# Patient Record
Sex: MALE | Race: BLACK OR AFRICAN AMERICAN | Employment: FULL TIME | URBAN - METROPOLITAN AREA
[De-identification: names, ages, dates, MRNs, and addresses within clinical notes are randomized per-mention and may not be internally consistent; named-entity substitution may affect disease eponyms.]

---

## 2018-11-08 ENCOUNTER — HOSPITAL ENCOUNTER (OUTPATIENT)
Dept: CT IMAGING | Age: 58
Discharge: HOME | End: 2018-11-08
Attending: NURSE PRACTITIONER
Payer: COMMERCIAL

## 2018-11-08 DIAGNOSIS — R31.29 MICROSCOPIC HEMATURIA: ICD-10-CM

## 2018-11-08 LAB — CREAT BLD-MCNC: 1.1 MG/DL (ref 0.8–1.5)

## 2018-11-08 PROCEDURE — 82565 ASSAY OF CREATININE: CPT

## 2018-11-08 PROCEDURE — 74011000258 HC RX REV CODE- 258

## 2018-11-08 PROCEDURE — 74178 CT ABD&PLV WO CNTR FLWD CNTR: CPT

## 2018-11-08 PROCEDURE — 74011636320 HC RX REV CODE- 636/320

## 2018-11-08 RX ORDER — SODIUM CHLORIDE 0.9 % (FLUSH) 0.9 %
10 SYRINGE (ML) INJECTION
Status: COMPLETED | OUTPATIENT
Start: 2018-11-08 | End: 2018-11-08

## 2018-11-08 RX ADMIN — IOPAMIDOL 100 ML: 755 INJECTION, SOLUTION INTRAVENOUS at 08:36

## 2018-11-08 RX ADMIN — SODIUM CHLORIDE 100 ML: 900 INJECTION, SOLUTION INTRAVENOUS at 08:36

## 2018-11-08 RX ADMIN — Medication 10 ML: at 08:36

## 2018-11-12 NOTE — PROGRESS NOTES
Pt has a stone. I recommend he come here to discuss stone treatment options. Please schedule in the next week or so. Can use a held slot if needed.

## 2018-11-18 ENCOUNTER — ANESTHESIA EVENT (OUTPATIENT)
Dept: SURGERY | Age: 58
End: 2018-11-18
Payer: COMMERCIAL

## 2018-11-19 ENCOUNTER — ANESTHESIA (OUTPATIENT)
Dept: SURGERY | Age: 58
End: 2018-11-19
Payer: COMMERCIAL

## 2018-11-19 ENCOUNTER — HOSPITAL ENCOUNTER (OUTPATIENT)
Age: 58
Setting detail: OUTPATIENT SURGERY
Discharge: HOME | End: 2018-11-19
Attending: UROLOGY | Admitting: UROLOGY
Payer: COMMERCIAL

## 2018-11-19 VITALS
HEART RATE: 65 BPM | WEIGHT: 179.6 LBS | DIASTOLIC BLOOD PRESSURE: 95 MMHG | OXYGEN SATURATION: 99 % | BODY MASS INDEX: 26.52 KG/M2 | TEMPERATURE: 97.8 F | RESPIRATION RATE: 16 BRPM | SYSTOLIC BLOOD PRESSURE: 162 MMHG

## 2018-11-19 DIAGNOSIS — N20.0 KIDNEY STONE: Primary | ICD-10-CM

## 2018-11-19 LAB
ANION GAP SERPL CALC-SCNC: 5 MMOL/L (ref 7–16)
BUN SERPL-MCNC: 22 MG/DL (ref 8–23)
CALCIUM SERPL-MCNC: 9.1 MG/DL (ref 8.3–10.4)
CHLORIDE SERPL-SCNC: 107 MMOL/L (ref 98–107)
CO2 SERPL-SCNC: 28 MMOL/L (ref 21–32)
CREAT SERPL-MCNC: 1.2 MG/DL (ref 0.8–1.5)
ERYTHROCYTE [DISTWIDTH] IN BLOOD BY AUTOMATED COUNT: 14.9 %
GLUCOSE SERPL-MCNC: 91 MG/DL (ref 65–100)
HCT VFR BLD AUTO: 45.2 % (ref 41.1–50.3)
HGB BLD-MCNC: 13.9 G/DL (ref 13.6–17.2)
MCH RBC QN AUTO: 24.6 PG (ref 26.1–32.9)
MCHC RBC AUTO-ENTMCNC: 30.8 G/DL (ref 31.4–35)
MCV RBC AUTO: 80 FL (ref 79.6–97.8)
NRBC # BLD: 0 K/UL (ref 0–0.2)
PLATELET # BLD AUTO: 248 K/UL (ref 150–450)
PMV BLD AUTO: 11.9 FL (ref 9.4–12.3)
POTASSIUM SERPL-SCNC: 4.9 MMOL/L (ref 3.5–5.1)
RBC # BLD AUTO: 5.65 M/UL (ref 4.23–5.6)
SODIUM SERPL-SCNC: 140 MMOL/L (ref 136–145)
WBC # BLD AUTO: 5.4 K/UL (ref 4.3–11.1)

## 2018-11-19 PROCEDURE — 74011250636 HC RX REV CODE- 250/636: Performed by: ANESTHESIOLOGY

## 2018-11-19 PROCEDURE — 80048 BASIC METABOLIC PNL TOTAL CA: CPT

## 2018-11-19 PROCEDURE — 76210000063 HC OR PH I REC FIRST 0.5 HR: Performed by: UROLOGY

## 2018-11-19 PROCEDURE — 85027 COMPLETE CBC AUTOMATED: CPT

## 2018-11-19 PROCEDURE — 77030032490 HC SLV COMPR SCD KNE COVD -B: Performed by: UROLOGY

## 2018-11-19 PROCEDURE — 76210000021 HC REC RM PH II 0.5 TO 1 HR: Performed by: UROLOGY

## 2018-11-19 PROCEDURE — 77030010509 HC AIRWY LMA MSK TELE -A: Performed by: ANESTHESIOLOGY

## 2018-11-19 PROCEDURE — 76010000138 HC OR TIME 0.5 TO 1 HR: Performed by: UROLOGY

## 2018-11-19 PROCEDURE — 76060000032 HC ANESTHESIA 0.5 TO 1 HR: Performed by: UROLOGY

## 2018-11-19 PROCEDURE — 50590 FRAGMENTING OF KIDNEY STONE: CPT | Performed by: UROLOGY

## 2018-11-19 PROCEDURE — 74011250636 HC RX REV CODE- 250/636

## 2018-11-19 PROCEDURE — 74011250636 HC RX REV CODE- 250/636: Performed by: UROLOGY

## 2018-11-19 RX ORDER — PROPOFOL 10 MG/ML
INJECTION, EMULSION INTRAVENOUS AS NEEDED
Status: DISCONTINUED | OUTPATIENT
Start: 2018-11-19 | End: 2018-11-19 | Stop reason: HOSPADM

## 2018-11-19 RX ORDER — DEXAMETHASONE SODIUM PHOSPHATE 4 MG/ML
INJECTION, SOLUTION INTRA-ARTICULAR; INTRALESIONAL; INTRAMUSCULAR; INTRAVENOUS; SOFT TISSUE AS NEEDED
Status: DISCONTINUED | OUTPATIENT
Start: 2018-11-19 | End: 2018-11-19 | Stop reason: HOSPADM

## 2018-11-19 RX ORDER — OXYCODONE AND ACETAMINOPHEN 5; 325 MG/1; MG/1
1-2 TABLET ORAL
Qty: 25 TAB | Refills: 0 | Status: SHIPPED | OUTPATIENT
Start: 2018-11-19 | End: 2019-01-07

## 2018-11-19 RX ORDER — LIDOCAINE HYDROCHLORIDE 10 MG/ML
0.1 INJECTION INFILTRATION; PERINEURAL AS NEEDED
Status: DISCONTINUED | OUTPATIENT
Start: 2018-11-19 | End: 2018-11-19 | Stop reason: HOSPADM

## 2018-11-19 RX ORDER — ONDANSETRON 2 MG/ML
INJECTION INTRAMUSCULAR; INTRAVENOUS AS NEEDED
Status: DISCONTINUED | OUTPATIENT
Start: 2018-11-19 | End: 2018-11-19 | Stop reason: HOSPADM

## 2018-11-19 RX ORDER — OXYCODONE HYDROCHLORIDE 5 MG/1
5 TABLET ORAL
Status: DISCONTINUED | OUTPATIENT
Start: 2018-11-19 | End: 2018-11-19 | Stop reason: HOSPADM

## 2018-11-19 RX ORDER — ONDANSETRON 2 MG/ML
4 INJECTION INTRAMUSCULAR; INTRAVENOUS ONCE
Status: DISCONTINUED | OUTPATIENT
Start: 2018-11-19 | End: 2018-11-19 | Stop reason: HOSPADM

## 2018-11-19 RX ORDER — CIPROFLOXACIN 2 MG/ML
400 INJECTION, SOLUTION INTRAVENOUS
Status: COMPLETED | OUTPATIENT
Start: 2018-11-19 | End: 2018-11-19

## 2018-11-19 RX ORDER — DIPHENHYDRAMINE HYDROCHLORIDE 50 MG/ML
12.5 INJECTION, SOLUTION INTRAMUSCULAR; INTRAVENOUS ONCE
Status: DISCONTINUED | OUTPATIENT
Start: 2018-11-19 | End: 2018-11-19 | Stop reason: HOSPADM

## 2018-11-19 RX ORDER — HYDROMORPHONE HYDROCHLORIDE 2 MG/ML
0.5 INJECTION, SOLUTION INTRAMUSCULAR; INTRAVENOUS; SUBCUTANEOUS
Status: DISCONTINUED | OUTPATIENT
Start: 2018-11-19 | End: 2018-11-19 | Stop reason: HOSPADM

## 2018-11-19 RX ORDER — OXYCODONE HYDROCHLORIDE 5 MG/1
10 TABLET ORAL
Status: DISCONTINUED | OUTPATIENT
Start: 2018-11-19 | End: 2018-11-19 | Stop reason: HOSPADM

## 2018-11-19 RX ORDER — FENTANYL CITRATE 50 UG/ML
100 INJECTION, SOLUTION INTRAMUSCULAR; INTRAVENOUS AS NEEDED
Status: DISCONTINUED | OUTPATIENT
Start: 2018-11-19 | End: 2018-11-19 | Stop reason: HOSPADM

## 2018-11-19 RX ORDER — MIDAZOLAM HYDROCHLORIDE 1 MG/ML
2 INJECTION, SOLUTION INTRAMUSCULAR; INTRAVENOUS
Status: DISCONTINUED | OUTPATIENT
Start: 2018-11-19 | End: 2018-11-19 | Stop reason: HOSPADM

## 2018-11-19 RX ORDER — SODIUM CHLORIDE 0.9 % (FLUSH) 0.9 %
5-10 SYRINGE (ML) INJECTION AS NEEDED
Status: DISCONTINUED | OUTPATIENT
Start: 2018-11-19 | End: 2018-11-19 | Stop reason: HOSPADM

## 2018-11-19 RX ORDER — TAMSULOSIN HYDROCHLORIDE 0.4 MG/1
0.4 CAPSULE ORAL DAILY
Qty: 7 CAP | Refills: 0 | Status: SHIPPED | OUTPATIENT
Start: 2018-11-19 | End: 2019-01-07

## 2018-11-19 RX ORDER — LIDOCAINE HYDROCHLORIDE 20 MG/ML
INJECTION, SOLUTION EPIDURAL; INFILTRATION; INTRACAUDAL; PERINEURAL AS NEEDED
Status: DISCONTINUED | OUTPATIENT
Start: 2018-11-19 | End: 2018-11-19 | Stop reason: HOSPADM

## 2018-11-19 RX ORDER — ACETAMINOPHEN 500 MG
500 TABLET ORAL ONCE
Status: DISCONTINUED | OUTPATIENT
Start: 2018-11-19 | End: 2018-11-19 | Stop reason: HOSPADM

## 2018-11-19 RX ORDER — SODIUM CHLORIDE 0.9 % (FLUSH) 0.9 %
5-10 SYRINGE (ML) INJECTION EVERY 8 HOURS
Status: DISCONTINUED | OUTPATIENT
Start: 2018-11-19 | End: 2018-11-19 | Stop reason: HOSPADM

## 2018-11-19 RX ORDER — SODIUM CHLORIDE, SODIUM LACTATE, POTASSIUM CHLORIDE, CALCIUM CHLORIDE 600; 310; 30; 20 MG/100ML; MG/100ML; MG/100ML; MG/100ML
75 INJECTION, SOLUTION INTRAVENOUS CONTINUOUS
Status: DISCONTINUED | OUTPATIENT
Start: 2018-11-19 | End: 2018-11-19 | Stop reason: HOSPADM

## 2018-11-19 RX ORDER — SODIUM CHLORIDE, SODIUM LACTATE, POTASSIUM CHLORIDE, CALCIUM CHLORIDE 600; 310; 30; 20 MG/100ML; MG/100ML; MG/100ML; MG/100ML
1000 INJECTION, SOLUTION INTRAVENOUS CONTINUOUS
Status: DISCONTINUED | OUTPATIENT
Start: 2018-11-19 | End: 2018-11-19 | Stop reason: HOSPADM

## 2018-11-19 RX ORDER — NALOXONE HYDROCHLORIDE 0.4 MG/ML
0.1 INJECTION, SOLUTION INTRAMUSCULAR; INTRAVENOUS; SUBCUTANEOUS AS NEEDED
Status: DISCONTINUED | OUTPATIENT
Start: 2018-11-19 | End: 2018-11-19 | Stop reason: HOSPADM

## 2018-11-19 RX ADMIN — ONDANSETRON 4 MG: 2 INJECTION INTRAMUSCULAR; INTRAVENOUS at 12:39

## 2018-11-19 RX ADMIN — DEXAMETHASONE SODIUM PHOSPHATE 4 MG: 4 INJECTION, SOLUTION INTRA-ARTICULAR; INTRALESIONAL; INTRAMUSCULAR; INTRAVENOUS; SOFT TISSUE at 12:39

## 2018-11-19 RX ADMIN — LIDOCAINE HYDROCHLORIDE 80 MG: 20 INJECTION, SOLUTION EPIDURAL; INFILTRATION; INTRACAUDAL; PERINEURAL at 12:32

## 2018-11-19 RX ADMIN — PROPOFOL 200 MG: 10 INJECTION, EMULSION INTRAVENOUS at 12:32

## 2018-11-19 RX ADMIN — CIPROFLOXACIN 400 MG: 2 INJECTION, SOLUTION INTRAVENOUS at 12:35

## 2018-11-19 RX ADMIN — SODIUM CHLORIDE, SODIUM LACTATE, POTASSIUM CHLORIDE, AND CALCIUM CHLORIDE 1000 ML: 600; 310; 30; 20 INJECTION, SOLUTION INTRAVENOUS at 10:34

## 2018-11-19 NOTE — PERIOP NOTES
Preoperative flank skin condition:PINK Lead shield: YES Patient ear protection:YES 
Gel applied to patient's flank and Lithotripsy head: YES Starting power level:3 Shock start time (first  fluoroscopy):1228 Shock stop time (last lithotripsy shock): 1311 Ending power level:11 Total shocks: 3000 Total fluoroscopy time:1.45 Postoperative flank skin condition: PINK WITH REDDENED RIGHT FLANK

## 2018-11-19 NOTE — H&P
History and Physical 
 
Subjective: Linh Gordon is a 59 y.o. male evaluated with a stone in the patient for right ESWL the first of the week if possible. 14mm right renal pelvic stone. Past Medical History:  
Diagnosis Date  GERD (gastroesophageal reflux disease)   
 pt denies  History of shingles   
 onset 2016- has residual outbreaks R eye  Renal stone Past Surgical History:  
Procedure Laterality Date  ABDOMEN SURGERY PROC UNLISTED    
 champ  HX HEENT    
 tonsils  HX HEENT Right CE  
 HX ORTHOPAEDIC    
 L and R knee scope  NEUROLOGICAL PROCEDURE UNLISTED    
 lumber - for herniated disc Family History Problem Relation Age of Onset  Hypertension Mother  Diabetes Mother   
     po meds only  Stroke Mother  Stroke Father  Lung Disease Father  Hypertension Sister  Hypertension Brother  Hypertension Sister  Hypertension Sister  Cancer Sister Social History Tobacco Use  Smoking status: Never Smoker  Smokeless tobacco: Never Used Substance Use Topics  Alcohol use: No  
 
Allergies Allergen Reactions  Sulfa (Sulfonamide Antibiotics) Other (comments) \"draws muscles\" Prior to Admission medications Medication Sig Start Date End Date Taking? Authorizing Provider  
prednisoLONE acetate (PRED FORTE) 1 % ophthalmic suspension Administer 1 Drop to right eye three (3) times daily. Yes Provider, Historical  
multivitamin (ONE A DAY) tablet Take 1 Tab by mouth daily. Yes Provider, Historical  
traMADol (ULTRAM) 50 mg tablet Take 1 Tab by mouth every six (6) hours as needed for Pain. Max Daily Amount: 200 mg. 11/14/18   Mendel Stalker, NP  
valACYclovir (VALTREX) 500 mg tablet Take 500 mg by mouth daily. 3/18/16   Provider, Historical  
  
 
Review of Systems: A comprehensive review of systems was negative. Objective:  
 
  
Patient Vitals for the past 8 hrs: BP Temp Pulse Resp SpO2 Weight 18 1037 (!) 168/99 98.3 °F (36.8 °C) 71 16 99 % 179 lb 9.6 oz (81.5 kg) Temp (24hrs), Av.3 °F (36.8 °C), Min:98.3 °F (36.8 °C), Max:98.3 °F (36.8 °C) Physical Exam: 
GENERAL: alert, cooperative, no distress, appears stated age, LUNG: clear to auscultation bilaterally, HEART: regular rate and rhythm, S1, S2 normal, no murmur, click, rub or gallop, ABDOMEN: soft, non-tender. Bowel sounds normal. No masses,  no organomegaly Assessment:  
 
nephrolithiasis Schedule patient for right ESWL the first of the week if possible. 14mm right renal pelvic stone. Plan: 1. The risks, benefits, complications, treatment options, and expected outcomes were discussed with the patient. The patient understands the risks, any and all questions were answered to the patient's satisfaction. 2. Proceed with outpatient extracorporeal shock wave lithotripsy (ESWL). Signed By: Darrel Harrison MD  
                      2018

## 2018-11-19 NOTE — ANESTHESIA PREPROCEDURE EVALUATION
Anesthetic History No history of anesthetic complications Review of Systems / Medical History Patient summary reviewed and pertinent labs reviewed Pulmonary Within defined limits Neuro/Psych Within defined limits Cardiovascular Within defined limits Exercise tolerance: >4 METS 
  
GI/Hepatic/Renal 
  
GERD Endo/Other Within defined limits Other Findings Physical Exam 
 
Airway Mallampati: II 
TM Distance: 4 - 6 cm Neck ROM: decreased range of motion Mouth opening: Normal 
 
 Cardiovascular Rhythm: regular Dental 
No notable dental hx Pulmonary Breath sounds clear to auscultation Abdominal 
GI exam deferred Other Findings Anesthetic Plan ASA: 2 Anesthesia type: general 
 
 
 
 
Induction: Intravenous Anesthetic plan and risks discussed with: Patient

## 2018-11-19 NOTE — BRIEF OP NOTE
BRIEF OPERATIVE NOTE Date of Procedure: 11/19/2018 Preoperative Diagnosis: Kidney stone [N20.0] Postoperative Diagnosis: RIGHT KIDNEY STONE Procedure(s): RIGHT LITHOTRIPSY EXTRACORPOREAL SHOCKWAVE ESWL Surgeon(s) and Role: Aislinn Bocanegra MD - Primary Surgical Assistant:  
 
Surgical Staff: 
Circ-1: Elaine Robertson, RN Radiology Technician: Jami Pallas, RT, R, CT Event Time In Time Out Incision Start [phone] Incision Close 1311 Anesthesia: General  
Estimated Blood Loss:  
Specimens: * No specimens in log * Findings:   
Complications:  
Implants: * No implants in log *

## 2018-11-19 NOTE — ANESTHESIA POSTPROCEDURE EVALUATION
Procedure(s): RIGHT LITHOTRIPSY EXTRACORPOREAL SHOCKWAVE ESWL. Anesthesia Post Evaluation Multimodal analgesia: multimodal analgesia not used between 6 hours prior to anesthesia start to PACU discharge Patient location during evaluation: PACU Patient participation: complete - patient participated Level of consciousness: awake and alert Pain management: adequate Airway patency: patent Anesthetic complications: no 
Cardiovascular status: hemodynamically stable Respiratory status: acceptable Hydration status: acceptable Visit Vitals BP (!) 160/97 Pulse 70 Temp 36.5 °C (97.7 °F) Resp 14 Wt 81.5 kg (179 lb 9.6 oz) SpO2 99% BMI 26.52 kg/m²

## 2018-11-19 NOTE — DISCHARGE INSTRUCTIONS
Lithotripsy is a way to treat kidney stones without surgery. It is also called extracorporeal shock wave lithotripsy, or ESWL. This treatment uses sound waves to break kidney stones into tiny pieces. These pieces can then pass out of the body in the urine. Lithotripsy does not make your stone disappear. The treatment is meant to crush your stone so that the fragments can be passed. Strain your urine, save any fragments, and take them to your doctor. You may experience slight bruising at the treated site. ACTIVITY  · As tolerated and as directed by your doctor. · Walking and mild exercise help to pass the stone fragments. DIET  · Clear liquids until no nausea and vomiting; then resume light diet for the first day  · Advance to regular diet on second day, unless your doctor orders otherwise. · If nauseated and/ or vomiting, call your doctor. · Drink at least 8 glasses of water a day (avoid caffeinated beverages). PAIN  · Take pain medication as directed. · Call your doctor if pain is NOT relieved by medication. · DO NOT take aspirin or blood thinners until directed by your doctor. CALL YOUR DOCTOR IF   · Expect blood-tinged urine. Call your doctor if it lasts more than 72 hours or if you cannot see through the urine. · Aches, chills, or fever of 101 degree F or above  · Unable to urinate      AFTER ANESTHESIA   · For the first 24 hours: DO NOT Drive, Drink alcoholic beverages, or Make important decisions. · Be aware of dizziness following anesthesia and while taking pain medication. YOUR DOCTOR'S PHONE NUMBER [phone].     DISCHARGE SUMMARY from Nurse    PATIENT INSTRUCTIONS:    After general anesthesia or intravenous sedation, for 24 hours or while taking prescription Narcotics:  · Limit your activities  · Do not drive and operate hazardous machinery  · Do not make important personal or business decisions  · Do  not drink alcoholic beverages  · If you have not urinated within 8 hours after discharge, please contact your surgeon on call. *  Please give a list of your current medications to your Primary Care Provider. *  Please update this list whenever your medications are discontinued, doses are      changed, or new medications (including over-the-counter products) are added. *  Please carry medication information at all times in case of emergency situations. These are general instructions for a healthy lifestyle:    No smoking/ No tobacco products/ Avoid exposure to second hand smoke    Surgeon General's Warning:  Quitting smoking now greatly reduces serious risk to your health. Obesity, smoking, and sedentary lifestyle greatly increases your risk for illness    A healthy diet, regular physical exercise & weight monitoring are important for maintaining a healthy lifestyle    You may be retaining fluid if you have a history of heart failure or if you experience any of the following symptoms:  Weight gain of 3 pounds or more overnight or 5 pounds in a week, increased swelling in our hands or feet or shortness of breath while lying flat in bed. Please call your doctor as soon as you notice any of these symptoms; do not wait until your next office visit. Recognize signs and symptoms of STROKE:    F-face looks uneven    A-arms unable to move or move unevenly    S-speech slurred or non-existent    T-time-call 911 as soon as signs and symptoms begin-DO NOT go       Back to bed or wait to see if you get better-TIME IS BRAIN.

## 2018-11-19 NOTE — PROGRESS NOTES
's Pre-op visit requested by patient. Conveyed care and concern for patient and family. Offered prayer as requested for patient, family, and staff. John Mckeon MDiv, BS Board Certified [address] [place] [address]

## 2018-11-20 NOTE — OP NOTES
[facility] REPORT    Bart Cope  MR#: [id number]  : 1960  ACCOUNT #: [de-identified]   DATE OF SERVICE: 2018    PREOPERATIVE DIAGNOSIS:  Right kidney stone 14 mm. POSTOPERATIVE DIAGNOSIS:  Right kidney stone 14 mm. PROCEDURE PERFORMED:  Right renal lithotripsy. SURGEON:  ИВАН Bah MD  ASSISTANT:  None. ANESTHESIA:  General.    ESTIMATED BLOOD LOSS:  None. SPECIMENS REMOVED:  None. FINDINGS:  Per dictation. COMPLICATIONS:  None. IMPLANTS:  None. DESCRIPTION OF PROCEDURE:  The patient was taken to the operating room suite and underwent general anesthesia, placed on the lithotripsy table. The 14 mm stone was identified in the renal pelvis of the right kidney. It was blasted with a total of 3000 shocks at a maximum setting with excellent pulverization. The patient will be discharged home on tamsulosin for 7 days and 25 Percocet 5's. The patient will follow up with the nurse practitioner in 4-6 weeks for a KUB.   Force fluids, strain urine and follow up, otherwise, p.r.n.      MD Claudio Wells  D: 2018 13:19     T: 2018 23:40  JOB #: [id number]

## 2019-06-29 ENCOUNTER — HOSPITAL ENCOUNTER (OUTPATIENT)
Dept: MRI IMAGING | Age: 59
Discharge: HOME | End: 2019-06-29
Attending: FAMILY MEDICINE
Payer: COMMERCIAL

## 2019-06-29 DIAGNOSIS — R20.2 PARESTHESIA OF RIGHT ARM: ICD-10-CM

## 2019-06-29 PROCEDURE — 72141 MRI NECK SPINE W/O DYE: CPT

## 2019-08-16 ENCOUNTER — HOSPITAL ENCOUNTER (OUTPATIENT)
Dept: CT IMAGING | Age: 59
Discharge: HOME | End: 2019-08-16
Attending: NEUROLOGICAL SURGERY
Payer: COMMERCIAL

## 2019-08-16 DIAGNOSIS — M50.30 DDD (DEGENERATIVE DISC DISEASE), CERVICAL: ICD-10-CM

## 2019-08-16 DIAGNOSIS — M50.20 CERVICAL DISC HERNIATION: ICD-10-CM

## 2019-08-16 DIAGNOSIS — M48.02 CERVICAL SPINAL STENOSIS: ICD-10-CM

## 2019-08-16 PROCEDURE — 72125 CT NECK SPINE W/O DYE: CPT

## 2019-10-03 ENCOUNTER — ANESTHESIA EVENT (OUTPATIENT)
Dept: SURGERY | Age: 59
End: 2019-10-03
Payer: COMMERCIAL

## 2019-10-03 ENCOUNTER — HOSPITAL ENCOUNTER (OUTPATIENT)
Dept: LAB | Age: 59
Discharge: HOME | End: 2019-10-03
Payer: COMMERCIAL

## 2019-10-03 LAB
ANION GAP SERPL CALC-SCNC: 4 MMOL/L (ref 7–16)
BUN SERPL-MCNC: 21 MG/DL (ref 8–23)
CALCIUM SERPL-MCNC: 9.4 MG/DL (ref 8.3–10.4)
CHLORIDE SERPL-SCNC: 106 MMOL/L (ref 98–107)
CO2 SERPL-SCNC: 31 MMOL/L (ref 21–32)
CREAT SERPL-MCNC: 1.22 MG/DL (ref 0.8–1.5)
ERYTHROCYTE [DISTWIDTH] IN BLOOD BY AUTOMATED COUNT: 15 % (ref 11.9–14.6)
GLUCOSE SERPL-MCNC: 93 MG/DL (ref 65–100)
HCT VFR BLD AUTO: 41 % (ref 41.1–50.3)
HGB BLD-MCNC: 12.7 G/DL (ref 13.6–17.2)
MCH RBC QN AUTO: 24.9 PG (ref 26.1–32.9)
MCHC RBC AUTO-ENTMCNC: 31 G/DL (ref 31.4–35)
MCV RBC AUTO: 80.2 FL (ref 79.6–97.8)
NRBC # BLD: 0 K/UL (ref 0–0.2)
PLATELET # BLD AUTO: 233 K/UL (ref 150–450)
PMV BLD AUTO: 12.2 FL (ref 9.4–12.3)
POTASSIUM SERPL-SCNC: 3.7 MMOL/L (ref 3.5–5.1)
RBC # BLD AUTO: 5.11 M/UL (ref 4.23–5.6)
SODIUM SERPL-SCNC: 141 MMOL/L (ref 136–145)
WBC # BLD AUTO: 5.4 K/UL (ref 4.3–11.1)

## 2019-10-03 PROCEDURE — 36415 COLL VENOUS BLD VENIPUNCTURE: CPT

## 2019-10-03 PROCEDURE — 85027 COMPLETE CBC AUTOMATED: CPT

## 2019-10-03 PROCEDURE — 80048 BASIC METABOLIC PNL TOTAL CA: CPT

## 2019-10-04 ENCOUNTER — HOSPITAL ENCOUNTER (OUTPATIENT)
Age: 59
Setting detail: OUTPATIENT SURGERY
Discharge: HOME | End: 2019-10-04
Attending: UROLOGY | Admitting: UROLOGY
Payer: COMMERCIAL

## 2019-10-04 ENCOUNTER — ANESTHESIA (OUTPATIENT)
Dept: SURGERY | Age: 59
End: 2019-10-04
Payer: COMMERCIAL

## 2019-10-04 VITALS
WEIGHT: 179 LBS | SYSTOLIC BLOOD PRESSURE: 171 MMHG | RESPIRATION RATE: 16 BRPM | DIASTOLIC BLOOD PRESSURE: 99 MMHG | TEMPERATURE: 97.4 F | OXYGEN SATURATION: 97 % | BODY MASS INDEX: 26.82 KG/M2 | HEART RATE: 61 BPM

## 2019-10-04 DIAGNOSIS — N20.0 KIDNEY STONE: Primary | ICD-10-CM

## 2019-10-04 PROCEDURE — 76210000020 HC REC RM PH II FIRST 0.5 HR: Performed by: UROLOGY

## 2019-10-04 PROCEDURE — 74011250637 HC RX REV CODE- 250/637: Performed by: ANESTHESIOLOGY

## 2019-10-04 PROCEDURE — 74011250636 HC RX REV CODE- 250/636

## 2019-10-04 PROCEDURE — 74011000250 HC RX REV CODE- 250

## 2019-10-04 PROCEDURE — 74011250636 HC RX REV CODE- 250/636: Performed by: UROLOGY

## 2019-10-04 PROCEDURE — 77030040361 HC SLV COMPR DVT MDII -B: Performed by: UROLOGY

## 2019-10-04 PROCEDURE — 74011250636 HC RX REV CODE- 250/636: Performed by: ANESTHESIOLOGY

## 2019-10-04 PROCEDURE — 50590 FRAGMENTING OF KIDNEY STONE: CPT | Performed by: UROLOGY

## 2019-10-04 PROCEDURE — 76010000149 HC OR TIME 1 TO 1.5 HR: Performed by: UROLOGY

## 2019-10-04 PROCEDURE — 77030010509 HC AIRWY LMA MSK TELE -A: Performed by: ANESTHESIOLOGY

## 2019-10-04 PROCEDURE — 76060000033 HC ANESTHESIA 1 TO 1.5 HR: Performed by: UROLOGY

## 2019-10-04 PROCEDURE — 76210000006 HC OR PH I REC 0.5 TO 1 HR: Performed by: UROLOGY

## 2019-10-04 RX ORDER — SODIUM CHLORIDE, SODIUM LACTATE, POTASSIUM CHLORIDE, CALCIUM CHLORIDE 600; 310; 30; 20 MG/100ML; MG/100ML; MG/100ML; MG/100ML
1000 INJECTION, SOLUTION INTRAVENOUS CONTINUOUS
Status: DISCONTINUED | OUTPATIENT
Start: 2019-10-04 | End: 2019-10-04 | Stop reason: HOSPADM

## 2019-10-04 RX ORDER — ONDANSETRON 2 MG/ML
INJECTION INTRAMUSCULAR; INTRAVENOUS AS NEEDED
Status: DISCONTINUED | OUTPATIENT
Start: 2019-10-04 | End: 2019-10-04 | Stop reason: HOSPADM

## 2019-10-04 RX ORDER — LIDOCAINE HYDROCHLORIDE 20 MG/ML
INJECTION, SOLUTION EPIDURAL; INFILTRATION; INTRACAUDAL; PERINEURAL AS NEEDED
Status: DISCONTINUED | OUTPATIENT
Start: 2019-10-04 | End: 2019-10-04 | Stop reason: HOSPADM

## 2019-10-04 RX ORDER — ONDANSETRON 2 MG/ML
4 INJECTION INTRAMUSCULAR; INTRAVENOUS ONCE
Status: DISCONTINUED | OUTPATIENT
Start: 2019-10-04 | End: 2019-10-04 | Stop reason: HOSPADM

## 2019-10-04 RX ORDER — NALOXONE HYDROCHLORIDE 0.4 MG/ML
0.1 INJECTION, SOLUTION INTRAMUSCULAR; INTRAVENOUS; SUBCUTANEOUS AS NEEDED
Status: DISCONTINUED | OUTPATIENT
Start: 2019-10-04 | End: 2019-10-04 | Stop reason: HOSPADM

## 2019-10-04 RX ORDER — OXYCODONE HYDROCHLORIDE 5 MG/1
5 TABLET ORAL
Qty: 15 TAB | Refills: 0 | Status: SHIPPED | OUTPATIENT
Start: 2019-10-04 | End: 2019-10-04

## 2019-10-04 RX ORDER — SODIUM CHLORIDE, SODIUM LACTATE, POTASSIUM CHLORIDE, CALCIUM CHLORIDE 600; 310; 30; 20 MG/100ML; MG/100ML; MG/100ML; MG/100ML
75 INJECTION, SOLUTION INTRAVENOUS CONTINUOUS
Status: DISCONTINUED | OUTPATIENT
Start: 2019-10-04 | End: 2019-10-04 | Stop reason: HOSPADM

## 2019-10-04 RX ORDER — DEXAMETHASONE SODIUM PHOSPHATE 4 MG/ML
INJECTION, SOLUTION INTRA-ARTICULAR; INTRALESIONAL; INTRAMUSCULAR; INTRAVENOUS; SOFT TISSUE AS NEEDED
Status: DISCONTINUED | OUTPATIENT
Start: 2019-10-04 | End: 2019-10-04 | Stop reason: HOSPADM

## 2019-10-04 RX ORDER — LIDOCAINE HYDROCHLORIDE 10 MG/ML
0.1 INJECTION INFILTRATION; PERINEURAL AS NEEDED
Status: DISCONTINUED | OUTPATIENT
Start: 2019-10-04 | End: 2019-10-04 | Stop reason: HOSPADM

## 2019-10-04 RX ORDER — ACETAMINOPHEN 500 MG
1000 TABLET ORAL ONCE
Status: COMPLETED | OUTPATIENT
Start: 2019-10-04 | End: 2019-10-04

## 2019-10-04 RX ORDER — HYDROMORPHONE HYDROCHLORIDE 2 MG/ML
0.5 INJECTION, SOLUTION INTRAMUSCULAR; INTRAVENOUS; SUBCUTANEOUS
Status: DISCONTINUED | OUTPATIENT
Start: 2019-10-04 | End: 2019-10-04 | Stop reason: HOSPADM

## 2019-10-04 RX ORDER — DIPHENHYDRAMINE HYDROCHLORIDE 50 MG/ML
12.5 INJECTION, SOLUTION INTRAMUSCULAR; INTRAVENOUS ONCE
Status: DISCONTINUED | OUTPATIENT
Start: 2019-10-04 | End: 2019-10-04 | Stop reason: HOSPADM

## 2019-10-04 RX ORDER — OXYCODONE HYDROCHLORIDE 5 MG/1
10 TABLET ORAL
Status: DISCONTINUED | OUTPATIENT
Start: 2019-10-04 | End: 2019-10-04 | Stop reason: HOSPADM

## 2019-10-04 RX ORDER — CEFAZOLIN SODIUM/WATER 2 G/20 ML
2 SYRINGE (ML) INTRAVENOUS
Status: COMPLETED | OUTPATIENT
Start: 2019-10-04 | End: 2019-10-04

## 2019-10-04 RX ORDER — FENTANYL CITRATE 50 UG/ML
100 INJECTION, SOLUTION INTRAMUSCULAR; INTRAVENOUS AS NEEDED
Status: DISCONTINUED | OUTPATIENT
Start: 2019-10-04 | End: 2019-10-04 | Stop reason: HOSPADM

## 2019-10-04 RX ORDER — ACETAMINOPHEN 500 MG
500 TABLET ORAL ONCE
Status: DISCONTINUED | OUTPATIENT
Start: 2019-10-04 | End: 2019-10-04

## 2019-10-04 RX ORDER — TAMSULOSIN HYDROCHLORIDE 0.4 MG/1
0.4 CAPSULE ORAL DAILY
Qty: 7 CAP | Refills: 0 | Status: SHIPPED | OUTPATIENT
Start: 2019-10-04 | End: 2019-10-23 | Stop reason: CLARIF

## 2019-10-04 RX ORDER — MIDAZOLAM HYDROCHLORIDE 1 MG/ML
2 INJECTION, SOLUTION INTRAMUSCULAR; INTRAVENOUS
Status: DISCONTINUED | OUTPATIENT
Start: 2019-10-04 | End: 2019-10-04 | Stop reason: HOSPADM

## 2019-10-04 RX ORDER — PROPOFOL 10 MG/ML
INJECTION, EMULSION INTRAVENOUS AS NEEDED
Status: DISCONTINUED | OUTPATIENT
Start: 2019-10-04 | End: 2019-10-04 | Stop reason: HOSPADM

## 2019-10-04 RX ORDER — OXYCODONE HYDROCHLORIDE 5 MG/1
5 TABLET ORAL
Status: DISCONTINUED | OUTPATIENT
Start: 2019-10-04 | End: 2019-10-04 | Stop reason: HOSPADM

## 2019-10-04 RX ADMIN — ONDANSETRON 4 MG: 2 INJECTION INTRAMUSCULAR; INTRAVENOUS at 08:41

## 2019-10-04 RX ADMIN — Medication 2 G: at 08:22

## 2019-10-04 RX ADMIN — PROPOFOL 200 MG: 10 INJECTION, EMULSION INTRAVENOUS at 08:25

## 2019-10-04 RX ADMIN — SODIUM CHLORIDE, SODIUM LACTATE, POTASSIUM CHLORIDE, AND CALCIUM CHLORIDE: 600; 310; 30; 20 INJECTION, SOLUTION INTRAVENOUS at 08:16

## 2019-10-04 RX ADMIN — LIDOCAINE HYDROCHLORIDE 60 MG: 20 INJECTION, SOLUTION EPIDURAL; INFILTRATION; INTRACAUDAL; PERINEURAL at 08:25

## 2019-10-04 RX ADMIN — ACETAMINOPHEN 1000 MG: 500 TABLET, FILM COATED ORAL at 09:53

## 2019-10-04 RX ADMIN — DEXAMETHASONE SODIUM PHOSPHATE 4 MG: 4 INJECTION, SOLUTION INTRA-ARTICULAR; INTRALESIONAL; INTRAMUSCULAR; INTRAVENOUS; SOFT TISSUE at 08:38

## 2019-10-04 NOTE — H&P
Kidney Stone            BOY Elias is a 59 y.o. male     Patient returns today for follow-up on stone disease. He is status post right ESWL 2/19. At his last visit there were a couple small stones in the kidney. right proximal ureteral stone. The stone measures about 4 mm and is located at the L4 spine process. For ESWL today.           Past Medical History:   Diagnosis Date    GERD (gastroesophageal reflux disease)       pt denies    History of shingles       onset 2016- has residual outbreaks R eye    Renal stone              Past Surgical History:   Procedure Laterality Date    ABDOMEN SURGERY PROC UNLISTED         champ    HX HEENT         tonsils    HX HEENT Right       CE    HX ORTHOPAEDIC         L and R knee scope    NEUROLOGICAL PROCEDURE UNLISTED         lumber - for herniated disc             Current Outpatient Medications   Medication Sig Dispense Refill    amoxicillin-clavulanate (AUGMENTIN) 875-125 mg per tablet Take 1 Tab by mouth every twelve (12) hours for 7 days. 14 Tab 0    triamcinolone acetonide (KENALOG) 0.1 % topical cream Apply  to affected area three (3) times daily.  30 g 0    prednisoLONE acetate (PRED FORTE) 1 % ophthalmic suspension Administer 1 Drop to right eye three (3) times daily.        multivitamin (ONE A DAY) tablet Take 1 Tab by mouth daily.        valACYclovir (VALTREX) 500 mg tablet Take 500 mg by mouth daily.                Allergies   Allergen Reactions    Sulfa (Sulfonamide Antibiotics) Other (comments)       \"draws muscles\"      Social History            Socioeconomic History    Marital status:        Spouse name: Not on file    Number of children: Not on file    Years of education: Not on file    Highest education level: Not on file   Social Needs    Financial resource strain: Not on file    Food insecurity - worry: Not on file    Food insecurity - inability: Not on file    Transportation needs - medical: Not on file  Transportation needs - non-medical: Not on file   Occupational History    Not on file   Tobacco Use    Smoking status: Never Smoker    Smokeless tobacco: Never Used   Substance and Sexual Activity    Alcohol use: No    Drug use: Not on file    Sexual activity: Not on file   Other Topics Concern    Not on file   Social History Narrative    Not on file            Family History   Problem Relation Age of Onset    Hypertension Mother      Diabetes Mother           po meds only    Stroke Mother      Stroke Father      Lung Disease Father      Hypertension Sister      Hypertension Brother      Hypertension Sister      Hypertension Sister      Cancer Sister           Review of Systems  Constitutional:   Negative for fever. GI:  Negative for nausea, vomiting and abdominal pain. Genitourinary: Positive for history of urolithiasis. Negative for hematuria, erectile dysfunction and testicular pain. Musculoskeletal:  Negative for back pain.     Patient Vitals for the past 4 hrs:   Temp Pulse Resp BP SpO2   10/04/19 0657 98.4 °F (36.9 °C) 68 18 (!) 194/98 99 %       Physical Exam   Constitutional: He is oriented to person, place, and time and well-developed, well-nourished, and in no distress. HENT:   Head: Normocephalic. Cardiovascular: Normal rate. Pulmonary/Chest: Effort normal.   Neurological: He is alert and oriented to person, place, and time. Skin: Skin is warm and dry. Psychiatric: Affect normal.        Assessment and Plan      ICD-10-CM ICD-9-CM     1. Kidney stone N20.0 592.0 AMB POC URINALYSIS DIP STICK AUTO W/ MICRO (PGU)         AMB POC XRAY ABDOMEN 1 VIEW     right proximal ureteral stone. The stone measures about 4 mm and is located at the L4 spine process. KUB: There are couple small stone fragments within the right kidney. There are no stones seen along the course of either ureter.   Patient's urine is clear today as well    For rt ESWL

## 2019-10-04 NOTE — DISCHARGE INSTRUCTIONS
Lithotripsy is a way to treat kidney stones without surgery. It is also called extracorporeal shock wave lithotripsy, or ESWL. This treatment uses sound waves to break kidney stones into tiny pieces. These pieces can then pass out of the body in the urine. Lithotripsy does not make your stone disappear. The treatment is meant to crush your stone so that the fragments can be passed. Strain your urine, save any fragments, and take them to your doctor. You may experience slight bruising at the treated site. ACTIVITY  · As tolerated and as directed by your doctor. · Walking and mild exercise help to pass the stone fragments. DIET  · Clear liquids until no nausea and vomiting; then resume light diet for the first day  · Advance to regular diet on second day, unless your doctor orders otherwise. · If nauseated and/ or vomiting, call your doctor. · Drink at least 8 glasses of water a day (avoid caffeinated beverages). PAIN  · Take pain medication as directed. · Call your doctor if pain is NOT relieved by medication. · DO NOT take aspirin or blood thinners until directed by your doctor. CALL YOUR DOCTOR IF   · Expect blood-tinged urine. Call your doctor if it lasts more than 72 hours or if you cannot see through the urine. · Aches, chills, or fever of 101 degree F or above  · Unable to urinate    AFTER ANESTHESIA   · For the first 24 hours: DO NOT Drive, Drink alcoholic beverages, or Make important decisions. · Be aware of dizziness following anesthesia and while taking pain medication. YOUR DOCTOR'S PHONE NUMBER [phone].     DISCHARGE SUMMARY from Nurse    PATIENT INSTRUCTIONS:    After general anesthesia or intravenous sedation, for 24 hours or while taking prescription Narcotics:  · Limit your activities  · Do not drive and operate hazardous machinery  · Do not make important personal or business decisions  · Do  not drink alcoholic beverages  · If you have not urinated within 8 hours after discharge, please contact your surgeon on call. *  Please give a list of your current medications to your Primary Care Provider. *  Please update this list whenever your medications are discontinued, doses are      changed, or new medications (including over-the-counter products) are added. *  Please carry medication information at all times in case of emergency situations. These are general instructions for a healthy lifestyle:    No smoking/ No tobacco products/ Avoid exposure to second hand smoke    Surgeon General's Warning:  Quitting smoking now greatly reduces serious risk to your health. Obesity, smoking, and sedentary lifestyle greatly increases your risk for illness    A healthy diet, regular physical exercise & weight monitoring are important for maintaining a healthy lifestyle    You may be retaining fluid if you have a history of heart failure or if you experience any of the following symptoms:  Weight gain of 3 pounds or more overnight or 5 pounds in a week, increased swelling in our hands or feet or shortness of breath while lying flat in bed. Please call your doctor as soon as you notice any of these symptoms; do not wait until your next office visit. Recognize signs and symptoms of STROKE:    F-face looks uneven    A-arms unable to move or move unevenly    S-speech slurred or non-existent    T-time-call 911 as soon as signs and symptoms begin-DO NOT go       Back to bed or wait to see if you get better-TIME IS BRAIN.

## 2019-10-04 NOTE — BRIEF OP NOTE
BRIEF OPERATIVE NOTE    Date of Procedure: 10/4/2019   Preoperative Diagnosis: Kidney stone [N20.0]  Postoperative Diagnosis:  rt ureteral stone  Procedure(s):  RIGHT LITHOTRIPSY EXTRACORPOREAL SHOCKWAVE ESWL  Surgeon(s) and Role:     Bruce Robledo MD - Primary         Surgical Assistant:     Surgical Staff:  Circ-1: Sierra Littlejohn RN  Radiology Technician: Cristina Del Cid, RT, R, CT  Event Time In Time Out   Incision Start 0831    Incision Close 0913      Anesthesia: General   Estimated Blood Loss:   Specimens: * No specimens in log *   Findings:    Complications:   Implants: * No implants in log *

## 2019-10-04 NOTE — PERIOP NOTES
Preoperative flank skin condition: warm, dry, intact  Lead shield: Yes  Patient ear protection: Yes   Gel applied to patient's flank and Lithotripsy head: Yes   Starting power level: 3  Shock start time (first  fluoroscopy): 0829  Shock stop time (last lithotripsy shock): 0913  Ending power level: 11  Total shocks: 4000  Total fluoroscopy time: 4mins 45sec  Postoperative flank skin condition: warm, dry, intact

## 2019-10-04 NOTE — OP NOTES
[facility]  OPERATIVE REPORT    Name:  Jessica Padilla  MR#:  [id number]  :  1960  ACCOUNT #:  [de-identified]  DATE OF SERVICE:  10/04/2019    PREOPERATIVE DIAGNOSIS:  History of right renal stones with a right ureteral stone 4 mm. POSTOPERATIVE DIAGNOSIS:  History of right renal stones with a right ureteral stone 4 mm. PROCEDURE PERFORMED:  Right ureteral ESWL. SURGEON:  Xu Garcia MD    ASSISTANT:  None. ANESTHESIA:  General.    COMPLICATIONS:  None. SPECIMENS REMOVED:  None. IMPLANTS:  None. ESTIMATED BLOOD LOSS:  None. FINDINGS:  Per dictation. OPERATIVE INDICATIONS:  The patient had a lithotripsy earlier this year. About 8 months ago, had some stone fragments. It passed but one seemed to have gotten lodged in the ureter. He has not had a lot of discomfort but may have passed a little stone fragment last week or so. It was elected to do a lithotripsy on the 4-mm stone near the L4 transverse process. PROCEDURE:  The patient was taken to the operating room suite. The stone was easily identified and it was blasted after he underwent general anesthesia. The stone was blasted with 4000 shocks at a maximum setting. The patient had apparent good pulverization. He was discharged home on tamsulosin and Percocet and would follow up in the office in about 1 month for a KUB. Strain his urine over the weekend.       Brenda Wyatt MD [id number]  D:  10/04/2019 9:36  T:  10/04/2019 13:44  JOB #:  [id number]

## 2019-10-04 NOTE — ANESTHESIA POSTPROCEDURE EVALUATION
Procedure(s):  RIGHT LITHOTRIPSY EXTRACORPOREAL SHOCKWAVE ESWL. general    Anesthesia Post Evaluation      Multimodal analgesia: multimodal analgesia used between 6 hours prior to anesthesia start to PACU discharge  Patient location during evaluation: bedside  Patient participation: complete - patient participated  Level of consciousness: responsive to verbal stimuli  Pain management: adequate  Airway patency: patent  Anesthetic complications: no  Cardiovascular status: hemodynamically stable  Respiratory status: spontaneous ventilation  Hydration status: stable        Vitals Value Taken Time   /98 10/4/2019  9:54 AM   Temp 36.2 °C (97.2 °F) 10/4/2019  9:24 AM   Pulse 65 10/4/2019  9:56 AM   Resp 14 10/4/2019  9:24 AM   SpO2 97 % 10/4/2019  9:56 AM   Vitals shown include unvalidated device data.

## 2019-10-23 ENCOUNTER — HOSPITAL ENCOUNTER (OUTPATIENT)
Dept: SURGERY | Age: 59
Discharge: HOME | End: 2019-10-23
Payer: COMMERCIAL

## 2019-10-23 VITALS
HEART RATE: 68 BPM | BODY MASS INDEX: 26.68 KG/M2 | OXYGEN SATURATION: 99 % | TEMPERATURE: 98.1 F | WEIGHT: 180.13 LBS | RESPIRATION RATE: 16 BRPM | HEIGHT: 69 IN | SYSTOLIC BLOOD PRESSURE: 160 MMHG | DIASTOLIC BLOOD PRESSURE: 78 MMHG

## 2019-10-23 LAB
ANION GAP SERPL CALC-SCNC: 7 MMOL/L (ref 7–16)
APPEARANCE UR: CLEAR
BACTERIA SPEC CULT: ABNORMAL
BASOPHILS # BLD: 0.1 K/UL (ref 0–0.2)
BASOPHILS NFR BLD: 1 % (ref 0–2)
BILIRUB UR QL: NEGATIVE
BUN SERPL-MCNC: 16 MG/DL (ref 8–23)
CALCIUM SERPL-MCNC: 9 MG/DL (ref 8.3–10.4)
CHLORIDE SERPL-SCNC: 107 MMOL/L (ref 98–107)
CO2 SERPL-SCNC: 27 MMOL/L (ref 21–32)
COLOR UR: YELLOW
CREAT SERPL-MCNC: 1.06 MG/DL (ref 0.8–1.5)
DIFFERENTIAL METHOD BLD: ABNORMAL
EOSINOPHIL # BLD: 0.3 K/UL (ref 0–0.8)
EOSINOPHIL NFR BLD: 5 % (ref 0.5–7.8)
ERYTHROCYTE [DISTWIDTH] IN BLOOD BY AUTOMATED COUNT: 15.2 % (ref 11.9–14.6)
GLUCOSE SERPL-MCNC: 90 MG/DL (ref 65–100)
GLUCOSE UR STRIP.AUTO-MCNC: NEGATIVE MG/DL
HCT VFR BLD AUTO: 40.4 % (ref 41.1–50.3)
HGB BLD-MCNC: 12.4 G/DL (ref 13.6–17.2)
HGB UR QL STRIP: NEGATIVE
IMM GRANULOCYTES # BLD AUTO: 0 K/UL (ref 0–0.5)
IMM GRANULOCYTES NFR BLD AUTO: 0 % (ref 0–5)
KETONES UR QL STRIP.AUTO: NEGATIVE MG/DL
LEUKOCYTE ESTERASE UR QL STRIP.AUTO: NEGATIVE
LYMPHOCYTES # BLD: 2 K/UL (ref 0.5–4.6)
LYMPHOCYTES NFR BLD: 35 % (ref 13–44)
MCH RBC QN AUTO: 24.7 PG (ref 26.1–32.9)
MCHC RBC AUTO-ENTMCNC: 30.7 G/DL (ref 31.4–35)
MCV RBC AUTO: 80.5 FL (ref 79.6–97.8)
MONOCYTES # BLD: 0.6 K/UL (ref 0.1–1.3)
MONOCYTES NFR BLD: 10 % (ref 4–12)
NEUTS SEG # BLD: 2.7 K/UL (ref 1.7–8.2)
NEUTS SEG NFR BLD: 48 % (ref 43–78)
NITRITE UR QL STRIP.AUTO: NEGATIVE
NRBC # BLD: 0 K/UL (ref 0–0.2)
PH UR STRIP: 7 [PH] (ref 5–9)
PLATELET # BLD AUTO: 250 K/UL (ref 150–450)
PMV BLD AUTO: 11.5 FL (ref 9.4–12.3)
POTASSIUM SERPL-SCNC: 3.9 MMOL/L (ref 3.5–5.1)
PROT UR STRIP-MCNC: ABNORMAL MG/DL
RBC # BLD AUTO: 5.02 M/UL (ref 4.23–5.6)
SERVICE CMNT-IMP: ABNORMAL
SODIUM SERPL-SCNC: 141 MMOL/L (ref 136–145)
SP GR UR REFRACTOMETRY: 1.02 (ref 1–1.02)
UROBILINOGEN UR QL STRIP.AUTO: 1 EU/DL (ref 0.2–1)
WBC # BLD AUTO: 5.6 K/UL (ref 4.3–11.1)

## 2019-10-23 PROCEDURE — 81003 URINALYSIS AUTO W/O SCOPE: CPT

## 2019-10-23 PROCEDURE — 85025 COMPLETE CBC W/AUTO DIFF WBC: CPT

## 2019-10-23 PROCEDURE — 80048 BASIC METABOLIC PNL TOTAL CA: CPT

## 2019-10-23 PROCEDURE — 77030027138 HC INCENT SPIROMETER -A

## 2019-10-23 PROCEDURE — 87641 MR-STAPH DNA AMP PROBE: CPT

## 2019-10-23 NOTE — PERIOP NOTES
Patient verified name, , and surgery as listed in [facility]. Patient provided medical/health information and PTA medications to the best of their ability. TYPE  CASE: 3  Orders per surgeon: Order for consent NOT found in EHR at time of PAT visit. Unable to verify case posting against order; surgery verified by patient. Labs per surgeon: CBC with diff, BMP, UA, MRSA. Results: pending  Labs per anesthesia protocol: T/S DOS. EKG:  NA     Nasal Swab collected per MD order and instructions for Mupirocin nasal ointment if required. Patient provided with and instructed on education handouts including Guide to Surgery, blood transfusions, pain management, and hand hygiene for the family and community, and [facility] brochure. Road to Recovery Spine surgery patient guide given. Instructed on incentive spirometry with return demonstration. Patient viewed spine prehab video. Hibiclens and instructions given per hospital policy. Instructed patient to continue previous medications as prescribed prior to surgery unless otherwise directed and to take the following medications the day of surgery according to anesthesia guidelines : Prilosec and Tylenol as instructed if needed . Instructed patient to hold  the following medications on the day of surgery: Multivitamin, Aleve, and all other vitamins, supplements, and NSAIDs. Original medication prescription bottles were not visualized during patient appointment. Medication profile updated  And reviewed with patient. Patient teach back successful and patient demonstrates knowledge of instruction.

## 2019-10-23 NOTE — PERIOP NOTES
Recent Results (from the past 12 hour(s))   URINALYSIS W/ RFLX MICROSCOPIC    Collection Time: 10/23/19 11:19 AM   Result Value Ref Range    Color YELLOW      Appearance CLEAR      Specific gravity 1.018 1.001 - 1.023      pH (UA) 7.0 5.0 - 9.0      Protein TRACE (A) NEG mg/dL    Glucose NEGATIVE  mg/dL    Ketone NEGATIVE  NEG mg/dL    Bilirubin NEGATIVE  NEG      Blood NEGATIVE  NEG      Urobilinogen 1.0 0.2 - 1.0 EU/dL    Nitrites NEGATIVE  NEG      Leukocyte Esterase NEGATIVE  NEG     MSSA/MRSA SC BY PCR, NASAL SWAB    Collection Time: 10/23/19 11:19 AM   Result Value Ref Range    Special Requests: NO SPECIAL REQUESTS      Culture result: (A)       MRSA target DNA not detected, SA target DNA detected. A MRSA negative, SA positive test result does not preclude MRSA nasal colonization. CBC WITH AUTOMATED DIFF    Collection Time: 10/23/19 11:42 AM   Result Value Ref Range    WBC 5.6 4.3 - 11.1 K/uL    RBC 5.02 4.23 - 5.6 M/uL    HGB 12.4 (L) 13.6 - 17.2 g/dL    HCT 40.4 (L) 41.1 - 50.3 %    MCV 80.5 79.6 - 97.8 FL    MCH 24.7 (L) 26.1 - 32.9 PG    MCHC 30.7 (L) 31.4 - 35.0 g/dL    RDW 15.2 (H) 11.9 - 14.6 %    PLATELET 517 897 - 418 K/uL    MPV 11.5 9.4 - 12.3 FL    ABSOLUTE NRBC 0.00 0.0 - 0.2 K/uL    DF AUTOMATED      NEUTROPHILS 48 43 - 78 %    LYMPHOCYTES 35 13 - 44 %    MONOCYTES 10 4.0 - 12.0 %    EOSINOPHILS 5 0.5 - 7.8 %    BASOPHILS 1 0.0 - 2.0 %    IMMATURE GRANULOCYTES 0 0.0 - 5.0 %    ABS. NEUTROPHILS 2.7 1.7 - 8.2 K/UL    ABS. LYMPHOCYTES 2.0 0.5 - 4.6 K/UL    ABS. MONOCYTES 0.6 0.1 - 1.3 K/UL    ABS. EOSINOPHILS 0.3 0.0 - 0.8 K/UL    ABS. BASOPHILS 0.1 0.0 - 0.2 K/UL    ABS. IMM.  GRANS. 0.0 0.0 - 0.5 K/UL   METABOLIC PANEL, BASIC    Collection Time: 10/23/19 11:42 AM   Result Value Ref Range    Sodium 141 136 - 145 mmol/L    Potassium 3.9 3.5 - 5.1 mmol/L    Chloride 107 98 - 107 mmol/L    CO2 27 21 - 32 mmol/L    Anion gap 7 7 - 16 mmol/L    Glucose 90 65 - 100 mg/dL    BUN 16 8 - 23 MG/DL Creatinine 1.06 0.8 - 1.5 MG/DL    GFR est AA >60 >60 ml/min/1.73m2    GFR est non-AA >60 >60 ml/min/1.73m2    Calcium 9.0 8.3 - 10.4 MG/DL

## 2019-10-28 NOTE — H&P (VIEW-ONLY)
[facility] NOTE:  
History of Present Illness: 
 
CC: Follow-up evaluation of right upper extremity numbness, right upper extremity pain and right upper extremity weakness as well as evaluation for preoperative assessment. Jaky Yates is a 62 y.o. male who presents today for follow-up evaluation of right upper extremity numbness, right upper extremity pain and right upper extremity weakness. Patient was last seen on 9/30/2019 at that time his symptoms have been present for 7 months time. States that his right upper extremity weakness has been progressive. He also feels that if his legs are heavy and that is the leaning or tilting to one side or another when he is walking. Over the last week he started to endorse pain between the shoulder blades in his neck that is been new. The patient has MRI cervical spine without contrast performed on 6/29/2018 that demonstrates large central disc herniation that contacts the ventral surface of the cervical cord and C3-C4, there is also multilevel severe cervical spinal stenosis from C3 to C7 secondary to degenerative disc disease superimposed on a congenitally narrow cervical canal.  Patient has a CT cervical spine without performed on 8/16/2019 that demonstrates the previously noted severe cervical spinal stenosis from C3-C7. Patient is scheduled to undergo a C3-C7 posterior cervical laminectomy and fusion on 10/31/2019. He has had no changes in his past medical history. He denies taking any antiplatelet or anticoagulant medications. Past Medical History:  
Diagnosis Date  Bleeds easily ([address] Dentist states pt bled a lot during oral surgery  Cervical stenosis of spine CT 2019- C3-C7  GERD (gastroesophageal reflux disease)   
 resolves with prn omeprazole- well controlled  History of kidney stones 2018 X1 with surgical intervention  History of shingles 2016  
 has residual outbreaks R eye  Kidney stone 09/2019  
 recent lithotripsy 10/4/2019  MSSA (methicillin susceptible Staphylococcus aureus) 10/23/2019  
 postive nasal swab  Neck pain Past Surgical History:  
Procedure Laterality Date  HX CATARACT REMOVAL Right  HX COLONOSCOPY    
 HX KNEE ARTHROSCOPY Bilateral early 2000 L and R knee scope  HX LAP CHOLECYSTECTOMY  2010  HX LITHOTRIPSY  11/2018,10/4/2019  HX TONSILLECTOMY  HX WISDOM TEETH EXTRACTION    
 NEUROLOGICAL PROCEDURE UNLISTED  1996  
 lumber - for herniated disc Allergies Allergen Reactions  Sulfa (Sulfonamide Antibiotics) Other (comments) \" muscle cramps\" Family History Problem Relation Age of Onset  Hypertension Mother  Diabetes Mother   
     po meds only  Stroke Mother  Stroke Father  Lung Disease Father  Hypertension Sister  Hypertension Brother  Hypertension Sister  Hypertension Sister  Cancer Sister Pancreatic Cancer Social History Socioeconomic History  Marital status:  Spouse name: Not on file  Number of children: Not on file  Years of education: Not on file  Highest education level: Not on file Occupational History  Not on file Social Needs  Financial resource strain: Not on file  Food insecurity:  
  Worry: Not on file Inability: Not on file  Transportation needs:  
  Medical: Not on file Non-medical: Not on file Tobacco Use  Smoking status: Never Smoker  Smokeless tobacco: Never Used Substance and Sexual Activity  Alcohol use: No  
 Drug use: Never  Sexual activity: Not on file Lifestyle  Physical activity:  
  Days per week: Not on file Minutes per session: Not on file  Stress: Not on file Relationships  Social connections:  
  Talks on phone: Not on file Gets together: Not on file Attends Zoroastrian service: Not on file Active member of club or organization: Not on file Attends meetings of clubs or organizations: Not on file Relationship status: Not on file  Intimate partner violence:  
  Fear of current or ex partner: Not on file Emotionally abused: Not on file Physically abused: Not on file Forced sexual activity: Not on file Other Topics Concern  Not on file Social History Narrative  Not on file Current Outpatient Medications Medication Sig Dispense Refill  acetaminophen (TYLENOL EXTRA STRENGTH) 500 mg tablet Take 1,000 mg by mouth every eight (8) hours as needed for Pain.  omeprazole (PRILOSEC) 40 mg capsule Take 1 Cap by mouth daily as needed. 2  
 naproxen sodium (ALEVE) 220 mg cap Take  by mouth as needed. Hold for surgery- last dose 9/28/19  multivitamin (ONE A DAY) tablet Take 1 Tab by mouth daily. Review of Systems: A complete ROS was done and as stated in the HPI or otherwise negative. Physical Exam: 
Vitals:  
 10/28/19 7840 BP: 140/82 Pulse: 68 Temp: 97.5 °F (36.4 °C) TempSrc: Tympanic SpO2: 96% Weight: 180 lb (81.6 kg) Height: 5' 9\" (1.753 m) PainSc:   6 PainLoc: Neck Head: Normocephalic and atraumatic Mood and affect appropriate General: No acute distress Skin: warm and dry CV: Regular rate Resp: No increased work of breathing Awake, alert, and oriented to person, place, time, and situation Speech fluent Eyes open spontaneously PERRL, EOMI, visual fields grossly intact to light and confrontation Hearing grossly intact Face symmetric and tongue mid-line on protrusion No mid-line cervical, thoracic, or lumbar tenderness to palpation Patient with strength exam as follows:  
Upper Extremities:      Right    Left 
  
                        Deltoid             5          5 Biceps             4          5 Triceps            4          5                  4-         5 Hand Intrinsics            4-         5 Wrist flexors/extensors           4+        5 Lower Extremities: 
  
                        Hip Flex           5          5 Quads              5          5 Hamstrings      5          5 Dorsiflex          5          5 Plantarflex       5          5 EHL                 5          5 Sensation decreased to light touch in the hand Decreased rapid alternating movements on the right greater than left DTR absent DTRs in the upper extremities with 3+ DTRs in the bilateral lower extremities  
No clonus or babinski present No Pagan's sign present bilaterally Gait nonantalgic gait with mild difficulty standing from a seated position Assessment & Plan: Ofe Sanchez is a 62 y.o. male who presents today for follow-up evaluation of right upper extremity numbness, right upper extremity pain and right upper extremity weakness. I have independently reviewed and interpreted MRI cervical spine without contrast performed on 6/29/2018 that demonstrates large central disc herniation that contacts the ventral surface of the cervical cord and C3-C4, there is also multilevel severe cervical spinal stenosis from C3 to C7 secondary to degenerative disc disease superimposed on a congenitally narrow cervical canal.  Patient has a CT cervical spine without performed on 8/16/2019 that demonstrates the previously noted severe cervical spinal stenosis from C3-C7. The patient has multilevel cervical spinal stenosis that is worse from C3-C7 with severe cervical spinal stenosis and cord compression.   Patient has cervical myelopathy secondary to his multilevel cervical spinal stenosis and would benefit from a C3-C7 posterior cervical laminectomy with lateral mass screw fixation and fusion. Posterior Cervical Laminectomy with Fusion Consent: The relative risks of complication include but are not limited to infection, bleeding, spinal fluid leak, major vascular injury, increased pain, hardware failure, possible need for a second surgery, weakness, numbness, paralysis, incontinence, heart attack, stroke and death were discussed with patient and family members present. They have been provided the opportunity to ask any questions regarding the surgical procedure. The patient has expressed understanding and agrees to proceed with the aforementioned surgery. I have also discussed with the patient and given the length of time that he has been experiencing his right upper extremity weakness that our goal will be to prevent further neurologic decline. It is unlikely that his strength will fully return in the right upper extremity given that he has had weakness in his right upper extremity for greater than 9 months at this time. ICD-10-CM ICD-9-CM 1. Cervical spinal stenosis M48.02 723.0 2. Cervical disc herniation M50.20 722.0 3. DDD (degenerative disc disease), cervical M50.30 722.4 4. Cervical spondylitic cord compression M47.12 721.1 5. Cervical myelopathy (HCC) G95.9 721.1 Kolby Alex. [facility] Notes are transcribed with [employer], a medical voice recording dictation service, and may contain minor errors.

## 2019-10-30 ENCOUNTER — ANESTHESIA EVENT (OUTPATIENT)
Dept: SURGERY | Age: 59
DRG: 472 | End: 2019-10-30
Payer: COMMERCIAL

## 2019-10-31 ENCOUNTER — ANESTHESIA (OUTPATIENT)
Dept: SURGERY | Age: 59
DRG: 472 | End: 2019-10-31
Payer: COMMERCIAL

## 2019-10-31 ENCOUNTER — APPOINTMENT (OUTPATIENT)
Dept: GENERAL RADIOLOGY | Age: 59
DRG: 472 | End: 2019-10-31
Attending: NEUROLOGICAL SURGERY
Payer: COMMERCIAL

## 2019-10-31 ENCOUNTER — HOSPITAL ENCOUNTER (INPATIENT)
Age: 59
LOS: 5 days | Discharge: HOME | DRG: 472 | End: 2019-11-05
Attending: NEUROLOGICAL SURGERY | Admitting: NEUROLOGICAL SURGERY
Payer: COMMERCIAL

## 2019-10-31 DIAGNOSIS — I10 HYPERTENSION, UNSPECIFIED TYPE: ICD-10-CM

## 2019-10-31 DIAGNOSIS — M50.30 DDD (DEGENERATIVE DISC DISEASE), CERVICAL: ICD-10-CM

## 2019-10-31 DIAGNOSIS — M48.02 CERVICAL SPINAL STENOSIS: ICD-10-CM

## 2019-10-31 DIAGNOSIS — G95.9 CERVICAL MYELOPATHY (HCC): Primary | ICD-10-CM

## 2019-10-31 LAB
ABO + RH BLD: NORMAL
BLOOD GROUP ANTIBODIES SERPL: NORMAL
SPECIMEN EXP DATE BLD: NORMAL

## 2019-10-31 PROCEDURE — 77030013794 HC KT TRNSDUC BLD EDWD -B: Performed by: ANESTHESIOLOGY

## 2019-10-31 PROCEDURE — 74011000250 HC RX REV CODE- 250: Performed by: ANESTHESIOLOGY

## 2019-10-31 PROCEDURE — 74011000250 HC RX REV CODE- 250

## 2019-10-31 PROCEDURE — 74011000250 HC RX REV CODE- 250: Performed by: NEUROLOGICAL SURGERY

## 2019-10-31 PROCEDURE — 77030013292 HC BOWL MX PRSM J&J -A: Performed by: ANESTHESIOLOGY

## 2019-10-31 PROCEDURE — L0172 CERV COL SR FOAM 2PC PRE OTS: HCPCS | Performed by: NEUROLOGICAL SURGERY

## 2019-10-31 PROCEDURE — C1713 ANCHOR/SCREW BN/BN,TIS/BN: HCPCS | Performed by: NEUROLOGICAL SURGERY

## 2019-10-31 PROCEDURE — 77030016570 HC BLNKT BAIR HGGR 3M -B: Performed by: ANESTHESIOLOGY

## 2019-10-31 PROCEDURE — 74011000250 HC RX REV CODE- 250: Performed by: NURSE ANESTHETIST, CERTIFIED REGISTERED

## 2019-10-31 PROCEDURE — 74011250636 HC RX REV CODE- 250/636: Performed by: NEUROLOGICAL SURGERY

## 2019-10-31 PROCEDURE — 86900 BLOOD TYPING SEROLOGIC ABO: CPT

## 2019-10-31 PROCEDURE — 77030038552 HC DRN WND MDII -A: Performed by: NEUROLOGICAL SURGERY

## 2019-10-31 PROCEDURE — 77030012894: Performed by: NEUROLOGICAL SURGERY

## 2019-10-31 PROCEDURE — 0RG2071 FUSION OF 2 OR MORE CERVICAL VERTEBRAL JOINTS WITH AUTOLOGOUS TISSUE SUBSTITUTE, POSTERIOR APPROACH, POSTERIOR COLUMN, OPEN APPROACH: ICD-10-PCS | Performed by: NEUROLOGICAL SURGERY

## 2019-10-31 PROCEDURE — 76060000040 HC ANESTHESIA 4.5 TO 5 HR: Performed by: NEUROLOGICAL SURGERY

## 2019-10-31 PROCEDURE — 77030005401 HC CATH RAD ARRO -A: Performed by: ANESTHESIOLOGY

## 2019-10-31 PROCEDURE — 65270000029 HC RM PRIVATE

## 2019-10-31 PROCEDURE — 76210000001 HC OR PH I REC 2.5 TO 3 HR: Performed by: NEUROLOGICAL SURGERY

## 2019-10-31 PROCEDURE — 77030003861 HC BIT DRL STRY -C: Performed by: NEUROLOGICAL SURGERY

## 2019-10-31 PROCEDURE — 77030025420 HC BUR NEUR TPS STRY -C: Performed by: NEUROLOGICAL SURGERY

## 2019-10-31 PROCEDURE — 74011250636 HC RX REV CODE- 250/636: Performed by: ANESTHESIOLOGY

## 2019-10-31 PROCEDURE — 77030011267 HC ELECTRD BLD COVD -A: Performed by: NEUROLOGICAL SURGERY

## 2019-10-31 PROCEDURE — 77030019908 HC STETH ESOPH SIMS -A: Performed by: ANESTHESIOLOGY

## 2019-10-31 PROCEDURE — 0RB30ZZ EXCISION OF CERVICAL VERTEBRAL DISC, OPEN APPROACH: ICD-10-PCS | Performed by: NEUROLOGICAL SURGERY

## 2019-10-31 PROCEDURE — 74011250636 HC RX REV CODE- 250/636: Performed by: NURSE ANESTHETIST, CERTIFIED REGISTERED

## 2019-10-31 PROCEDURE — 77030040106 HC FCPS BIPOLAR DISP INLC -D: Performed by: NEUROLOGICAL SURGERY

## 2019-10-31 PROCEDURE — 0RG20K1 FUSION OF 2 OR MORE CERVICAL VERTEBRAL JOINTS WITH NONAUTOLOGOUS TISSUE SUBSTITUTE, POSTERIOR APPROACH, POSTERIOR COLUMN, OPEN APPROACH: ICD-10-PCS | Performed by: NEUROLOGICAL SURGERY

## 2019-10-31 PROCEDURE — 07DS0ZZ EXTRACTION OF VERTEBRAL BONE MARROW, OPEN APPROACH: ICD-10-PCS | Performed by: NEUROLOGICAL SURGERY

## 2019-10-31 PROCEDURE — 77030008462 HC STPLR SKN PROX J&J -A: Performed by: NEUROLOGICAL SURGERY

## 2019-10-31 PROCEDURE — 77030014647 HC SEAL FBRN TISSL BAXT -D: Performed by: NEUROLOGICAL SURGERY

## 2019-10-31 PROCEDURE — 77030031359 HC BLD BN MILL DISP STRY -E: Performed by: NEUROLOGICAL SURGERY

## 2019-10-31 PROCEDURE — 77030014007 HC SPNG HEMSTAT J&J -B: Performed by: NEUROLOGICAL SURGERY

## 2019-10-31 PROCEDURE — 94760 N-INVAS EAR/PLS OXIMETRY 1: CPT

## 2019-10-31 PROCEDURE — 77030030722 HC PIN SKULL MAYFLD INLC -B: Performed by: NEUROLOGICAL SURGERY

## 2019-10-31 PROCEDURE — 77030003029 HC SUT VCRL J&J -B: Performed by: NEUROLOGICAL SURGERY

## 2019-10-31 PROCEDURE — 76010000176 HC OR TIME 4.5 TO 5 HR INTENSV-TIER 1: Performed by: NEUROLOGICAL SURGERY

## 2019-10-31 PROCEDURE — 4A11X4G MONITORING OF PERIPHERAL NERVOUS ELECTRICAL ACTIVITY, INTRAOPERATIVE, EXTERNAL APPROACH: ICD-10-PCS | Performed by: NEUROLOGICAL SURGERY

## 2019-10-31 PROCEDURE — 77030039425 HC BLD LARYNG TRULITE DISP TELE -A: Performed by: ANESTHESIOLOGY

## 2019-10-31 PROCEDURE — 77030028270 HC SRGFL HEMSTAT MTRX J&J -C: Performed by: NEUROLOGICAL SURGERY

## 2019-10-31 PROCEDURE — 77030040361 HC SLV COMPR DVT MDII -B: Performed by: NEUROLOGICAL SURGERY

## 2019-10-31 PROCEDURE — 77030034849: Performed by: NEUROLOGICAL SURGERY

## 2019-10-31 PROCEDURE — 77010033678 HC OXYGEN DAILY

## 2019-10-31 PROCEDURE — 77030020263 HC SOL INJ SOD CL0.9% LFCR 1000ML

## 2019-10-31 PROCEDURE — 77030018836 HC SOL IRR NACL ICUM -A: Performed by: NEUROLOGICAL SURGERY

## 2019-10-31 PROCEDURE — 74011250637 HC RX REV CODE- 250/637: Performed by: NEUROLOGICAL SURGERY

## 2019-10-31 PROCEDURE — 77030037088 HC TUBE ENDOTRACH ORAL NSL COVD-A: Performed by: ANESTHESIOLOGY

## 2019-10-31 PROCEDURE — 74011250637 HC RX REV CODE- 250/637: Performed by: ANESTHESIOLOGY

## 2019-10-31 DEVICE — POLYAXIAL SCREW
Type: IMPLANTABLE DEVICE | Site: SPINE CERVICAL | Status: FUNCTIONAL
Brand: OASYS

## 2019-10-31 DEVICE — ROD
Type: IMPLANTABLE DEVICE | Site: SPINE CERVICAL | Status: FUNCTIONAL
Brand: OASYS

## 2019-10-31 DEVICE — BLOCKER
Type: IMPLANTABLE DEVICE | Site: SPINE CERVICAL | Status: FUNCTIONAL
Brand: OASYS

## 2019-10-31 DEVICE — BIO DBM PUTTY
Type: IMPLANTABLE DEVICE | Site: SPINE CERVICAL | Status: FUNCTIONAL
Brand: BIO DBM

## 2019-10-31 RX ORDER — METOPROLOL TARTRATE 5 MG/5ML
1 INJECTION INTRAVENOUS
Status: COMPLETED | OUTPATIENT
Start: 2019-10-31 | End: 2019-10-31

## 2019-10-31 RX ORDER — VANCOMYCIN HYDROCHLORIDE 1 G/20ML
INJECTION, POWDER, LYOPHILIZED, FOR SOLUTION INTRAVENOUS AS NEEDED
Status: DISCONTINUED | OUTPATIENT
Start: 2019-10-31 | End: 2019-10-31 | Stop reason: HOSPADM

## 2019-10-31 RX ORDER — HYDROCODONE BITARTRATE AND ACETAMINOPHEN 10; 325 MG/1; MG/1
1 TABLET ORAL
Status: DISCONTINUED | OUTPATIENT
Start: 2019-10-31 | End: 2019-11-05 | Stop reason: HOSPADM

## 2019-10-31 RX ORDER — SODIUM CHLORIDE 0.9 % (FLUSH) 0.9 %
5-40 SYRINGE (ML) INJECTION AS NEEDED
Status: DISCONTINUED | OUTPATIENT
Start: 2019-10-31 | End: 2019-11-01 | Stop reason: SDUPTHER

## 2019-10-31 RX ORDER — ONDANSETRON 2 MG/ML
INJECTION INTRAMUSCULAR; INTRAVENOUS AS NEEDED
Status: DISCONTINUED | OUTPATIENT
Start: 2019-10-31 | End: 2019-10-31 | Stop reason: HOSPADM

## 2019-10-31 RX ORDER — METOPROLOL TARTRATE 25 MG/1
12.5 TABLET, FILM COATED ORAL 2 TIMES DAILY
Status: DISCONTINUED | OUTPATIENT
Start: 2019-10-31 | End: 2019-11-01

## 2019-10-31 RX ORDER — HYDRALAZINE HYDROCHLORIDE 20 MG/ML
5 INJECTION INTRAMUSCULAR; INTRAVENOUS
Status: DISCONTINUED | OUTPATIENT
Start: 2019-10-31 | End: 2019-10-31 | Stop reason: HOSPADM

## 2019-10-31 RX ORDER — HYDROMORPHONE HYDROCHLORIDE 1 MG/ML
1 INJECTION, SOLUTION INTRAMUSCULAR; INTRAVENOUS; SUBCUTANEOUS
Status: DISCONTINUED | OUTPATIENT
Start: 2019-10-31 | End: 2019-11-05 | Stop reason: HOSPADM

## 2019-10-31 RX ORDER — METOPROLOL TARTRATE 5 MG/5ML
INJECTION INTRAVENOUS AS NEEDED
Status: DISCONTINUED | OUTPATIENT
Start: 2019-10-31 | End: 2019-10-31 | Stop reason: HOSPADM

## 2019-10-31 RX ORDER — HYDROMORPHONE HYDROCHLORIDE 2 MG/ML
0.5 INJECTION, SOLUTION INTRAMUSCULAR; INTRAVENOUS; SUBCUTANEOUS
Status: DISCONTINUED | OUTPATIENT
Start: 2019-10-31 | End: 2019-10-31 | Stop reason: HOSPADM

## 2019-10-31 RX ORDER — LIDOCAINE HYDROCHLORIDE ANHYDROUS AND DEXTROSE MONOHYDRATE .4; 5 G/100ML; G/100ML
INJECTION, SOLUTION INTRAVENOUS
Status: DISCONTINUED | OUTPATIENT
Start: 2019-10-31 | End: 2019-10-31 | Stop reason: HOSPADM

## 2019-10-31 RX ORDER — NEOSTIGMINE METHYLSULFATE 1 MG/ML
INJECTION, SOLUTION INTRAVENOUS AS NEEDED
Status: DISCONTINUED | OUTPATIENT
Start: 2019-10-31 | End: 2019-10-31 | Stop reason: HOSPADM

## 2019-10-31 RX ORDER — ACETAMINOPHEN 325 MG/1
650 TABLET ORAL
Status: DISCONTINUED | OUTPATIENT
Start: 2019-10-31 | End: 2019-11-05 | Stop reason: HOSPADM

## 2019-10-31 RX ORDER — SODIUM CHLORIDE 0.9 % (FLUSH) 0.9 %
5-40 SYRINGE (ML) INJECTION EVERY 8 HOURS
Status: DISCONTINUED | OUTPATIENT
Start: 2019-10-31 | End: 2019-11-01 | Stop reason: SDUPTHER

## 2019-10-31 RX ORDER — FENTANYL CITRATE 50 UG/ML
100 INJECTION, SOLUTION INTRAMUSCULAR; INTRAVENOUS ONCE
Status: ACTIVE | OUTPATIENT
Start: 2019-10-31 | End: 2019-10-31

## 2019-10-31 RX ORDER — LIDOCAINE HYDROCHLORIDE 20 MG/ML
INJECTION, SOLUTION EPIDURAL; INFILTRATION; INTRACAUDAL; PERINEURAL AS NEEDED
Status: DISCONTINUED | OUTPATIENT
Start: 2019-10-31 | End: 2019-10-31 | Stop reason: HOSPADM

## 2019-10-31 RX ORDER — CEFAZOLIN SODIUM/WATER 2 G/20 ML
2 SYRINGE (ML) INTRAVENOUS ONCE
Status: COMPLETED | OUTPATIENT
Start: 2019-10-31 | End: 2019-10-31

## 2019-10-31 RX ORDER — SODIUM CHLORIDE 0.9 % (FLUSH) 0.9 %
5-40 SYRINGE (ML) INJECTION EVERY 8 HOURS
Status: DISCONTINUED | OUTPATIENT
Start: 2019-10-31 | End: 2019-11-05 | Stop reason: HOSPADM

## 2019-10-31 RX ORDER — BACITRACIN 50000 [IU]/1
INJECTION, POWDER, FOR SOLUTION INTRAMUSCULAR AS NEEDED
Status: DISCONTINUED | OUTPATIENT
Start: 2019-10-31 | End: 2019-10-31 | Stop reason: HOSPADM

## 2019-10-31 RX ORDER — GABAPENTIN 300 MG/1
600 CAPSULE ORAL ONCE
Status: COMPLETED | OUTPATIENT
Start: 2019-10-31 | End: 2019-10-31

## 2019-10-31 RX ORDER — LIDOCAINE HYDROCHLORIDE 10 MG/ML
0.1 INJECTION INFILTRATION; PERINEURAL AS NEEDED
Status: DISCONTINUED | OUTPATIENT
Start: 2019-10-31 | End: 2019-11-05 | Stop reason: HOSPADM

## 2019-10-31 RX ORDER — CEFAZOLIN SODIUM/WATER 2 G/20 ML
2 SYRINGE (ML) INTRAVENOUS EVERY 8 HOURS
Status: DISCONTINUED | OUTPATIENT
Start: 2019-10-31 | End: 2019-11-03

## 2019-10-31 RX ORDER — ONDANSETRON 2 MG/ML
4 INJECTION INTRAMUSCULAR; INTRAVENOUS
Status: DISCONTINUED | OUTPATIENT
Start: 2019-10-31 | End: 2019-11-05 | Stop reason: HOSPADM

## 2019-10-31 RX ORDER — MIDAZOLAM HYDROCHLORIDE 1 MG/ML
2 INJECTION, SOLUTION INTRAMUSCULAR; INTRAVENOUS
Status: ACTIVE | OUTPATIENT
Start: 2019-10-31 | End: 2019-11-01

## 2019-10-31 RX ORDER — FAMOTIDINE 20 MG/1
20 TABLET, FILM COATED ORAL ONCE
Status: COMPLETED | OUTPATIENT
Start: 2019-10-31 | End: 2019-10-31

## 2019-10-31 RX ORDER — ACETAMINOPHEN 10 MG/ML
1000 INJECTION, SOLUTION INTRAVENOUS
Status: COMPLETED | OUTPATIENT
Start: 2019-10-31 | End: 2019-10-31

## 2019-10-31 RX ORDER — PROPOFOL 10 MG/ML
INJECTION, EMULSION INTRAVENOUS AS NEEDED
Status: DISCONTINUED | OUTPATIENT
Start: 2019-10-31 | End: 2019-10-31 | Stop reason: HOSPADM

## 2019-10-31 RX ORDER — SODIUM CHLORIDE 9 MG/ML
75 INJECTION, SOLUTION INTRAVENOUS CONTINUOUS
Status: DISCONTINUED | OUTPATIENT
Start: 2019-10-31 | End: 2019-11-02

## 2019-10-31 RX ORDER — HYDROCODONE BITARTRATE AND ACETAMINOPHEN 5; 325 MG/1; MG/1
1 TABLET ORAL AS NEEDED
Status: DISCONTINUED | OUTPATIENT
Start: 2019-10-31 | End: 2019-10-31 | Stop reason: HOSPADM

## 2019-10-31 RX ORDER — FENTANYL CITRATE 50 UG/ML
INJECTION, SOLUTION INTRAMUSCULAR; INTRAVENOUS AS NEEDED
Status: DISCONTINUED | OUTPATIENT
Start: 2019-10-31 | End: 2019-10-31 | Stop reason: HOSPADM

## 2019-10-31 RX ORDER — SODIUM CHLORIDE, SODIUM LACTATE, POTASSIUM CHLORIDE, CALCIUM CHLORIDE 600; 310; 30; 20 MG/100ML; MG/100ML; MG/100ML; MG/100ML
150 INJECTION, SOLUTION INTRAVENOUS CONTINUOUS
Status: DISCONTINUED | OUTPATIENT
Start: 2019-10-31 | End: 2019-10-31 | Stop reason: HOSPADM

## 2019-10-31 RX ORDER — PROPOFOL 10 MG/ML
INJECTION, EMULSION INTRAVENOUS
Status: DISCONTINUED | OUTPATIENT
Start: 2019-10-31 | End: 2019-10-31 | Stop reason: HOSPADM

## 2019-10-31 RX ORDER — BUPIVACAINE HYDROCHLORIDE AND EPINEPHRINE 2.5; 5 MG/ML; UG/ML
INJECTION, SOLUTION EPIDURAL; INFILTRATION; INTRACAUDAL; PERINEURAL AS NEEDED
Status: DISCONTINUED | OUTPATIENT
Start: 2019-10-31 | End: 2019-10-31 | Stop reason: HOSPADM

## 2019-10-31 RX ORDER — SODIUM CHLORIDE 9 MG/ML
INJECTION, SOLUTION INTRAVENOUS
Status: DISCONTINUED | OUTPATIENT
Start: 2019-10-31 | End: 2019-10-31 | Stop reason: HOSPADM

## 2019-10-31 RX ORDER — SUCCINYLCHOLINE CHLORIDE 20 MG/ML
INJECTION INTRAMUSCULAR; INTRAVENOUS AS NEEDED
Status: DISCONTINUED | OUTPATIENT
Start: 2019-10-31 | End: 2019-10-31 | Stop reason: HOSPADM

## 2019-10-31 RX ORDER — GLYCOPYRROLATE 0.2 MG/ML
INJECTION INTRAMUSCULAR; INTRAVENOUS AS NEEDED
Status: DISCONTINUED | OUTPATIENT
Start: 2019-10-31 | End: 2019-10-31 | Stop reason: HOSPADM

## 2019-10-31 RX ORDER — SODIUM CHLORIDE 9 MG/ML
50 INJECTION, SOLUTION INTRAVENOUS CONTINUOUS
Status: DISCONTINUED | OUTPATIENT
Start: 2019-10-31 | End: 2019-10-31 | Stop reason: HOSPADM

## 2019-10-31 RX ORDER — ROCURONIUM BROMIDE 10 MG/ML
INJECTION, SOLUTION INTRAVENOUS AS NEEDED
Status: DISCONTINUED | OUTPATIENT
Start: 2019-10-31 | End: 2019-10-31 | Stop reason: HOSPADM

## 2019-10-31 RX ORDER — CYCLOBENZAPRINE HCL 10 MG
10 TABLET ORAL
Status: DISCONTINUED | OUTPATIENT
Start: 2019-10-31 | End: 2019-11-05 | Stop reason: HOSPADM

## 2019-10-31 RX ORDER — ACETAMINOPHEN 500 MG
1000 TABLET ORAL
Status: DISCONTINUED | OUTPATIENT
Start: 2019-10-31 | End: 2019-10-31 | Stop reason: HOSPADM

## 2019-10-31 RX ORDER — HYDROMORPHONE HYDROCHLORIDE 2 MG/ML
INJECTION, SOLUTION INTRAMUSCULAR; INTRAVENOUS; SUBCUTANEOUS AS NEEDED
Status: DISCONTINUED | OUTPATIENT
Start: 2019-10-31 | End: 2019-10-31 | Stop reason: HOSPADM

## 2019-10-31 RX ORDER — DEXAMETHASONE SODIUM PHOSPHATE 4 MG/ML
INJECTION, SOLUTION INTRA-ARTICULAR; INTRALESIONAL; INTRAMUSCULAR; INTRAVENOUS; SOFT TISSUE AS NEEDED
Status: DISCONTINUED | OUTPATIENT
Start: 2019-10-31 | End: 2019-10-31 | Stop reason: HOSPADM

## 2019-10-31 RX ORDER — METOPROLOL TARTRATE 5 MG/5ML
INJECTION INTRAVENOUS
Status: COMPLETED
Start: 2019-10-31 | End: 2019-10-31

## 2019-10-31 RX ORDER — REMIFENTANIL HYDROCHLORIDE 1 MG/ML
INJECTION, POWDER, LYOPHILIZED, FOR SOLUTION INTRAVENOUS
Status: DISCONTINUED | OUTPATIENT
Start: 2019-10-31 | End: 2019-10-31 | Stop reason: HOSPADM

## 2019-10-31 RX ORDER — LABETALOL HYDROCHLORIDE 5 MG/ML
10 INJECTION, SOLUTION INTRAVENOUS
Status: DISCONTINUED | OUTPATIENT
Start: 2019-10-31 | End: 2019-11-05 | Stop reason: HOSPADM

## 2019-10-31 RX ORDER — SODIUM CHLORIDE 0.9 % (FLUSH) 0.9 %
5-40 SYRINGE (ML) INJECTION AS NEEDED
Status: DISCONTINUED | OUTPATIENT
Start: 2019-10-31 | End: 2019-11-05 | Stop reason: HOSPADM

## 2019-10-31 RX ORDER — KETAMINE HYDROCHLORIDE 50 MG/ML
INJECTION, SOLUTION INTRAMUSCULAR; INTRAVENOUS AS NEEDED
Status: DISCONTINUED | OUTPATIENT
Start: 2019-10-31 | End: 2019-10-31 | Stop reason: HOSPADM

## 2019-10-31 RX ADMIN — METOPROLOL TARTRATE 12.5 MG: 25 TABLET ORAL at 18:19

## 2019-10-31 RX ADMIN — Medication 1 AMPULE: at 20:52

## 2019-10-31 RX ADMIN — METOPROLOL TARTRATE 1 MG: 5 INJECTION INTRAVENOUS at 12:50

## 2019-10-31 RX ADMIN — METOPROLOL TARTRATE 1 MG: 5 INJECTION INTRAVENOUS at 12:44

## 2019-10-31 RX ADMIN — KETAMINE HYDROCHLORIDE 40 MG: 50 INJECTION INTRAMUSCULAR; INTRAVENOUS at 08:00

## 2019-10-31 RX ADMIN — DEXAMETHASONE SODIUM PHOSPHATE 10 MG: 4 INJECTION, SOLUTION INTRAMUSCULAR; INTRAVENOUS at 07:54

## 2019-10-31 RX ADMIN — HYDROMORPHONE HYDROCHLORIDE 0.5 MG: 2 INJECTION INTRAMUSCULAR; INTRAVENOUS; SUBCUTANEOUS at 13:29

## 2019-10-31 RX ADMIN — METOPROLOL TARTRATE 1 MG: 5 INJECTION INTRAVENOUS at 12:53

## 2019-10-31 RX ADMIN — ONDANSETRON 4 MG: 2 INJECTION INTRAMUSCULAR; INTRAVENOUS at 07:54

## 2019-10-31 RX ADMIN — Medication 10 ML: at 16:38

## 2019-10-31 RX ADMIN — GABAPENTIN 600 MG: 300 CAPSULE ORAL at 06:05

## 2019-10-31 RX ADMIN — KETAMINE HYDROCHLORIDE 20 MG: 50 INJECTION INTRAMUSCULAR; INTRAVENOUS at 09:00

## 2019-10-31 RX ADMIN — PHENYLEPHRINE HYDROCHLORIDE 60 MCG: 10 INJECTION INTRAVENOUS at 10:57

## 2019-10-31 RX ADMIN — Medication 2 G: at 08:35

## 2019-10-31 RX ADMIN — SODIUM CHLORIDE: 900 INJECTION, SOLUTION INTRAVENOUS at 08:25

## 2019-10-31 RX ADMIN — METOPROLOL TARTRATE 2 MG: 1 INJECTION, SOLUTION INTRAVENOUS at 08:53

## 2019-10-31 RX ADMIN — ROCURONIUM BROMIDE 20 MG: 10 INJECTION, SOLUTION INTRAVENOUS at 08:25

## 2019-10-31 RX ADMIN — PROPOFOL 100 MCG/KG/MIN: 10 INJECTION, EMULSION INTRAVENOUS at 07:51

## 2019-10-31 RX ADMIN — KETAMINE HYDROCHLORIDE 20 MG: 50 INJECTION INTRAMUSCULAR; INTRAVENOUS at 10:00

## 2019-10-31 RX ADMIN — METOPROLOL TARTRATE 1 MG: 5 INJECTION INTRAVENOUS at 12:41

## 2019-10-31 RX ADMIN — FENTANYL CITRATE 50 MCG: 50 INJECTION INTRAMUSCULAR; INTRAVENOUS at 07:56

## 2019-10-31 RX ADMIN — GLYCOPYRROLATE 0.4 MG: 0.2 INJECTION, SOLUTION INTRAMUSCULAR; INTRAVENOUS at 11:40

## 2019-10-31 RX ADMIN — HYDRALAZINE HYDROCHLORIDE 5 MG: 20 INJECTION INTRAMUSCULAR; INTRAVENOUS at 14:05

## 2019-10-31 RX ADMIN — ROCURONIUM BROMIDE 20 MG: 10 INJECTION, SOLUTION INTRAVENOUS at 08:45

## 2019-10-31 RX ADMIN — ACETAMINOPHEN 1000 MG: 10 INJECTION, SOLUTION INTRAVENOUS at 13:45

## 2019-10-31 RX ADMIN — PROPOFOL 70 MG: 10 INJECTION, EMULSION INTRAVENOUS at 08:00

## 2019-10-31 RX ADMIN — PHENYLEPHRINE HYDROCHLORIDE 120 MCG: 10 INJECTION INTRAVENOUS at 11:40

## 2019-10-31 RX ADMIN — Medication 2 G: at 16:47

## 2019-10-31 RX ADMIN — HYDROMORPHONE HYDROCHLORIDE 0.4 MG: 2 INJECTION INTRAMUSCULAR; INTRAVENOUS; SUBCUTANEOUS at 08:10

## 2019-10-31 RX ADMIN — PROPOFOL 50 MG: 10 INJECTION, EMULSION INTRAVENOUS at 08:14

## 2019-10-31 RX ADMIN — PHENYLEPHRINE HYDROCHLORIDE 120 MCG: 10 INJECTION INTRAVENOUS at 09:09

## 2019-10-31 RX ADMIN — HYDROMORPHONE HYDROCHLORIDE 0.6 MG: 2 INJECTION INTRAMUSCULAR; INTRAVENOUS; SUBCUTANEOUS at 07:44

## 2019-10-31 RX ADMIN — PROPOFOL 150 MG: 10 INJECTION, EMULSION INTRAVENOUS at 10:21

## 2019-10-31 RX ADMIN — HYDROCODONE BITARTRATE AND ACETAMINOPHEN 1 TABLET: 10; 325 TABLET ORAL at 18:19

## 2019-10-31 RX ADMIN — PHENYLEPHRINE HYDROCHLORIDE 60 MCG: 10 INJECTION INTRAVENOUS at 10:59

## 2019-10-31 RX ADMIN — HYDROMORPHONE HYDROCHLORIDE 0.2 MG: 2 INJECTION INTRAMUSCULAR; INTRAVENOUS; SUBCUTANEOUS at 10:22

## 2019-10-31 RX ADMIN — HYDROMORPHONE HYDROCHLORIDE 0.5 MG: 2 INJECTION INTRAMUSCULAR; INTRAVENOUS; SUBCUTANEOUS at 14:00

## 2019-10-31 RX ADMIN — FAMOTIDINE 20 MG: 20 TABLET, FILM COATED ORAL at 06:05

## 2019-10-31 RX ADMIN — HYDROMORPHONE HYDROCHLORIDE 0.4 MG: 2 INJECTION INTRAMUSCULAR; INTRAVENOUS; SUBCUTANEOUS at 08:35

## 2019-10-31 RX ADMIN — HYDROMORPHONE HYDROCHLORIDE 1 MG: 1 INJECTION, SOLUTION INTRAMUSCULAR; INTRAVENOUS; SUBCUTANEOUS at 16:31

## 2019-10-31 RX ADMIN — REMIFENTANIL HYDROCHLORIDE 0.05 MCG/KG/MIN: 1 INJECTION, POWDER, LYOPHILIZED, FOR SOLUTION INTRAVENOUS at 08:41

## 2019-10-31 RX ADMIN — LIDOCAINE HYDROCHLORIDE 1 MG/KG/HR: 4 INJECTION, SOLUTION INTRAVENOUS at 08:10

## 2019-10-31 RX ADMIN — Medication 3 AMPULE: at 06:05

## 2019-10-31 RX ADMIN — METOPROLOL TARTRATE 1 MG: 5 INJECTION INTRAVENOUS at 12:47

## 2019-10-31 RX ADMIN — PROPOFOL 30 MG: 10 INJECTION, EMULSION INTRAVENOUS at 08:04

## 2019-10-31 RX ADMIN — SODIUM CHLORIDE, SODIUM LACTATE, POTASSIUM CHLORIDE, AND CALCIUM CHLORIDE 150 ML/HR: 600; 310; 30; 20 INJECTION, SOLUTION INTRAVENOUS at 06:05

## 2019-10-31 RX ADMIN — Medication 10 ML: at 20:54

## 2019-10-31 RX ADMIN — HYDRALAZINE HYDROCHLORIDE 5 MG: 20 INJECTION INTRAMUSCULAR; INTRAVENOUS at 13:05

## 2019-10-31 RX ADMIN — HYDROMORPHONE HYDROCHLORIDE 1 MG: 1 INJECTION, SOLUTION INTRAMUSCULAR; INTRAVENOUS; SUBCUTANEOUS at 20:52

## 2019-10-31 RX ADMIN — PHENYLEPHRINE HYDROCHLORIDE 120 MCG: 10 INJECTION INTRAVENOUS at 11:23

## 2019-10-31 RX ADMIN — LIDOCAINE HYDROCHLORIDE 100 MG: 20 INJECTION, SOLUTION EPIDURAL; INFILTRATION; INTRACAUDAL; PERINEURAL at 07:44

## 2019-10-31 RX ADMIN — CYCLOBENZAPRINE HYDROCHLORIDE 10 MG: 10 TABLET, FILM COATED ORAL at 20:52

## 2019-10-31 RX ADMIN — SUCCINYLCHOLINE CHLORIDE 200 MG: 20 INJECTION, SOLUTION INTRAMUSCULAR; INTRAVENOUS at 07:45

## 2019-10-31 RX ADMIN — PHENYLEPHRINE HYDROCHLORIDE 60 MCG: 10 INJECTION INTRAVENOUS at 11:06

## 2019-10-31 RX ADMIN — SODIUM CHLORIDE: 900 INJECTION, SOLUTION INTRAVENOUS at 12:15

## 2019-10-31 RX ADMIN — ROCURONIUM BROMIDE 5 MG: 10 INJECTION, SOLUTION INTRAVENOUS at 07:44

## 2019-10-31 RX ADMIN — SODIUM CHLORIDE 75 ML/HR: 900 INJECTION, SOLUTION INTRAVENOUS at 16:41

## 2019-10-31 RX ADMIN — PROPOFOL 200 MG: 10 INJECTION, EMULSION INTRAVENOUS at 07:44

## 2019-10-31 RX ADMIN — KETAMINE HYDROCHLORIDE 20 MG: 50 INJECTION INTRAMUSCULAR; INTRAVENOUS at 11:00

## 2019-10-31 RX ADMIN — PHENYLEPHRINE HYDROCHLORIDE 120 MCG: 10 INJECTION INTRAVENOUS at 11:08

## 2019-10-31 RX ADMIN — Medication 3 MG: at 11:40

## 2019-10-31 NOTE — ANESTHESIA PREPROCEDURE EVALUATION
Anesthetic History   No history of anesthetic complications            Review of Systems / Medical History  Patient summary reviewed and pertinent labs reviewed    Pulmonary  Within defined limits                 Neuro/Psych   Within defined limits          Comments: Upper right radicular pain Cardiovascular  Within defined limits                Exercise tolerance: >4 METS     GI/Hepatic/Renal     GERD: well controlled    Renal disease: stones       Endo/Other  Within defined limits           Other Findings              Physical Exam    Airway  Mallampati: II  TM Distance: 4 - 6 cm  Neck ROM: decreased range of motion   Mouth opening: Normal    Comments: Good cervical rom.  Cardiovascular  Regular rate and rhythm,  S1 and S2 normal,  no murmur, click, rub, or gallop  Rhythm: regular  Rate: normal         Dental  No notable dental hx       Pulmonary  Breath sounds clear to auscultation               Abdominal  GI exam deferred       Other Findings            Anesthetic Plan    ASA: 2  Anesthesia type: general          Induction: Intravenous  Anesthetic plan and risks discussed with: Patient and Spouse

## 2019-10-31 NOTE — ANESTHESIA POSTPROCEDURE EVALUATION
Procedure(s):  C3-C7 POSTERIOR CERVICAL LAMINECTOMY WITH LATERAL MASS SCREW FIXATION AND FUSION / SSEP NEUROMONITORING. general    Anesthesia Post Evaluation      Multimodal analgesia: multimodal analgesia used between 6 hours prior to anesthesia start to PACU discharge  Patient location during evaluation: bedside  Patient participation: complete - patient participated  Level of consciousness: awake and alert  Pain management: adequate  Airway patency: patent  Anesthetic complications: no  Cardiovascular status: hemodynamically stable  Respiratory status: spontaneous ventilation  Hydration status: euvolemic  Comments: Patient stable and may discharge at this time. some hypertension in pacu treated with iv metoprolol and iv hydalazine    Vitals Value Taken Time   /86 10/31/2019  2:43 PM   Temp 36.3 °C (97.4 °F) 10/31/2019 12:15 PM   Pulse 84 10/31/2019  2:45 PM   Resp 16 10/31/2019  2:33 PM   SpO2 98 % 10/31/2019  2:45 PM   Vitals shown include unvalidated device data.

## 2019-10-31 NOTE — PERIOP NOTES
TRANSFER - OUT REPORT:    Verbal report given to Delbert Oneal on Kwame Wei  being transferred to [address] [place] [address] for routine post - op       Report consisted of patients Situation, Background, Assessment and   Recommendations(SBAR). Information from the following report(s) SBAR, OR Summary, Procedure Summary, Intake/Output and MAR was reviewed with the receiving nurse. Lines:   Peripheral IV 10/31/19 Left Hand (Active)   Site Assessment Clean, dry, & intact 10/31/2019  2:43 PM   Phlebitis Assessment 0 10/31/2019  2:43 PM   Infiltration Assessment 0 10/31/2019  2:43 PM   Dressing Status Clean, dry, & intact; Occlusive 10/31/2019  2:43 PM   Dressing Type Transparent;Tape 10/31/2019  2:43 PM   Hub Color/Line Status Green; Infusing 10/31/2019  2:43 PM   Alcohol Cap Used No 10/31/2019  2:43 PM       Peripheral IV 10/31/19 Left Foot (Active)   Site Assessment Clean, dry, & intact 10/31/2019  2:43 PM   Phlebitis Assessment 0 10/31/2019  2:43 PM   Infiltration Assessment 0 10/31/2019  2:43 PM   Dressing Status Clean, dry, & intact; Occlusive 10/31/2019  2:43 PM   Dressing Type Transparent;Tape 10/31/2019  2:43 PM   Hub Color/Line Status Pink;Patent 10/31/2019  2:43 PM   Alcohol Cap Used No 10/31/2019  2:43 PM       Peripheral IV 10/31/19 Right Hand (Active)   Site Assessment Clean, dry, & intact 10/31/2019  2:43 PM   Phlebitis Assessment 0 10/31/2019  2:43 PM   Infiltration Assessment 0 10/31/2019  2:43 PM   Dressing Status Clean, dry, & intact; Occlusive 10/31/2019  2:43 PM   Dressing Type Transparent;Tape 10/31/2019  2:43 PM   Hub Color/Line Status Green;Patent 10/31/2019  2:43 PM   Alcohol Cap Used No 10/31/2019  2:43 PM        Opportunity for questions and clarification was provided. Patient transported with:   O2 @ 4 liters    VTE prophylaxis orders have been written for Kwame Wei. Patient and family given floor number and nurses name. Family updated re: pt status after security code verified.

## 2019-10-31 NOTE — PROGRESS NOTES
TRANSFER - IN REPORT:    Verbal report received from [address] [place] [address] RN(name) on Straughn Moll  being received from [facility]) for routine post - op      Report consisted of patients Situation, Background, Assessment and   Recommendations(SBAR). Information from the following report(s) SBAR, Kardex, Procedure Summary, Intake/Output, MAR and Recent Results was reviewed with the receiving nurse. Opportunity for questions and clarification was provided. Assessment completed upon patients arrival to unit and care assumed.

## 2019-10-31 NOTE — INTERVAL H&P NOTE
H&P Update: Yasmani Alfaro was seen and examined. History and physical has been reviewed. The patient has been examined.  There have been no significant clinical changes since the completion of the originally dated History and Physical.

## 2019-10-31 NOTE — PROGRESS NOTES
10/31/19 1529   Dual Skin Pressure Injury Assessment   Dual Skin Pressure Injury Assessment WDL   Second Care Provider (Based on [address] [place] [address]) Dana ARELLANO   Skin Integumentary   Skin Integumentary (WDL) X   Skin Integrity Incision (comment)  (R Flank neck post)

## 2019-10-31 NOTE — ANESTHESIA PROCEDURE NOTES
Arterial Line Placement    Start time: 10/31/2019 7:55 AM  End time: 10/31/2019 7:57 AM  Performed by: Citlali Rolle MD  Authorized by: Feng Colon MD     Pre-Procedure  Indications:  Arterial pressure monitoring and blood sampling  Preanesthetic Checklist: patient identified, risks and benefits discussed, anesthesia consent, site marked, patient being monitored, timeout performed and patient being monitored    Timeout Time: 07:55        Procedure:   Prep:  Chlorhexidine  Seldinger Technique?: Yes    Orientation:  Right  Location:  Radial artery  Catheter size:  20 G  Number of attempts:  2  Cont Cardiac Output Sensor: No      Assessment:   Post-procedure:  Line secured and sterile dressing applied  Patient Tolerance:  Patient tolerated the procedure well with no immediate complications

## 2019-10-31 NOTE — PROGRESS NOTES
's Pre-op visit requested by patient. Conveyed care and concern for patient and family. Offered prayer as requested for patient, family, and staff.     Carrol Manriquez MDiv, BS  Board Certified

## 2019-10-31 NOTE — PERIOP NOTES
Report received from [facility], [facility].

## 2019-10-31 NOTE — OP NOTES
NEUROSURGERY OPERATIVE REPORT     Patient Name:Raleigh Moser     Date of Operation: 10/31/2019     Patient UMZ:[id number]    Patient YOB: 1960     Pre-Operative Diagnosis: Cervical myelopathy with cervical radiculopathy secondary to multi-level cervical degenerative disc disease, cervical spinal stenosis and spondylosis      Post-Operative Diagnosis: SAME AS ABOVE      Procedure:   1. C3-C7 posterior cervical instrumented fixation (EDEL OASIS POSTERIOR SEGMENTAL INSTRUMENTATION):   2. C3 inferior laminectomy, C4, C5, and C6 complete laminectomies, C7 superior laminectomy   3. Continuous intraoperative neuro monitoring  4. Posterior onlay fusion and posterolateral fusion with locally harvested autograft and DBM  5. Continue Intraoperative Neuromonitoring (SSEPs and MEPs)    Surgeon: Jeyson Hanley MD     Surgical Staff:   See Operative Record      Anesthesia : General Endotracheal anesthesia     Estimated Blood Loss:   250 cc     Specimens: None      Procedure in Detail:  The patient was transported to the OR and placed under general anesthesia. Cathlean Can was placed on continuous intraoperative neurophysiological monitoring. He was placed in the Shaffer head pins and turned to the prone position.  The patient MAP were requested to be augmented to greater than 85 throughout the case. He was prepped and draped in a sterile fashion.  A linear midline posterior cervical incision was made. Subperiosteal dissection exposed the spinous process and the lamina from C3-C7.  Intraoperative C-arm fluoroscopic imaging was used to identify the appropriate level.  The spinal process and lamina of C3 was removed with Kerrison rongeurs and the Edel drill.  At C4, C5 and C6 the spinous process and lamina were removed by drilling a trough in the lateral gutter through the lamina and mobilizing the spinous process and lamina in a single piece.  A 1 mm Kerrison rongeur was used to remove the lamina by detaching the soft tissue attachments of the ligamentum flavum and the inter-spinous ligaments. The superior aspect of the spinous process and the superior C7 lamina was removed with high speed drill and kerrison rongeurs. The wound was copiously irrigated. Hemostasis was obtained.     C3-7 EDEL OASIS POSTERIOR SEGMENTAL INSTRUMENTATION:  Next, the facet joints from C3-C7 were decorticated.  A central  hole was drilled in the center of each facet from C3 to C7 to a depth of 10-mm, angled 30 degrees superior and lateral from the center portion of each facet. Next, a 10-mm Oasis polyaxial screw was then placed in each facet within the same trajectory.  A vertical rishi was placed through the screw heads.  Locking nuts were placed and each tightened with a counter torque wrench to 12 PSI.  The wound was copiously irrigated.     C3-7 POSTEROLATERAL ARTHRODESIS WITH MORSELIZED AUTOGRAFT, ALLOGRAFT AND DBX:  Next, the morselized autograft harvested from the laminectomy was placed over the decorticated facets and the exposed dura from C3-C7.  This was augmented with allograft and DBX. Next, the soft tissue retractors were removed. Hemostasis was obtained and a medium hemovac drain was brrought through a separate stab wound from below the deep fascia. Vancomycin was instilled into the wound below the fascia and above the fascia. The deep fascia was closed over the hemovac drain with interrupted 0 Vicryl after hemostasis was obtained. The subcutaneous tissue was reapproximated with 0 and 2-0 interrupted Vicryl. The skin was closed with stales. The wound was sterilely bandaged. The patient was then removed from the Shaffer head pins, turned to supine position, allowed to recover from his general anesthesia. All surgical counts were correct at the end of the case.      Intra-operative neuro monitoring maintained good signals without change throughout the case.      All counts were correct at the end of the case.  The patient tolerated the procedure without any complications     Specimen: None     Jeyson Walter, [address] [facility]  [facility]

## 2019-10-31 NOTE — ANESTHESIA PROCEDURE NOTES
Arterial Line Placement    Performed by: Fabian Phoenix, MD  Authorized by: Fabian Phoenix, MD     Pre-Procedure

## 2019-11-01 ENCOUNTER — APPOINTMENT (OUTPATIENT)
Dept: GENERAL RADIOLOGY | Age: 59
DRG: 472 | End: 2019-11-01
Attending: NEUROLOGICAL SURGERY
Payer: COMMERCIAL

## 2019-11-01 PROBLEM — K21.9 GERD (GASTROESOPHAGEAL REFLUX DISEASE): Status: ACTIVE | Noted: 2019-11-01

## 2019-11-01 PROBLEM — I10 ESSENTIAL HYPERTENSION: Status: ACTIVE | Noted: 2019-11-01

## 2019-11-01 PROCEDURE — 72040 X-RAY EXAM NECK SPINE 2-3 VW: CPT

## 2019-11-01 PROCEDURE — 74011250637 HC RX REV CODE- 250/637: Performed by: INTERNAL MEDICINE

## 2019-11-01 PROCEDURE — 97530 THERAPEUTIC ACTIVITIES: CPT

## 2019-11-01 PROCEDURE — 74011250636 HC RX REV CODE- 250/636: Performed by: NEUROLOGICAL SURGERY

## 2019-11-01 PROCEDURE — 97161 PT EVAL LOW COMPLEX 20 MIN: CPT

## 2019-11-01 PROCEDURE — 65270000029 HC RM PRIVATE

## 2019-11-01 PROCEDURE — 77030020263 HC SOL INJ SOD CL0.9% LFCR 1000ML

## 2019-11-01 PROCEDURE — 74011250637 HC RX REV CODE- 250/637: Performed by: NEUROLOGICAL SURGERY

## 2019-11-01 RX ORDER — AMLODIPINE BESYLATE 5 MG/1
5 TABLET ORAL DAILY
Status: DISCONTINUED | OUTPATIENT
Start: 2019-11-01 | End: 2019-11-02

## 2019-11-01 RX ADMIN — SODIUM CHLORIDE 75 ML/HR: 900 INJECTION, SOLUTION INTRAVENOUS at 06:03

## 2019-11-01 RX ADMIN — HYDROCODONE BITARTRATE AND ACETAMINOPHEN 1 TABLET: 10; 325 TABLET ORAL at 09:20

## 2019-11-01 RX ADMIN — HYDROMORPHONE HYDROCHLORIDE 1 MG: 1 INJECTION, SOLUTION INTRAMUSCULAR; INTRAVENOUS; SUBCUTANEOUS at 19:20

## 2019-11-01 RX ADMIN — Medication 2 G: at 00:47

## 2019-11-01 RX ADMIN — HYDROCODONE BITARTRATE AND ACETAMINOPHEN 1 TABLET: 10; 325 TABLET ORAL at 05:05

## 2019-11-01 RX ADMIN — Medication 10 ML: at 05:05

## 2019-11-01 RX ADMIN — Medication 10 ML: at 22:38

## 2019-11-01 RX ADMIN — SODIUM CHLORIDE 75 ML/HR: 900 INJECTION, SOLUTION INTRAVENOUS at 22:35

## 2019-11-01 RX ADMIN — Medication 1 AMPULE: at 21:36

## 2019-11-01 RX ADMIN — METOPROLOL TARTRATE 12.5 MG: 25 TABLET ORAL at 08:03

## 2019-11-01 RX ADMIN — Medication 2 G: at 09:20

## 2019-11-01 RX ADMIN — Medication 2 G: at 17:59

## 2019-11-01 RX ADMIN — HYDROCODONE BITARTRATE AND ACETAMINOPHEN 1 TABLET: 10; 325 TABLET ORAL at 18:03

## 2019-11-01 RX ADMIN — AMLODIPINE BESYLATE 5 MG: 5 TABLET ORAL at 14:40

## 2019-11-01 RX ADMIN — Medication 1 AMPULE: at 08:03

## 2019-11-01 RX ADMIN — CYCLOBENZAPRINE HYDROCHLORIDE 10 MG: 10 TABLET, FILM COATED ORAL at 05:10

## 2019-11-01 RX ADMIN — HYDROCODONE BITARTRATE AND ACETAMINOPHEN 1 TABLET: 10; 325 TABLET ORAL at 22:35

## 2019-11-01 RX ADMIN — HYDROCODONE BITARTRATE AND ACETAMINOPHEN 1 TABLET: 10; 325 TABLET ORAL at 00:28

## 2019-11-01 RX ADMIN — Medication 10 ML: at 14:41

## 2019-11-01 RX ADMIN — HYDROMORPHONE HYDROCHLORIDE 1 MG: 1 INJECTION, SOLUTION INTRAMUSCULAR; INTRAVENOUS; SUBCUTANEOUS at 14:45

## 2019-11-01 NOTE — PROGRESS NOTES
Problem: Mobility Impaired (Adult and Pediatric)  Goal: *Acute Goals and Plan of Care  Description  Acute PT Goals:  (1.)Raleigh Johnson Iba will move from supine to sit and sit to supine , scoot up and down and roll side to side with MODIFIED INDEPENDENCE within 7 treatment day(s). (2.)Raleigh Hagennie Iba will transfer from bed to chair and chair to bed with MODIFIED INDEPENDENCE using the least restrictive device within 7 treatment day(s). (3.)Raleigh Elizabeth Iba will ambulate with MODIFIED INDEPENDENCE for 500+ feet with the least restrictive device within 7 treatment day(s). (4.)Raleigh Elizabeth Iba will perform standing static and dynamic balance activities x 20 minutes with MODIFIED INDEPENDENCE to improve safety within 7 treatment day(s). (5.)Raleigh Elizabeth Iba will perform bilateral lower extremity exercises x 20 min for HEP with INDEPENDENCE to improve strength, endurance, and functional mobility within 7 treatment day(s). PHYSICAL THERAPY: Initial Assessment, Daily Note and AM 11/1/2019  INPATIENT: PT Visit Days : 1  Payor: Edgar Son / Plan: [place] [employer] [address] / Product Type: PPO /       NAME/AGE/GENDER: Floresita Medrano is a 62 y.o. male   PRIMARY DIAGNOSIS: Cervical spinal stenosis [M48.02]  Cervical myelopathy (HCC) [G95.9] Cervical myelopathy (HCC)   Cervical myelopathy (HCC)    Procedure(s) (LRB):  C3-C7 POSTERIOR CERVICAL LAMINECTOMY WITH LATERAL MASS SCREW FIXATION AND FUSION / SSEP NEUROMONITORING (N/A)  1 Day Post-Op  ICD-10: Treatment Diagnosis:   Difficulty in walking, Not elsewhere classified (R26.2)  Other abnormalities of gait and mobility (R26.89)  Cervicalgia (M54.2)   Precaution/Allergies:  Sulfa (sulfonamide antibiotics)   [place] Collar     ASSESSMENT:     Mr. Sabas Melo is a 62year old M who presents s/p C3-C7 posterior cervical laminectomy with lateral mass screw fixation and fusion.  Prior to hospital admission pt lives with his mom and sister in a one story home with no step(s) to enter, has a ramp. Pt endorses no falls in past 6 months. Prior to admission Mr. Zarina Barth uses no DME for mobility. He had been experiencing RUE numbness, pain, and weakness prior to surgery, but reports upon evaluation that it has much improved since surgery. Upon entering, pt resting in bed, aspen collar donned, agreeable to PT evaluation. he reports 4/10 pain in his neck at rest. BLE assessment indicates sensation to light touch intact, AROM WFL, and strength WFL. Pt performed supine > sit with verbal instruction in log roll technique Min A, sitting EOB with good sitting balance control. Sit > stand with CGA using RW. Treatment initiated to include gait x 20 ft with CGA/RW, cues for posture, sequencing, safety. Pt able to perform sit <> stand transfers with BUE assist at arm rests with CGA. Able to safely maintain static balance control with intermittent BUE assist at [place] [id number]. Pt returned to edge of bed, concerned about getting drowsy due to pain meds, and performed sit > supine with Min A using reverse log roll technique. At end of session pt resting in bed with all needs within reach,RN notified. Pt presents as functioning below his baseline, with deficits in mobility including transfers, gait, balance, and activity tolerance. Pt will benefit from skilled therapy services to address stated deficits to promote return to highest level of function, independence, and safety. Will continue to follow.     This section established at most recent assessment   PROBLEM LIST (Impairments causing functional limitations):  Decreased Strength  Decreased Transfer Abilities  Decreased Ambulation Ability/Technique  Decreased Balance  Increased Pain  Decreased Activity Tolerance   INTERVENTIONS PLANNED: (Benefits and precautions of physical therapy have been discussed with the patient.)  Balance Exercise  Bed Mobility  Family Education  Gait Training  Home Exercise Program (HEP)  Neuromuscular Re-education/Strengthening  Range of Motion (ROM)  Therapeutic Activites  Therapeutic Exercise/Strengthening  Transfer Training     TREATMENT PLAN: Frequency/Duration: twice daily for duration of hospital stay  Rehabilitation Potential For Stated Goals: Good     REHAB RECOMMENDATIONS (at time of discharge pending progress):    Placement: It is my opinion, based on this patient's performance to date, that Mr. Beena Simpson may benefit from [address] after discharge due to the functional deficits listed above that are likely to improve with skilled rehabilitation because he/she has multiple medical issues that affect his/her functional mobility in the community vs. HOME WITH NO NEEDS pending progress with therapy and medical status. Equipment:   None at this time              HISTORY:   History of Present Injury/Illness (Reason for Referral):  Per H&P: Caesar Mohs Verlan Dais is a 62 y.o. male who presents today for follow-up evaluation of right upper extremity numbness, right upper extremity pain and right upper extremity weakness. Patient was last seen on 9/30/2019 at that time his symptoms have been present for 7 months time. States that his right upper extremity weakness has been progressive. He also feels that if his legs are heavy and that is the leaning or tilting to one side or another when he is walking. Over the last week he started to endorse pain between the shoulder blades in his neck that is been new. The patient has MRI cervical spine without contrast performed on 6/29/2018 that demonstrates large central disc herniation that contacts the ventral surface of the cervical cord and C3-C4, there is also multilevel severe cervical spinal stenosis from C3 to C7 secondary to degenerative disc disease superimposed on a congenitally narrow cervical canal.  Patient has a CT cervical spine without performed on 8/16/2019 that demonstrates the previously noted severe cervical spinal stenosis from C3-C7. Patient is scheduled to undergo a C3-C7 posterior cervical laminectomy and fusion on 10/31/2019. He has had no changes in his past medical history. He denies taking any antiplatelet or anticoagulant medications. \"  Past Medical History/Comorbidities:   Mr. Livia Minor  has a past medical history of Bleeds easily [facility]), Cervical stenosis of spine, GERD (gastroesophageal reflux disease), History of kidney stones (2018), History of shingles (2016), Kidney stone (09/2019), MSSA (methicillin susceptible Staphylococcus aureus) (10/23/2019), and Neck pain. Mr. Livia Minor  has a past surgical history that includes hx knee arthroscopy (Bilateral, early 2000); hx tonsillectomy; hx cataract removal (Right); hx lap cholecystectomy (2010); pr neurological procedure unlisted (1996); hx lithotripsy (11/2018,10/4/2019); hx colonoscopy; and hx wisdom teeth extraction. Social History/Living Environment:   Home Environment: Private residence  # Steps to Enter: 0  Wheelchair Ramp: Yes  One/Two Story Residence: One story  Living Alone: No  Support Systems: Family member(s)  Patient Expects to be Discharged to[de-identified] Private residence  Current DME Used/Available at Home: None  Prior Level of Function/Work/Activity:  Independent, lives with family, no falls but does endorse some unsteadiness. Number of Personal Factors/Comorbidities that affect the Plan of Care: 1-2: MODERATE COMPLEXITY   EXAMINATION:   Most Recent Physical Functioning:   Gross Assessment:  AROM: Within functional limits  Strength: Within functional limits  Sensation: Intact               Posture:     Balance:  Sitting: Intact  Standing: Impaired  Standing - Static: Good  Standing - Dynamic : Fair Bed Mobility:  Rolling: Contact guard assistance  Supine to Sit: Minimum assistance  Sit to Supine: Minimum assistance  Scooting: Contact guard assistance  Interventions: Safety awareness training; Tactile cues; Verbal cues  Wheelchair Mobility:     Transfers:  Sit to Stand: Contact guard assistance  Stand to Sit: Contact guard assistance  Bed to Chair: Contact guard assistance  Interventions: Safety awareness training; Tactile cues; Verbal cues  Gait:     Base of Support: Center of gravity altered  Speed/Maria Victoria: Shuffled; Slow  Step Length: Right shortened;Left shortened  Distance (ft): 20 Feet (ft)  Assistive Device: Walker, rolling;Gait belt  Ambulation - Level of Assistance: Contact guard assistance      Body Structures Involved:  Nerves  Bones  Joints  Muscles Body Functions Affected:  Neuromusculoskeletal  Movement Related Activities and Participation Affected:  General Tasks and Demands  Mobility   Number of elements that affect the Plan of Care: 4+: HIGH COMPLEXITY   CLINICAL PRESENTATION:   Presentation: Stable and uncomplicated: LOW COMPLEXITY   CLINICAL DECISION MAKIN[address] [place] [address] AM-PAC 6 Clicks   Basic Mobility Inpatient Short Form  How much difficulty does the patient currently have. .. Unable A Lot A Little None   1. Turning over in bed (including adjusting bedclothes, sheets and blankets)? ? 1   ? 2   ? 3   ? 4   2. Sitting down on and standing up from a chair with arms ( e.g., wheelchair, bedside commode, etc.)   ? 1   ? 2   ? 3   ? 4   3. Moving from lying on back to sitting on the side of the bed?   ? 1   ? 2   ? 3   ? 4   How much help from another person does the patient currently need. .. Total A Lot A Little None   4. Moving to and from a bed to a chair (including a wheelchair)? ? 1   ? 2   ? 3   ? 4   5. Need to walk in hospital room? ? 1   ? 2   ? 3   ? 4   6. Climbing 3-5 steps with a railing? ? 1   ? 2   ? 3   ? 4   © , Trustees of [address] [place] [address], under license to [employer]. All rights reserved      Score:  Initial: 18 Most Recent: X (Date: -- )    Interpretation of Tool:  Represents activities that are increasingly more difficult (i.e. Bed mobility, Transfers, Gait).     Medical Necessity:     Patient is expected to demonstrate progress in strength, range of motion, balance, coordination and functional technique   to improve safety during all mobility and promote return to prior level of function. .  Reason for Services/Other Comments:  Patient continues to require skilled intervention due to medical complications and mobility deficits which impact his level of function, safety, and independence as indicated above. .   Use of outcome tool(s) and clinical judgement create a POC that gives a: Clear prediction of patient's progress: LOW COMPLEXITY        TREATMENT:   (In addition to Assessment/Re-Assessment sessions the following treatments were rendered)   Pre-treatment Symptoms/Complaints:  Stiff & sore  Pain: Initial:   Pain Intensity 1: 4  Pain Location 1: Neck  Post Session:  3/10     Therapeutic Activity: (   15 Minutes): Therapeutic activities including Bed transfers, Chair transfers, Ambulation on level ground and safety education  to improve mobility, strength, balance and coordination. Required minimal   to promote static and dynamic balance in standing and promote coordination of bilateral, upper extremity(s), lower extremity(s). Braces/Orthotics/Lines/Etc:   IV  drain cervical   O2 Device: Room air  Treatment/Session Assessment:    Response to Treatment:  see above  Interdisciplinary Collaboration:   Physical Therapist  Registered Nurse  After treatment position/precautions:   Supine in bed  Bed/Chair-wheels locked  Bed in low position  Call light within reach  RN notified   Compliance with Program/Exercises: Will assess as treatment progresses  Recommendations/Intent for next treatment session: \"Next visit will focus on advancements to more challenging activities and reduction in assistance provided\".     Total Treatment Duration:  PT Patient Time In/Time Out  Time In: 0930  Time Out: [address], [place]

## 2019-11-01 NOTE — MED STUDENT NOTES
*ATTENTION:  This note has been created by a medical student for educational purposes only. Please do not refer to the content of this note for clinical decision-making, billing, or other purposes. Please see attending physicians note to obtain clinical information on this patient. * History and Physical 
 
Patient: Jaky Yates MRN: [id number]  SSN: [id number] YOB: 1960  Age: 62 y.o. Sex: male Subjective: Jaky Yates is a 62 y.o. male who is being seen today for consultation regarding significantly elevated blood pressures following a C3-C7 posterior cervical laminectomy and fusion. Hydralazine was initially given for hypertensive urgency at 1&2pm 10/31/19. Lopressor 12.5mg was ordered q12 and blood pressures have decreased but still hypertensive. He states he was previously told at his last doctor's visit on 10/3/19 his blood pressure was elevated but due to his kidney stone his PCP said they would revisit the issue. He has never been aware previously of his blood pressure being elevated before then. He has an extensive family history of hypertension with his mother, two sisters and brother all being diagnosed. He states his diet is routine with two meals typically consisting of a sandwich or burger and fries. He admits to not consuming much vegetables. 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 He denies smoking, use of alcohol and any drug usage other than prescribed. Medical and surgical history confirmed with the patient. Current medications outside of the hospital consist of acetaminophen 500mg daily, omeprazole 40mg PRN, and daily multivitamin. Past Medical History:  
Diagnosis Date  Bleeds easily ([address] Dentist states pt bled a lot during oral surgery  Cervical stenosis of spine CT 2019- C3-C7  GERD (gastroesophageal reflux disease) resolves with prn omeprazole- well controlled  History of kidney stones 2018 X1 with surgical intervention  History of shingles 2016  
 has residual outbreaks R eye  Kidney stone 09/2019  
 recent lithotripsy 10/4/2019  MSSA (methicillin susceptible Staphylococcus aureus) 10/23/2019  
 postive nasal swab  Neck pain Past Surgical History:  
Procedure Laterality Date  HX CATARACT REMOVAL Right  HX COLONOSCOPY    
 HX KNEE ARTHROSCOPY Bilateral early 2000 L and R knee scope  HX LAP CHOLECYSTECTOMY  2010  HX LITHOTRIPSY  11/2018,10/4/2019  HX TONSILLECTOMY  HX WISDOM TEETH EXTRACTION    
 NEUROLOGICAL PROCEDURE UNLISTED  1996  
 lumber - for herniated disc Family History Problem Relation Age of Onset  Hypertension Mother  Diabetes Mother   
     po meds only  Stroke Mother  Stroke Father  Lung Disease Father  Hypertension Sister  Hypertension Brother  Hypertension Sister  Hypertension Sister  Cancer Sister Pancreatic Cancer Social History Tobacco Use  Smoking status: Never Smoker  Smokeless tobacco: Never Used Substance Use Topics  Alcohol use: No  
  
Prior to Admission medications Medication Sig Start Date End Date Taking? Authorizing Provider  
acetaminophen (TYLENOL EXTRA STRENGTH) 500 mg tablet Take 1,000 mg by mouth every eight (8) hours as needed for Pain. Yes Provider, Historical  
omeprazole (PRILOSEC) 40 mg capsule Take 1 Cap by mouth daily as needed. 9/18/19  Yes Provider, Historical  
naproxen sodium (ALEVE) 220 mg cap Take  by mouth as needed. Hold for surgery- last dose 9/28/19   Yes Provider, Historical  
multivitamin (ONE A DAY) tablet Take 1 Tab by mouth daily. Yes Provider, Historical  
  
 
Allergies Allergen Reactions  Sulfa (Sulfonamide Antibiotics) Other (comments) \" muscle cramps\" Review of Systems: He denies any chest pain, palpitations, N/V/D or shortness of breath Objective:  
 
Vitals:  
 10/31/19 2122 11/01/19 0024 11/01/19 0395 11/01/19 3602 BP: 175/77 146/89 169/81 155/83 Pulse: 76 66 72 70 Resp: 17 17 17 18 Temp: 98.7 °F (37.1 °C) 98.5 °F (36.9 °C) 98.8 °F (37.1 °C) 98.4 °F (36.9 °C) SpO2: 100% 99% 99% 100% Weight:      
Height:      
  
 
Physical Exam: 
GENERAL: WDWN AAOx3  male supine with head elevation, in no acute distress in good affect HEENT: Normocephalic with cervical collar present, PERRL EOMI HEART: RRR, no murmurs, rubs or gallops, no bruits present LUNGS: CTA bilaterally on anterior chest, no wheezes, rales or rhonchi, even respirations, nonlabored breathing ABDOMEN: normal bowel sounds present, soft, nontender VASCULAR: well perfused hands and feet b/l, radial and pedal pulses +2, capillary refill <2 seconds NEURO: CN II-XII grossly intact MSK: not tested for stability due to recent neurosurgery Assessment:  
Stage 2 Hypertension based on multiple readings in the hospital post cervical laminectomy. Lopressor 12.5mg Q12 PO has been ordered during inpatient stay. Will discuss lifestyle medications including diet control and activity. Amlodipine 5mg PO BID to be added in hospital for assessment in decreasing pressures to normal range <901 systolic <97 diastolic. If uncontrolled then addition of lisinopril or losartan can be added. Labs to be ordered include CMP, lipid panel, TSH, ECG, hemoglobin a1c, UA and BNP for baseline assessment. Patient to remain in the hospital until blood pressure is managed. Following discharge patient to follow up with primary care provider for blood pressure reading and medication management. Hospital Problems  Date Reviewed: 10/31/2019 Codes Class Noted POA Cervical myelopathy (HCC) ICD-10-CM: G95.9 ICD-9-CM: 721.1  10/31/2019 Unknown Plan:  
 
 
Signed By: Akash Soria November 1, 2019

## 2019-11-01 NOTE — PROGRESS NOTES
Physical Therapy Note:    Attempted to see patient this afternoon for second physical therapy treatment session. Patient currently going off of the floor for x-ray. Will follow and re-attempt as schedule permits/patient available.     Thank you,  Светлана Carver, PT, DPT

## 2019-11-01 NOTE — PROGRESS NOTES
Chart screened by  for discharge planning. PT ordered this am.  Evaluation pending. No needs identified at this time. Please consult  if any discharge needs arise. Care Management Interventions  PCP Verified by CM:  Yes  Last Visit to PCP: 09/18/19  Mode of Transport at Discharge: Self  Discharge Durable Medical Equipment: No  Physical Therapy Consult: Yes  Occupational Therapy Consult: No  Speech Therapy Consult: No  Confirm Follow Up Transport: Self  Discharge Location  Discharge Placement: Home

## 2019-11-01 NOTE — CONSULTS
Consult    Patient: Nan Meza MRN: [id number]  SSN: [id number]    YOB: 1960  Age: 62 y.o. Sex: male      Subjective: Nan Meza is a 62 y.o. male who is being seen for hypertension management. Patient was admitted due to cervical stenosis causing cervical myeopathy and right arm weakness. He underwent cervical spine surgery on 10-. He states that he never had hypertension until recently when he was told that BP was high. He has been taking Naproxen PRN for neck pain. No other illicit drugs use. No smoking. No habitual alcohol drinks. He works as a . No fever. No shaking. No chills. No shortness of breath. Denies CAD, CHF, stroke, DM, thyroid problems. BP was better after Metoprolol was started this admission.      Past Medical History:   Diagnosis Date    Bleeds easily [facility])     Dentist states pt bled a lot during oral surgery    Cervical stenosis of spine     CT 2019- C3-C7    GERD (gastroesophageal reflux disease)     resolves with prn omeprazole- well controlled    History of kidney stones 2018    X1 with surgical intervention    History of shingles 2016    has residual outbreaks R eye    Kidney stone 09/2019    recent lithotripsy 10/4/2019    MSSA (methicillin susceptible Staphylococcus aureus) 10/23/2019    postive nasal swab    Neck pain      Past Surgical History:   Procedure Laterality Date    HX CATARACT REMOVAL Right     HX COLONOSCOPY      HX KNEE ARTHROSCOPY Bilateral early 2000    L and R knee scope    HX LAP CHOLECYSTECTOMY  2010    HX LITHOTRIPSY  11/2018,10/4/2019    HX TONSILLECTOMY      HX WISDOM TEETH EXTRACTION      NEUROLOGICAL PROCEDURE UNLISTED  1996    lumber - for herniated disc      Family History   Problem Relation Age of Onset    Hypertension Mother     Diabetes Mother         po meds only   Orin Romainey Stroke Mother     Stroke Father     Lung Disease Father     Hypertension Sister    Orin Araceli Hypertension Brother     Hypertension Sister     Hypertension Sister     Cancer Sister         Pancreatic Cancer     Social History     Tobacco Use    Smoking status: Never Smoker    Smokeless tobacco: Never Used   Substance Use Topics    Alcohol use: No      Current Facility-Administered Medications   Medication Dose Route Frequency Provider Last Rate Last Dose    lidocaine (XYLOCAINE) 10 mg/mL (1 %) injection 0.1 mL  0.1 mL SubCUTAneous PRN Bryan Eubanks MD        sodium chloride (NS) flush 5-40 mL  5-40 mL IntraVENous Q8H Bryan Eubanks MD   10 mL at 11/01/19 0505    sodium chloride (NS) flush 5-40 mL  5-40 mL IntraVENous PRN Bryan Eubanks MD        0.9% sodium chloride infusion  75 mL/hr IntraVENous CONTINUOUS Bryan Eubanks MD 75 mL/hr at 11/01/19 0603 75 mL/hr at 11/01/19 0603    alcohol 62% (NOZIN) nasal  1 Ampule  1 Ampule Topical Q12H Bryan Eubanks MD   1 Ampule at 11/01/19 0803    sodium chloride (NS) flush 5-40 mL  5-40 mL IntraVENous Q8H Bryan Eubanks MD   10 mL at 11/01/19 0505    sodium chloride (NS) flush 5-40 mL  5-40 mL IntraVENous PRN Bryan Eubanks MD        acetaminophen (TYLENOL) tablet 650 mg  650 mg Oral Q4H PRN Bryan Eubanks MD        HYDROcodone-acetaminophen [facility])  mg tablet 1 Tab  1 Tab Oral Q4H PRN Bryan uEbanks MD   1 Tab at 11/01/19 0920    HYDROmorphone (PF) (DILAUDID) injection 1 mg  1 mg IntraVENous Q4H PRN Bryan Eubanks MD   1 mg at 10/31/19 2052    ondansetron (ZOFRAN) injection 4 mg  4 mg IntraVENous Q4H PRN Bryan Eubanks MD        phenol throat spray (CHLORASEPTIC) 1 Spray  1 Spray Oral PRN Bryan Eubanks MD        ceFAZolin (ANCEF) 2 g/20 mL in sterile water IV syringe  2 g IntraVENous Q8H Bryan Eubanks MD   2 g at 11/01/19 0920    cyclobenzaprine (FLEXERIL) tablet 10 mg  10 mg Oral TID PRN Bryan Eubanks MD   10 mg at 11/01/19 0510    labetalol (NORMODYNE;TRANDATE) injection 10 mg  10 mg IntraVENous Q4H PRN Bryan Eubanks MD        metoprolol tartrate (LOPRESSOR) tablet 12.5 mg  12.5 mg Oral BID Delmy Blue MD   12.5 mg at 11/01/19 0803        Allergies   Allergen Reactions    Sulfa (Sulfonamide Antibiotics) Other (comments)     \" muscle cramps\"       Review of Systems:    Constitutional: Negative for chills and fever. HENT: Negative for rhinorrhea and sore throat. Eyes: Negative for pain, redness and visual disturbance. Respiratory: Negative for chest tightness, shortness of breath and wheezing. Cardiovascular: Negative for chest pain and leg swelling. Gastrointestinal: Negative for abdominal pain, bowel incontinence, diarrhea, nausea and vomiting. Genitourinary: Negative for bladder incontinence, dysuria and hematuria. Musculoskeletal: Negative for back pain, gait problem, + neck pain and neck stiffness. Skin: Negative for color change and rash. Neurological: negative for speech difficulty. Negative for focal weakness, weakness, numbness, headaches and loss of balance. Psychiatric/Behavioral: Negative for agitation, confusion and memory loss. Objective:     Vitals:    10/31/19 2122 11/01/19 0024 11/01/19 0452 11/01/19 0721   BP: 175/77 146/89 169/81 155/83   Pulse: 76 66 72 70   Resp: 17 17 17 18   Temp: 98.7 °F (37.1 °C) 98.5 °F (36.9 °C) 98.8 °F (37.1 °C) 98.4 °F (36.9 °C)   SpO2: 100% 99% 99% 100%   Weight:       Height:            Physical Exam:    General:                    The patient is a pleasant male in no acute distress. He is lying in bed with a collar support. Head:                                   Normocephalic/atraumatic. Eyes:                                   No palpebral pallor or scleral icterus. ENT:                                    External auricular and nasal exam within normal limits. Mucous membranes are moist.  Neck:                                   Supple, non-tender, no JVD.   Lungs:                       Clear to auscultation bilaterally without wheezes or crackles. No respiratory distress or accessory muscle use. Heart:                                  Regular rate and rhythm, without murmurs, rubs, or gallops. Abdomen:                  Soft, non-tender, non-distended with normoactive bowel sounds. Genitourinary:           No tenderness over the bladder or bilateral CVAs. Extremities:               Without clubbing, cyanosis, or edema. Skin:                                    Normal color, texture, and turgor. No rashes, lesions, or jaundice. Pulses:                      Radial and dorsalis pedis pulses present 2+ bilaterally. Capillary refill <2s. Neurologic:                CN II-XII grossly intact and symmetrical.                                              right hand  is less than the left one. Psychiatric:                Pleasant demeanor, appropriate affect. Alert and oriented x 3    Lab and data     No results found for this or any previous visit (from the past 24 hour(s)). Assessment:     Hospital Problems  Date Reviewed: 10/31/2019          Codes Class Noted POA    Essential hypertension ICD-10-CM: I10  ICD-9-CM: 401.9  11/1/2019 Unknown        GERD (gastroesophageal reflux disease) ICD-10-CM: K21.9  ICD-9-CM: 530.81  11/1/2019 Unknown        * (Principal) Cervical myelopathy ([facility] [address] ICD-10-CM: G95.9  ICD-9-CM: 721.1  10/31/2019 Unknown              Plan:     Hypertension   Likely essential hypertension. Will start calcium channel blocker, Amlodipine. Will check BMP, TSH, CXR tomorrow. Will stop Metoprolol for now. Will monitor. Monitor blood pressure and manage accordingly. Avoid NSAIDs.      GERD   On PPI    Cervical myelopathy   S/P surgery   As per neurosurgical team.     I have discussed the plan of care with patient and care team.    DVT prophylaxis : as per neurosurgical team.     Signed By: Olvin Han MD     November 1, 2019

## 2019-11-02 ENCOUNTER — APPOINTMENT (OUTPATIENT)
Dept: GENERAL RADIOLOGY | Age: 59
DRG: 472 | End: 2019-11-02
Attending: INTERNAL MEDICINE
Payer: COMMERCIAL

## 2019-11-02 LAB
ANION GAP SERPL CALC-SCNC: 6 MMOL/L (ref 7–16)
BASOPHILS # BLD: 0 K/UL (ref 0–0.2)
BASOPHILS NFR BLD: 0 % (ref 0–2)
BUN SERPL-MCNC: 17 MG/DL (ref 8–23)
CALCIUM SERPL-MCNC: 7.8 MG/DL (ref 8.3–10.4)
CHLORIDE SERPL-SCNC: 104 MMOL/L (ref 98–107)
CO2 SERPL-SCNC: 28 MMOL/L (ref 21–32)
CREAT SERPL-MCNC: 0.99 MG/DL (ref 0.8–1.5)
DIFFERENTIAL METHOD BLD: ABNORMAL
EOSINOPHIL # BLD: 0 K/UL (ref 0–0.8)
EOSINOPHIL NFR BLD: 0 % (ref 0.5–7.8)
ERYTHROCYTE [DISTWIDTH] IN BLOOD BY AUTOMATED COUNT: 15.1 % (ref 11.9–14.6)
GLUCOSE SERPL-MCNC: 99 MG/DL (ref 65–100)
HCT VFR BLD AUTO: 39.3 % (ref 41.1–50.3)
HGB BLD-MCNC: 12.3 G/DL (ref 13.6–17.2)
IMM GRANULOCYTES # BLD AUTO: 0 K/UL (ref 0–0.5)
IMM GRANULOCYTES NFR BLD AUTO: 0 % (ref 0–5)
LYMPHOCYTES # BLD: 1.7 K/UL (ref 0.5–4.6)
LYMPHOCYTES NFR BLD: 13 % (ref 13–44)
MCH RBC QN AUTO: 25.3 PG (ref 26.1–32.9)
MCHC RBC AUTO-ENTMCNC: 31.3 G/DL (ref 31.4–35)
MCV RBC AUTO: 80.7 FL (ref 79.6–97.8)
MONOCYTES # BLD: 1.8 K/UL (ref 0.1–1.3)
MONOCYTES NFR BLD: 14 % (ref 4–12)
NEUTS SEG # BLD: 9.3 K/UL (ref 1.7–8.2)
NEUTS SEG NFR BLD: 72 % (ref 43–78)
NRBC # BLD: 0 K/UL (ref 0–0.2)
PLATELET # BLD AUTO: 235 K/UL (ref 150–450)
PMV BLD AUTO: 11.8 FL (ref 9.4–12.3)
POTASSIUM SERPL-SCNC: 3.7 MMOL/L (ref 3.5–5.1)
RBC # BLD AUTO: 4.87 M/UL (ref 4.23–5.6)
SODIUM SERPL-SCNC: 138 MMOL/L (ref 136–145)
TSH SERPL DL<=0.005 MIU/L-ACNC: 0.42 UIU/ML (ref 0.36–3.74)
WBC # BLD AUTO: 12.8 K/UL (ref 4.3–11.1)

## 2019-11-02 PROCEDURE — 36415 COLL VENOUS BLD VENIPUNCTURE: CPT

## 2019-11-02 PROCEDURE — 74011250637 HC RX REV CODE- 250/637: Performed by: INTERNAL MEDICINE

## 2019-11-02 PROCEDURE — 65270000029 HC RM PRIVATE

## 2019-11-02 PROCEDURE — 74011250636 HC RX REV CODE- 250/636: Performed by: NEUROLOGICAL SURGERY

## 2019-11-02 PROCEDURE — 80048 BASIC METABOLIC PNL TOTAL CA: CPT

## 2019-11-02 PROCEDURE — 97530 THERAPEUTIC ACTIVITIES: CPT

## 2019-11-02 PROCEDURE — 84443 ASSAY THYROID STIM HORMONE: CPT

## 2019-11-02 PROCEDURE — 71045 X-RAY EXAM CHEST 1 VIEW: CPT

## 2019-11-02 PROCEDURE — 97110 THERAPEUTIC EXERCISES: CPT

## 2019-11-02 PROCEDURE — 77030020263 HC SOL INJ SOD CL0.9% LFCR 1000ML

## 2019-11-02 PROCEDURE — 74011250637 HC RX REV CODE- 250/637: Performed by: NEUROLOGICAL SURGERY

## 2019-11-02 PROCEDURE — 85025 COMPLETE CBC W/AUTO DIFF WBC: CPT

## 2019-11-02 RX ORDER — AMLODIPINE BESYLATE 10 MG/1
10 TABLET ORAL DAILY
Status: DISCONTINUED | OUTPATIENT
Start: 2019-11-03 | End: 2019-11-05 | Stop reason: HOSPADM

## 2019-11-02 RX ADMIN — Medication 1 AMPULE: at 23:41

## 2019-11-02 RX ADMIN — Medication 2 G: at 18:29

## 2019-11-02 RX ADMIN — Medication 2 G: at 01:12

## 2019-11-02 RX ADMIN — Medication 1 AMPULE: at 09:02

## 2019-11-02 RX ADMIN — AMLODIPINE BESYLATE 5 MG: 5 TABLET ORAL at 09:02

## 2019-11-02 RX ADMIN — HYDROCODONE BITARTRATE AND ACETAMINOPHEN 1 TABLET: 10; 325 TABLET ORAL at 12:50

## 2019-11-02 RX ADMIN — Medication 10 ML: at 04:48

## 2019-11-02 RX ADMIN — HYDROCODONE BITARTRATE AND ACETAMINOPHEN 1 TABLET: 10; 325 TABLET ORAL at 04:47

## 2019-11-02 RX ADMIN — Medication 10 ML: at 16:42

## 2019-11-02 RX ADMIN — HYDROCODONE BITARTRATE AND ACETAMINOPHEN 1 TABLET: 10; 325 TABLET ORAL at 18:29

## 2019-11-02 RX ADMIN — Medication 5 ML: at 23:42

## 2019-11-02 RX ADMIN — HYDROCODONE BITARTRATE AND ACETAMINOPHEN 1 TABLET: 10; 325 TABLET ORAL at 09:02

## 2019-11-02 RX ADMIN — Medication 2 G: at 09:02

## 2019-11-02 RX ADMIN — HYDROMORPHONE HYDROCHLORIDE 1 MG: 1 INJECTION, SOLUTION INTRAMUSCULAR; INTRAVENOUS; SUBCUTANEOUS at 01:12

## 2019-11-02 RX ADMIN — HYDROCODONE BITARTRATE AND ACETAMINOPHEN 1 TABLET: 10; 325 TABLET ORAL at 23:42

## 2019-11-02 NOTE — PROGRESS NOTES
Hospitalist Consult Note     Admit Date:  10/31/2019  5:20 AM   Name:  Narciso Wilkinson   Age:  62 y.o.  :  1960   MRN:  [id number]   PCP:  Veronica Sims MD  Treatment Team: Attending Provider: Nohemy Barnett MD; Consulting Provider: Parris Salinas MD; Charge Nurse: Diana Deng; Physical Therapist: Viola Gaming, PT    HPI:   62year old [place] [id number] male admitted by neurosurgery S/P cervical spine surgery on 10/31. Hospital group consulted  for HTN management. Blood pressure SBP ranges 140s-170s with current 172/94. He was started on metoprolol and Norvasc, I increased Norvasc dose today. He remains alert and oriented x3. Does state some neck pain that is relieved by pain medication. No CP, no SOB. He tells me he did not take medication for HTN before this hospital stay but will follow up with his PCP Dr. Kati Joel, once discharged. He denies dizziness or headache at present. 10 systems reviewed and negative except as noted in HPI.   Past Medical History:   Diagnosis Date    Bleeds easily [facility])     Dentist states pt bled a lot during oral surgery    Cervical stenosis of spine     CT - C3-C7    GERD (gastroesophageal reflux disease)     resolves with prn omeprazole- well controlled    History of kidney stones 2018    X1 with surgical intervention    History of shingles 2016    has residual outbreaks R eye    Kidney stone 2019    recent lithotripsy 10/4/2019    MSSA (methicillin susceptible Staphylococcus aureus) 10/23/2019    postive nasal swab    Neck pain       Past Surgical History:   Procedure Laterality Date    HX CATARACT REMOVAL Right     HX COLONOSCOPY      HX KNEE ARTHROSCOPY Bilateral early     L and R knee scope    HX LAP CHOLECYSTECTOMY      HX LITHOTRIPSY  2018,10/4/2019    HX TONSILLECTOMY      HX WISDOM TEETH EXTRACTION      NEUROLOGICAL PROCEDURE UNLISTED      lumber - for herniated disc      Current Facility-Administered Medications   Medication Dose Route Frequency    [START ON 11/3/2019] amLODIPine (NORVASC) tablet 10 mg  10 mg Oral DAILY    lidocaine (XYLOCAINE) 10 mg/mL (1 %) injection 0.1 mL  0.1 mL SubCUTAneous PRN    0.9% sodium chloride infusion  75 mL/hr IntraVENous CONTINUOUS    alcohol 62% (NOZIN) nasal  1 Ampule  1 Ampule Topical Q12H    sodium chloride (NS) flush 5-40 mL  5-40 mL IntraVENous Q8H    sodium chloride (NS) flush 5-40 mL  5-40 mL IntraVENous PRN    acetaminophen (TYLENOL) tablet 650 mg  650 mg Oral Q4H PRN    HYDROcodone-acetaminophen (NORCO)  mg tablet 1 Tab  1 Tab Oral Q4H PRN    HYDROmorphone (PF) (DILAUDID) injection 1 mg  1 mg IntraVENous Q4H PRN    ondansetron (ZOFRAN) injection 4 mg  4 mg IntraVENous Q4H PRN    phenol throat spray (CHLORASEPTIC) 1 Spray  1 Spray Oral PRN    ceFAZolin (ANCEF) 2 g/20 mL in sterile water IV syringe  2 g IntraVENous Q8H    cyclobenzaprine (FLEXERIL) tablet 10 mg  10 mg Oral TID PRN    labetalol (NORMODYNE;TRANDATE) injection 10 mg  10 mg IntraVENous Q4H PRN     Allergies   Allergen Reactions    Sulfa (Sulfonamide Antibiotics) Other (comments)     \" muscle cramps\"      Social History     Tobacco Use    Smoking status: Never Smoker    Smokeless tobacco: Never Used   Substance Use Topics    Alcohol use: No      Family History   Problem Relation Age of Onset    Hypertension Mother     Diabetes Mother         po meds only    Stroke Mother     Stroke Father     Lung Disease Father     Hypertension Sister     Hypertension Brother     Hypertension Sister     Hypertension Sister     Cancer Sister         Pancreatic Cancer        There is no immunization history on file for this patient.     Objective:     Patient Vitals for the past 24 hrs:   Temp Pulse Resp BP SpO2   11/02/19 0740 98.3 °F (36.8 °C) 92 18 (!) 172/94 94 %   11/02/19 0309 98.1 °F (36.7 °C) 100 18 148/89 95 %   11/01/19 2330 98.8 °F (37.1 °C) 87 18 154/74 94 %   11/01/19 2221 98.5 °F (36.9 °C) 87 18 165/82 94 %   11/01/19 1600 98.7 °F (37.1 °C) 81 18 157/85 94 %   11/01/19 1131 98.4 °F (36.9 °C) 68 18 126/74 99 %     Oxygen Therapy  O2 Sat (%): 94 % (11/02/19 0740)  Pulse via Oximetry: 83 beats per minute (10/31/19 1457)  O2 Device: Room air (11/01/19 0733)  O2 Flow Rate (L/min): 4 l/min (10/31/19 1443)    Intake/Output Summary (Last 24 hours) at 11/2/2019 0951  Last data filed at 11/2/2019 0515  Gross per 24 hour   Intake    Output 750 ml   Net -750 ml       Physical Exam:  General:    Well nourished. Alert. Collar support on  Eyes:   Normal sclera. Extraocular movements intact. ENT:  Normocephalic, atraumatic. Moist mucous membranes  CV:   RRR. No murmur, rub, or gallop. Lungs:  CTAB. No wheezing, rhonchi, or rales. Abdomen: Soft, nontender, nondistended. Bowel sounds normal.   Extremities: Warm and dry. No cyanosis or edema. Neurologic: CN II-XII grossly intact. Sensation intact. Skin:     No rashes or jaundice. Psych:  Normal mood and affect. I reviewed the labs, imaging, EKGs, telemetry, and other studies done this admission.   Data Review:   Recent Results (from the past 24 hour(s))   METABOLIC PANEL, BASIC    Collection Time: 11/02/19  5:01 AM   Result Value Ref Range    Sodium 138 136 - 145 mmol/L    Potassium 3.7 3.5 - 5.1 mmol/L    Chloride 104 98 - 107 mmol/L    CO2 28 21 - 32 mmol/L    Anion gap 6 (L) 7 - 16 mmol/L    Glucose 99 65 - 100 mg/dL    BUN 17 8 - 23 MG/DL    Creatinine 0.99 0.8 - 1.5 MG/DL    GFR est AA >60 >60 ml/min/1.73m2    GFR est non-AA >60 >60 ml/min/1.73m2    Calcium 7.8 (L) 8.3 - 10.4 MG/DL   CBC WITH AUTOMATED DIFF    Collection Time: 11/02/19  5:01 AM   Result Value Ref Range    WBC 12.8 (H) 4.3 - 11.1 K/uL    RBC 4.87 4.23 - 5.6 M/uL    HGB 12.3 (L) 13.6 - 17.2 g/dL    HCT 39.3 (L) 41.1 - 50.3 %    MCV 80.7 79.6 - 97.8 FL    MCH 25.3 (L) 26.1 - 32.9 PG    MCHC 31.3 (L) 31.4 - 35.0 g/dL    RDW 15.1 (H) 11.9 - 14.6 %    PLATELET 725 033 - 371 K/uL    MPV 11.8 9.4 - 12.3 FL    ABSOLUTE NRBC 0.00 0.0 - 0.2 K/uL    DF AUTOMATED      NEUTROPHILS 72 43 - 78 %    LYMPHOCYTES 13 13 - 44 %    MONOCYTES 14 (H) 4.0 - 12.0 %    EOSINOPHILS 0 (L) 0.5 - 7.8 %    BASOPHILS 0 0.0 - 2.0 %    IMMATURE GRANULOCYTES 0 0.0 - 5.0 %    ABS. NEUTROPHILS 9.3 (H) 1.7 - 8.2 K/UL    ABS. LYMPHOCYTES 1.7 0.5 - 4.6 K/UL    ABS. MONOCYTES 1.8 (H) 0.1 - 1.3 K/UL    ABS. EOSINOPHILS 0.0 0.0 - 0.8 K/UL    ABS. BASOPHILS 0.0 0.0 - 0.2 K/UL    ABS. IMM. GRANS. 0.0 0.0 - 0.5 K/UL   TSH 3RD GENERATION    Collection Time: 11/02/19  5:01 AM   Result Value Ref Range    TSH 0.423 0.358 - 3.740 uIU/mL       All Micro Results     None          Other Studies:  No results found. Assessment and Plan:     Hospital Problems as of 11/2/2019 Date Reviewed: 10/31/2019          Codes Class Noted - Resolved POA    Essential hypertension ICD-10-CM: I10  ICD-9-CM: 401.9  11/1/2019 - Present Unknown        GERD (gastroesophageal reflux disease) ICD-10-CM: K21.9  ICD-9-CM: 530.81  11/1/2019 - Present Unknown        * (Principal) Cervical myelopathy ([facility] ICD-10-CM: G95.9  ICD-9-CM: 721.1  10/31/2019 - Present Unknown              PLAN:  Hypertension   Likely essential hypertension. Will start calcium channel blocker, Amlodipine. Will check BMP, TSH, CXR tomorrow. Will stop Metoprolol for now. Will monitor. Monitor blood pressure and manage accordingly.    Avoid NSAIDs.      GERD   On PPI     Cervical myelopathy   S/P surgery   As per neurosurgical team.         Signed:  Rell Musa NP

## 2019-11-02 NOTE — PROGRESS NOTES
Problem: Mobility Impaired (Adult and Pediatric)  Goal: *Acute Goals and Plan of Care  Description  Acute PT Goals:  (1.)Raleigh Medellin Check will move from supine to sit and sit to supine , scoot up and down and roll side to side with MODIFIED INDEPENDENCE within 7 treatment day(s). (2.)Raleigh Medellin Check will transfer from bed to chair and chair to bed with MODIFIED INDEPENDENCE using the least restrictive device within 7 treatment day(s). (3.)Raleigh Desaiita Check will ambulate with MODIFIED INDEPENDENCE for 500+ feet with the least restrictive device within 7 treatment day(s). (4.)Raleigh Desaiita Check will perform standing static and dynamic balance activities x 20 minutes with MODIFIED INDEPENDENCE to improve safety within 7 treatment day(s). (5.)Raleigh Desaiita Check will perform bilateral lower extremity exercises x 20 min for HEP with INDEPENDENCE to improve strength, endurance, and functional mobility within 7 treatment day(s). All goals ongoing 11/2/2019     PHYSICAL THERAPY: Daily Note and PM 11/2/2019  INPATIENT: PT Visit Days : 2  Payor: Rafaela Contreras / Plan: Marilu Reed [id number] / Product Type: PPO /       NAME/AGE/GENDER: Narciso Wilkinson is a 62 y.o. male   PRIMARY DIAGNOSIS: Cervical spinal stenosis [M48.02]  Cervical myelopathy (HCC) [G95.9] Cervical myelopathy (HCC)   Cervical myelopathy (HCC)    Procedure(s) (LRB):  C3-C7 POSTERIOR CERVICAL LAMINECTOMY WITH LATERAL MASS SCREW FIXATION AND FUSION / SSEP NEUROMONITORING (N/A)  2 Days Post-Op  ICD-10: Treatment Diagnosis:   · Difficulty in walking, Not elsewhere classified (R26.2)  · Other abnormalities of gait and mobility (R26.89)  · Cervicalgia (M54.2)   Precaution/Allergies:  Sulfa (sulfonamide antibiotics)   Cogswell Collar     ASSESSMENT:     Mr. Hakeem Jules is a 62year old M who presents s/p C3-C7 posterior cervical laminectomy with lateral mass screw fixation and fusion.  Prior to hospital admission pt lives with his mom and sister in a one story home with no step(s) to enter, has a ramp. Pt endorses no falls in past 6 months. Prior to admission Mr. Cullen Cox uses no DME for mobility. He had been experiencing RUE numbness, pain, and weakness prior to surgery, but reports upon evaluation that it has much improved since surgery. Upon entering, pt resting in bed, aspen collar donned, agreeable to PT evaluation. he reports 7/10 pain in his neck at rest.   BLE assessment indicates sensation to light touch intact, AROM WFL, and strength WFL. Pt performed supine > sit with verbal instruction in log roll technique Min A, sitting EOB with good sitting balance control. Sit > stand with CGA x 1 to MIN A x 1 using RW. Treatment initiated to include ambulation x 100 ft with CGA/RW, cues for posture, sequencing, safety. Pt able to perform sit <> stand transfers with BUE assist at arm rests with CGA. Able to safely maintain static balance control with intermittent BUE assist at [place] [id number]. Pt returned to bedside chair. Patient performing therapeutic exercise in the bedside chair. At end of session pt resting in chair with all needs within reach, PCT present. Pt presents as functioning below his baseline, with deficits in mobility including transfers, gait, balance, and activity tolerance. Progressing toward established goals. All movement is slow and verbal and manual cuing for all transfers and ambulation. Pt will benefit from skilled therapy services to address stated deficits to promote return to highest level of function, independence, and safety. Will continue to follow. PM UPDATE:  Left LE pain after sitting so limited distance 15 feet return to bed with 2 wheel rolling walker. There act up to min A x 1. Return to supine. This section established at most recent assessment   PROBLEM LIST (Impairments causing functional limitations):  1. Decreased Strength  2. Decreased Transfer Abilities  3.  Decreased Ambulation Ability/Technique  4. Decreased Balance  5. Increased Pain  6. Decreased Activity Tolerance   INTERVENTIONS PLANNED: (Benefits and precautions of physical therapy have been discussed with the patient.)  1. Balance Exercise  2. Bed Mobility  3. Family Education  4. Gait Training  5. Home Exercise Program (HEP)  6. Neuromuscular Re-education/Strengthening  7. Range of Motion (ROM)  8. Therapeutic Activites  9. Therapeutic Exercise/Strengthening  10. Transfer Training     TREATMENT PLAN: Frequency/Duration: twice daily for duration of hospital stay  Rehabilitation Potential For Stated Goals: Good     REHAB RECOMMENDATIONS (at time of discharge pending progress):    Placement: It is my opinion, based on this patient's performance to date, that Mr. Vanessa Rich may benefit from [address] after discharge due to the functional deficits listed above that are likely to improve with skilled rehabilitation because he/she has multiple medical issues that affect his/her functional mobility in the community vs. HOME WITH NO NEEDS pending progress with therapy and medical status. Equipment:    None at this time              HISTORY:   History of Present Injury/Illness (Reason for Referral):  Per H&P: \"Raleigh Garcia is a 62 y.o. male who presents today for follow-up evaluation of right upper extremity numbness, right upper extremity pain and right upper extremity weakness. Patient was last seen on 9/30/2019 at that time his symptoms have been present for 7 months time. States that his right upper extremity weakness has been progressive. He also feels that if his legs are heavy and that is the leaning or tilting to one side or another when he is walking. Over the last week he started to endorse pain between the shoulder blades in his neck that is been new.  The patient has MRI cervical spine without contrast performed on 6/29/2018 that demonstrates large central disc herniation that contacts the ventral surface of the cervical cord and C3-C4, there is also multilevel severe cervical spinal stenosis from C3 to C7 secondary to degenerative disc disease superimposed on a congenitally narrow cervical canal.  Patient has a CT cervical spine without performed on 8/16/2019 that demonstrates the previously noted severe cervical spinal stenosis from C3-C7. Patient is scheduled to undergo a C3-C7 posterior cervical laminectomy and fusion on 10/31/2019. He has had no changes in his past medical history. He denies taking any antiplatelet or anticoagulant medications. \"  Past Medical History/Comorbidities:   Mr. Kevin Nguyen  has a past medical history of Bleeds easily [facility]), Cervical stenosis of spine, GERD (gastroesophageal reflux disease), History of kidney stones (2018), History of shingles (2016), Kidney stone (09/2019), MSSA (methicillin susceptible Staphylococcus aureus) (10/23/2019), and Neck pain. Mr. Kevin Nguyen  has a past surgical history that includes hx knee arthroscopy (Bilateral, early 2000); hx tonsillectomy; hx cataract removal (Right); hx lap cholecystectomy (2010); pr neurological procedure unlisted (1996); hx lithotripsy (11/2018,10/4/2019); hx colonoscopy; and hx wisdom teeth extraction. Social History/Living Environment:   Home Environment: Private residence  # Steps to Enter: 0  Wheelchair Ramp: Yes  One/Two Story Residence: One story  Living Alone: No  Support Systems: Family member(s)  Patient Expects to be Discharged to[de-identified] Private residence  Current DME Used/Available at Home: None  Prior Level of Function/Work/Activity:  Independent, lives with family, no falls but does endorse some unsteadiness.      Number of Personal Factors/Comorbidities that affect the Plan of Care: 1-2: MODERATE COMPLEXITY   EXAMINATION:   Most Recent Physical Functioning:   Gross Assessment:                  Posture:     Balance:  Sitting: Intact  Standing: Impaired  Standing - Static: Good  Standing - Dynamic : Fair Bed Mobility:  Rolling: Contact guard assistance  Supine to Sit: Minimum assistance  Sit to Supine: Minimum assistance  Scooting: Contact guard assistance  Wheelchair Mobility:     Transfers:  Sit to Stand: Contact guard assistance  Stand to Sit: Contact guard assistance  Bed to Chair: Contact guard assistance  Gait:     Base of Support: Center of gravity altered  Speed/Maria Victoria: Fluctuations; Pace decreased (<100 feet/min)  Step Length: Left shortened;Right shortened  Swing Pattern: Left asymmetrical;Right asymmetrical  Stance: Time;Weight shift  Gait Abnormalities: Decreased step clearance  Distance (ft): 15 Feet (ft)  Assistive Device: (cocnducting the IV pole. Patient with Antony Dec)  Ambulation - Level of Assistance: Contact guard assistance  Interventions: Manual cues; Safety awareness training      Body Structures Involved:  1. Nerves  2. Bones  3. Joints  4. Muscles Body Functions Affected:  1. Neuromusculoskeletal  2. Movement Related Activities and Participation Affected:  1. General Tasks and Demands  2. Mobility   Number of elements that affect the Plan of Care: 4+: HIGH COMPLEXITY   CLINICAL PRESENTATION:   Presentation: Stable and uncomplicated: LOW COMPLEXITY   CLINICAL DECISION MAKIN[address] [place] [address] Box [facility] AM-PAC 6 Clicks   Basic Mobility Inpatient Short Form  How much difficulty does the patient currently have. .. Unable A Lot A Little None   1. Turning over in bed (including adjusting bedclothes, sheets and blankets)? ? 1   ? 2   ? 3   ? 4   2. Sitting down on and standing up from a chair with arms ( e.g., wheelchair, bedside commode, etc.)   ? 1   ? 2   ? 3   ? 4   3. Moving from lying on back to sitting on the side of the bed?   ? 1   ? 2   ? 3   ? 4   How much help from another person does the patient currently need. .. Total A Lot A Little None   4. Moving to and from a bed to a chair (including a wheelchair)? ? 1   ? 2   ? 3   ? 4   5. Need to walk in hospital room? ? 1   ? 2   ? 3   ? 4   6.   Climbing 3-5 steps with a railing? ? 1   ? 2   ? 3   ? 4   © 2007, Trustees of [address] [place] [address] [address] [id number], under license to [employer]. All rights reserved      Score:  Initial: 18 Most Recent: X (Date: -- )    Interpretation of Tool:  Represents activities that are increasingly more difficult (i.e. Bed mobility, Transfers, Gait). Medical Necessity:     · Patient is expected to demonstrate progress in strength, range of motion, balance, coordination and functional technique  ·  to improve safety during all mobility and promote return to prior level of function. · .  Reason for Services/Other Comments:  · Patient continues to require skilled intervention due to medical complications and mobility deficits which impact his level of function, safety, and independence as indicated above. · .   Use of outcome tool(s) and clinical judgement create a POC that gives a: Clear prediction of patient's progress: LOW COMPLEXITY        TREATMENT:   (In addition to Assessment/Re-Assessment sessions the following treatments were rendered)   Pre-treatment Symptoms/Complaints:  Stiff & sore ASPEN COLLAR   Pain: Initial:   Pain Intensity 1: 7  Pain Location 1: Neck  Pain Orientation 1: Posterior  Pain Intervention(s) 1: Repositioned  Post Session:  7/10     Therapeutic Activity: (   25 Minutes): Therapeutic activities including Bed transfers, Chair transfers, Ambulation on level ground and safety education  to improve mobility, strength, balance and coordination. Required minimal Manual cues; Safety awareness training to promote static and dynamic balance in standing and promote coordination of bilateral, upper extremity(s), lower extremity(s). Therapeutic Exercise: (  0 mins):  Exercises per grid below to improve mobility, strength, balance and coordination. Required minimal visual, verbal, manual and tactile cues to promote proper body alignment, promote proper body posture and promote proper body mechanics.   Progressed repetitions as indicated. Date:  11/2/2019 AM Date:   Date:     Activity/Exercise Parameters Parameters Parameters   AP's  20 x's      LAQ's 20 x's     Hip ABD  20 x's     Hip Flexion  20 x's                             Braces/Orthotics/Lines/Etc:   · IV  · drain cervical   · O2 Device: Room air  Treatment/Session Assessment:    · Response to Treatment:  see above  · Interdisciplinary Collaboration:   o Physical Therapist  o Registered Nurse  · After treatment position/precautions:   o Supine in bed  o Bed alarm/tab alert on  o Bed/Chair-wheels locked  o Bed in low position  o Call light within reach  o RN notified  o Side rails x 3   · Compliance with Program/Exercises: Will assess as treatment progresses   · Recommendations/Intent for next treatment session: \"Next visit will focus on advancements to more challenging activities and reduction in assistance provided\".     Total Treatment Duration:  PT Patient Time In/Time Out  Time In: 5646  Time Out: 109 [address]

## 2019-11-02 NOTE — PROGRESS NOTES
Problem: Mobility Impaired (Adult and Pediatric)  Goal: *Acute Goals and Plan of Care  Description  Acute PT Goals:  (1.)Raleigh Sher will move from supine to sit and sit to supine , scoot up and down and roll side to side with MODIFIED INDEPENDENCE within 7 treatment day(s). (2.)Raleigh Sher will transfer from bed to chair and chair to bed with MODIFIED INDEPENDENCE using the least restrictive device within 7 treatment day(s). (3.)Raleigh Sher will ambulate with MODIFIED INDEPENDENCE for 500+ feet with the least restrictive device within 7 treatment day(s). (4.)Raleigh Sher will perform standing static and dynamic balance activities x 20 minutes with MODIFIED INDEPENDENCE to improve safety within 7 treatment day(s). (5.)Raleigh Sher will perform bilateral lower extremity exercises x 20 min for HEP with INDEPENDENCE to improve strength, endurance, and functional mobility within 7 treatment day(s). All goals ongoing 11/2/2019     PHYSICAL THERAPY: Initial Assessment, Daily Note and AM 11/2/2019  INPATIENT: PT Visit Days : 1  Payor: Kranthi Lomax / Plan: Marilu Reed 954 / Product Type: PPO /       NAME/AGE/GENDER: Kashmir Keene is a 62 y.o. male   PRIMARY DIAGNOSIS: Cervical spinal stenosis [M48.02]  Cervical myelopathy (HCC) [G95.9] Cervical myelopathy (HCC)   Cervical myelopathy (HCC)    Procedure(s) (LRB):  C3-C7 POSTERIOR CERVICAL LAMINECTOMY WITH LATERAL MASS SCREW FIXATION AND FUSION / SSEP NEUROMONITORING (N/A)  2 Days Post-Op  ICD-10: Treatment Diagnosis:   · Difficulty in walking, Not elsewhere classified (R26.2)  · Other abnormalities of gait and mobility (R26.89)  · Cervicalgia (M54.2)   Precaution/Allergies:  Sulfa (sulfonamide antibiotics)   [place] Collar     ASSESSMENT:     Mr. Derrick Herrera is a 62year old M who presents s/p C3-C7 posterior cervical laminectomy with lateral mass screw fixation and fusion.  Prior to hospital admission pt lives with his mom and sister in a one story home with no step(s) to enter, has a ramp. Pt endorses no falls in past 6 months. Prior to admission Mr. Billie Upton uses no DME for mobility. He had been experiencing RUE numbness, pain, and weakness prior to surgery, but reports upon evaluation that it has much improved since surgery. Upon entering, pt resting in bed, aspen collar donned, agreeable to PT evaluation. he reports 7/10 pain in his neck at rest.   BLE assessment indicates sensation to light touch intact, AROM WFL, and strength WFL. Pt performed supine > sit with verbal instruction in log roll technique Min A, sitting EOB with good sitting balance control. Sit > stand with CGA x 1 to MIN A x 1 using RW. Treatment initiated to include ambulation x 100 ft with CGA/RW, cues for posture, sequencing, safety. Pt able to perform sit <> stand transfers with BUE assist at arm rests with CGA. Able to safely maintain static balance control with intermittent BUE assist at [place] [id number]. Pt returned to bedside chair. Patient performing therapeutic exercise in the bedside chair. At end of session pt resting in chair with all needs within reach, PCT present. Pt presents as functioning below his baseline, with deficits in mobility including transfers, gait, balance, and activity tolerance. Progressing toward established goals. All movement is slow and verbal and manual cuing for all transfers and ambulation. Pt will benefit from skilled therapy services to address stated deficits to promote return to highest level of function, independence, and safety. Will continue to follow. This section established at most recent assessment   PROBLEM LIST (Impairments causing functional limitations):  1. Decreased Strength  2. Decreased Transfer Abilities  3. Decreased Ambulation Ability/Technique  4. Decreased Balance  5. Increased Pain  6.  Decreased Activity Tolerance   INTERVENTIONS PLANNED: (Benefits and precautions of physical therapy have been discussed with the patient.)  1. Balance Exercise  2. Bed Mobility  3. Family Education  4. Gait Training  5. Home Exercise Program (HEP)  6. Neuromuscular Re-education/Strengthening  7. Range of Motion (ROM)  8. Therapeutic Activites  9. Therapeutic Exercise/Strengthening  10. Transfer Training     TREATMENT PLAN: Frequency/Duration: twice daily for duration of hospital stay  Rehabilitation Potential For Stated Goals: Good     REHAB RECOMMENDATIONS (at time of discharge pending progress):    Placement: It is my opinion, based on this patient's performance to date, that Mr. Tash Bah may benefit from [address] after discharge due to the functional deficits listed above that are likely to improve with skilled rehabilitation because he/she has multiple medical issues that affect his/her functional mobility in the community vs. HOME WITH NO NEEDS pending progress with therapy and medical status. Equipment:    None at this time              HISTORY:   History of Present Injury/Illness (Reason for Referral):  Per H&P: \"Raleigh Cervantes is a 62 y.o. male who presents today for follow-up evaluation of right upper extremity numbness, right upper extremity pain and right upper extremity weakness. Patient was last seen on 9/30/2019 at that time his symptoms have been present for 7 months time. States that his right upper extremity weakness has been progressive. He also feels that if his legs are heavy and that is the leaning or tilting to one side or another when he is walking. Over the last week he started to endorse pain between the shoulder blades in his neck that is been new.  The patient has MRI cervical spine without contrast performed on 6/29/2018 that demonstrates large central disc herniation that contacts the ventral surface of the cervical cord and C3-C4, there is also multilevel severe cervical spinal stenosis from C3 to C7 secondary to degenerative disc disease superimposed on a congenitally narrow cervical canal.  Patient has a CT cervical spine without performed on 8/16/2019 that demonstrates the previously noted severe cervical spinal stenosis from C3-C7. Patient is scheduled to undergo a C3-C7 posterior cervical laminectomy and fusion on 10/31/2019. He has had no changes in his past medical history. He denies taking any antiplatelet or anticoagulant medications. \"  Past Medical History/Comorbidities:   Mr. Cullen Cox  has a past medical history of Bleeds easily [facility]), Cervical stenosis of spine, GERD (gastroesophageal reflux disease), History of kidney stones (2018), History of shingles (2016), Kidney stone (09/2019), MSSA (methicillin susceptible Staphylococcus aureus) (10/23/2019), and Neck pain. Mr. Cullen Cox  has a past surgical history that includes hx knee arthroscopy (Bilateral, early 2000); hx tonsillectomy; hx cataract removal (Right); hx lap cholecystectomy (2010); pr neurological procedure unlisted (1996); hx lithotripsy (11/2018,10/4/2019); hx colonoscopy; and hx wisdom teeth extraction. Social History/Living Environment:   Home Environment: Private residence  # Steps to Enter: 0  Wheelchair Ramp: Yes  One/Two Story Residence: One story  Living Alone: No  Support Systems: Family member(s)  Patient Expects to be Discharged to[de-identified] Private residence  Current DME Used/Available at Home: None  Prior Level of Function/Work/Activity:  Independent, lives with family, no falls but does endorse some unsteadiness.      Number of Personal Factors/Comorbidities that affect the Plan of Care: 1-2: MODERATE COMPLEXITY   EXAMINATION:   Most Recent Physical Functioning:   Gross Assessment:                  Posture:     Balance:  Sitting: Intact  Standing: Impaired  Standing - Static: Good  Standing - Dynamic : Fair Bed Mobility:  Rolling: Contact guard assistance  Supine to Sit: Minimum assistance  Sit to Supine: Minimum assistance  Scooting: Contact guard assistance  Wheelchair Mobility:     Transfers:  Sit to Stand: Contact guard assistance  Stand to Sit: Contact guard assistance  Bed to Chair: Contact guard assistance  Gait:     Base of Support: Center of gravity altered  Speed/Maria Victoria: Pace decreased (<100 feet/min); Shuffled; Slow  Step Length: Left shortened;Right shortened  Swing Pattern: Left asymmetrical;Right asymmetrical  Stance: Time;Weight shift  Gait Abnormalities: Decreased step clearance;Shuffling gait; Steppage gait; Step to gait;Trunk sway increased  Distance (ft): 100 Feet (ft)  Assistive Device: (cocnducting the IV pole. Patient with Vibha Caballero)  Ambulation - Level of Assistance: Contact guard assistance  Interventions: Manual cues; Safety awareness training; Tactile cues; Verbal cues; Visual/Demos      Body Structures Involved:  1. Nerves  2. Bones  3. Joints  4. Muscles Body Functions Affected:  1. Neuromusculoskeletal  2. Movement Related Activities and Participation Affected:  1. General Tasks and Demands  2. Mobility   Number of elements that affect the Plan of Care: 4+: HIGH COMPLEXITY   CLINICAL PRESENTATION:   Presentation: Stable and uncomplicated: LOW COMPLEXITY   CLINICAL DECISION MAKIN [facility] [address] [id number] AM-PAC 6 Clicks   Basic Mobility Inpatient Short Form  How much difficulty does the patient currently have. .. Unable A Lot A Little None   1. Turning over in bed (including adjusting bedclothes, sheets and blankets)? ? 1   ? 2   ? 3   ? 4   2. Sitting down on and standing up from a chair with arms ( e.g., wheelchair, bedside commode, etc.)   ? 1   ? 2   ? 3   ? 4   3. Moving from lying on back to sitting on the side of the bed?   ? 1   ? 2   ? 3   ? 4   How much help from another person does the patient currently need. .. Total A Lot A Little None   4. Moving to and from a bed to a chair (including a wheelchair)? ? 1   ? 2   ? 3   ? 4   5. Need to walk in hospital room? ? 1   ? 2   ? 3   ? 4   6. Climbing 3-5 steps with a railing?    ? 1   ? 2   ? 3   ? 4   © 2007, Trustees of [facility] [address] [address], under license to [employer]. All rights reserved      Score:  Initial: 18 Most Recent: X (Date: -- )    Interpretation of Tool:  Represents activities that are increasingly more difficult (i.e. Bed mobility, Transfers, Gait). Medical Necessity:     · Patient is expected to demonstrate progress in strength, range of motion, balance, coordination and functional technique  ·  to improve safety during all mobility and promote return to prior level of function. · .  Reason for Services/Other Comments:  · Patient continues to require skilled intervention due to medical complications and mobility deficits which impact his level of function, safety, and independence as indicated above. · .   Use of outcome tool(s) and clinical judgement create a POC that gives a: Clear prediction of patient's progress: LOW COMPLEXITY        TREATMENT:   (In addition to Assessment/Re-Assessment sessions the following treatments were rendered)   Pre-treatment Symptoms/Complaints:  Stiff & sore ASPEN COLLAR   Pain: Initial:   Pain Intensity 1: 7  Pain Location 1: Neck  Pain Orientation 1: Posterior  Pain Intervention(s) 1: Repositioned  Post Session:  7/10     Therapeutic Activity: (   30 Minutes): Therapeutic activities including Bed transfers, Chair transfers, Ambulation on level ground and safety education  to improve mobility, strength, balance and coordination. Required minimal Manual cues; Safety awareness training; Tactile cues; Verbal cues; Visual/Demos to promote static and dynamic balance in standing and promote coordination of bilateral, upper extremity(s), lower extremity(s). Therapeutic Exercise: (  10 mins):  Exercises per grid below to improve mobility, strength, balance and coordination. Required minimal visual, verbal, manual and tactile cues to promote proper body alignment, promote proper body posture and promote proper body mechanics. Progressed repetitions as indicated.      Date:  11/2/2019 Date:   Date:     Activity/Exercise Parameters Parameters Parameters   AP's  20 x's      LAQ's 20 x's     Hip ABD  20 x's     Hip Flexion  20 x's                             Braces/Orthotics/Lines/Etc:   · IV  · drain cervical   · O2 Device: Room air  Treatment/Session Assessment:    · Response to Treatment:  see above  · Interdisciplinary Collaboration:   o Physical Therapist  o Registered Nurse  · After treatment position/precautions:   o Up in chair  o Bed/Chair-wheels locked  o Bed in low position  o Call light within reach  o RN notified   · Compliance with Program/Exercises: Will assess as treatment progresses for PM treatment. · Recommendations/Intent for next treatment session: \"Next visit will focus on advancements to more challenging activities and reduction in assistance provided\".     Total Treatment Duration:  PT Patient Time In/Time Out  Time In: 1140  Time Out: [address] [facility] Jamila Barney

## 2019-11-02 NOTE — PROGRESS NOTES
POD#2  Posterior Cervical Fusion C3-C7    S:  Neck pain. Improving    O:  Afebrile VS stable        Moving all ext. Sensation normal    A:  Satisfactory post op course    P:  Continue PT.     Nighat Altamirano MD  Neurosurgery  [phone]

## 2019-11-02 NOTE — PROGRESS NOTES
Problem: Patient Education: Go to Patient Education Activity  Goal: Patient/Family Education  Outcome: Progressing Towards Goal     Problem: Pain  Goal: *Control of Pain  Outcome: Progressing Towards Goal     Problem: Patient Education: Go to Patient Education Activity  Goal: Patient/Family Education  Outcome: Progressing Towards Goal     Problem: Patient Education: Go to Patient Education Activity  Goal: Patient/Family Education  Outcome: Progressing Towards Goal

## 2019-11-03 ENCOUNTER — APPOINTMENT (OUTPATIENT)
Dept: GENERAL RADIOLOGY | Age: 59
DRG: 472 | End: 2019-11-03
Attending: NEUROLOGICAL SURGERY
Payer: COMMERCIAL

## 2019-11-03 PROCEDURE — 74011250636 HC RX REV CODE- 250/636: Performed by: NEUROLOGICAL SURGERY

## 2019-11-03 PROCEDURE — 74011250637 HC RX REV CODE- 250/637: Performed by: NURSE PRACTITIONER

## 2019-11-03 PROCEDURE — 65270000029 HC RM PRIVATE

## 2019-11-03 PROCEDURE — 74011250637 HC RX REV CODE- 250/637: Performed by: NEUROLOGICAL SURGERY

## 2019-11-03 PROCEDURE — 97530 THERAPEUTIC ACTIVITIES: CPT

## 2019-11-03 PROCEDURE — 73562 X-RAY EXAM OF KNEE 3: CPT

## 2019-11-03 RX ADMIN — Medication 2 G: at 08:59

## 2019-11-03 RX ADMIN — AMLODIPINE BESYLATE 10 MG: 10 TABLET ORAL at 08:58

## 2019-11-03 RX ADMIN — Medication 2 G: at 00:45

## 2019-11-03 RX ADMIN — Medication 5 ML: at 06:34

## 2019-11-03 RX ADMIN — Medication 10 ML: at 13:01

## 2019-11-03 RX ADMIN — HYDROCODONE BITARTRATE AND ACETAMINOPHEN 1 TABLET: 10; 325 TABLET ORAL at 08:58

## 2019-11-03 RX ADMIN — Medication 1 AMPULE: at 08:59

## 2019-11-03 RX ADMIN — HYDROCODONE BITARTRATE AND ACETAMINOPHEN 1 TABLET: 10; 325 TABLET ORAL at 16:48

## 2019-11-03 RX ADMIN — HYDROCODONE BITARTRATE AND ACETAMINOPHEN 1 TABLET: 10; 325 TABLET ORAL at 03:25

## 2019-11-03 RX ADMIN — CYCLOBENZAPRINE HYDROCHLORIDE 10 MG: 10 TABLET, FILM COATED ORAL at 16:48

## 2019-11-03 RX ADMIN — HYDROCODONE BITARTRATE AND ACETAMINOPHEN 1 TABLET: 10; 325 TABLET ORAL at 23:46

## 2019-11-03 RX ADMIN — Medication 1 AMPULE: at 20:24

## 2019-11-03 RX ADMIN — HYDROCODONE BITARTRATE AND ACETAMINOPHEN 1 TABLET: 10; 325 TABLET ORAL at 13:16

## 2019-11-03 RX ADMIN — Medication 10 ML: at 20:24

## 2019-11-03 NOTE — PROGRESS NOTES
Problem: Mobility Impaired (Adult and Pediatric)  Goal: *Acute Goals and Plan of Care  Description  Acute PT Goals:  (1.)Raleigh Henning will move from supine to sit and sit to supine , scoot up and down and roll side to side with MODIFIED INDEPENDENCE within 7 treatment day(s). (2.)Raleigh Henning will transfer from bed to chair and chair to bed with MODIFIED INDEPENDENCE using the least restrictive device within 7 treatment day(s). (3.)Raleigh Henning will ambulate with MODIFIED INDEPENDENCE for 500+ feet with the least restrictive device within 7 treatment day(s). (4.)Raleigh Henning will perform standing static and dynamic balance activities x 20 minutes with MODIFIED INDEPENDENCE to improve safety within 7 treatment day(s). (5.)Raleigh Henning will perform bilateral lower extremity exercises x 20 min for HEP with INDEPENDENCE to improve strength, endurance, and functional mobility within 7 treatment day(s). All goals ongoing 11/3/2019     PHYSICAL THERAPY: Daily Note and PM 11/3/2019  INPATIENT: PT Visit Days : 3  Payor: Neelam Husain / Plan: [facility] / Product Type: PPO /       NAME/AGE/GENDER: Jt Milligan is a 62 y.o. male   PRIMARY DIAGNOSIS: Cervical spinal stenosis [M48.02]  Cervical myelopathy (HCC) [G95.9] Cervical myelopathy (HCC)   Cervical myelopathy (HCC)    Procedure(s) (LRB):  C3-C7 POSTERIOR CERVICAL LAMINECTOMY WITH LATERAL MASS SCREW FIXATION AND FUSION / SSEP NEUROMONITORING (N/A)  3 Days Post-Op  ICD-10: Treatment Diagnosis:   · Difficulty in walking, Not elsewhere classified (R26.2)  · Other abnormalities of gait and mobility (R26.89)  · Cervicalgia (M54.2)   Precaution/Allergies:  Sulfa (sulfonamide antibiotics)   [place] Collar     ASSESSMENT:     Mr. Crispin Lala is a 62year old M who presents s/p C3-C7 posterior cervical laminectomy with lateral mass screw fixation and fusion.  Prior to hospital admission pt lives with his mom and sister in a one story home with no step(s) to enter, has a ramp. Pt endorses no falls in past 6 months. Prior to admission Mr. Livia Minor uses no DME for mobility. He had been experiencing RUE numbness, pain, and weakness prior to surgery, but reports upon evaluation that it has much improved since surgery. Upon entering, pt resting in bed, aspen collar donned, agreeable to walking. He sat himself up with the a good log roll from an almost flat bed with minimal assistance. He increased his gait distance to about 150 feet with light use of the walker and SBA. He used the bathroom independently then sat up in the chair for lunch. Steady progress. PM:  Patient is able to get in and out of a slightly elevated bed without assistance. He increased his gait distance again slightly without issues. Really no limp with slightly swollen knee and feeling better. Continue to help mobilize. This section established at most recent assessment   PROBLEM LIST (Impairments causing functional limitations):  1. Decreased Strength  2. Decreased Transfer Abilities  3. Decreased Ambulation Ability/Technique  4. Decreased Balance  5. Increased Pain  6. Decreased Activity Tolerance   INTERVENTIONS PLANNED: (Benefits and precautions of physical therapy have been discussed with the patient.)  1. Balance Exercise  2. Bed Mobility  3. Family Education  4. Gait Training  5. Home Exercise Program (HEP)  6. Neuromuscular Re-education/Strengthening  7. Range of Motion (ROM)  8. Therapeutic Activites  9. Therapeutic Exercise/Strengthening  10. Transfer Training     TREATMENT PLAN: Frequency/Duration: twice daily for duration of hospital stay  Rehabilitation Potential For Stated Goals: Good     REHAB RECOMMENDATIONS (at time of discharge pending progress):    Placement:   It is my opinion, based on this patient's performance to date, that Mr. Livia Minor may benefit from [address] after discharge due to the functional deficits listed above that are likely to improve with skilled rehabilitation because he/she has multiple medical issues that affect his/her functional mobility in the community vs. HOME WITH NO NEEDS pending progress with therapy and medical status. Equipment:    None at this time              HISTORY:   History of Present Injury/Illness (Reason for Referral):  Per H&P: \"Raleigh Kearns is a 62 y.o. male who presents today for follow-up evaluation of right upper extremity numbness, right upper extremity pain and right upper extremity weakness. Patient was last seen on 9/30/2019 at that time his symptoms have been present for 7 months time. States that his right upper extremity weakness has been progressive. He also feels that if his legs are heavy and that is the leaning or tilting to one side or another when he is walking. Over the last week he started to endorse pain between the shoulder blades in his neck that is been new. The patient has MRI cervical spine without contrast performed on 6/29/2018 that demonstrates large central disc herniation that contacts the ventral surface of the cervical cord and C3-C4, there is also multilevel severe cervical spinal stenosis from C3 to C7 secondary to degenerative disc disease superimposed on a congenitally narrow cervical canal.  Patient has a CT cervical spine without performed on 8/16/2019 that demonstrates the previously noted severe cervical spinal stenosis from C3-C7. Patient is scheduled to undergo a C3-C7 posterior cervical laminectomy and fusion on 10/31/2019. He has had no changes in his past medical history. He denies taking any antiplatelet or anticoagulant medications.  \"  Past Medical History/Comorbidities:   Mr. Diana Pickard  has a past medical history of Bleeds easily [facility]), Cervical stenosis of spine, GERD (gastroesophageal reflux disease), History of kidney stones (2018), History of shingles (2016), Kidney stone (09/2019), MSSA (methicillin susceptible Staphylococcus aureus) (10/23/2019), and Neck pain. Mr. Livia Minor  has a past surgical history that includes hx knee arthroscopy (Bilateral, early ); hx tonsillectomy; hx cataract removal (Right); hx lap cholecystectomy (); pr neurological procedure unlisted (); hx lithotripsy (2018,10/4/2019); hx colonoscopy; and hx wisdom teeth extraction. Social History/Living Environment:   Home Environment: Private residence  # Steps to Enter: 0  Wheelchair Ramp: Yes  One/Two Story Residence: One story  Living Alone: No  Support Systems: Family member(s)  Patient Expects to be Discharged to[de-identified] Private residence  Current DME Used/Available at Home: None  Prior Level of Function/Work/Activity:  Independent, lives with family, no falls but does endorse some unsteadiness. Number of Personal Factors/Comorbidities that affect the Plan of Care: 1-2: MODERATE COMPLEXITY   EXAMINATION:   Most Recent Physical Functioning:   Gross Assessment:                  Posture:     Balance:    Bed Mobility:  Supine to Sit: Modified independent  Sit to Supine: Modified independent  Wheelchair Mobility:     Transfers:  Sit to Stand: Stand-by assistance  Stand to Sit: Supervision  Gait:     Speed/Maria Victoria: Pace decreased (<100 feet/min)  Gait Abnormalities: Decreased step clearance; Steppage gait  Distance (ft): 175 Feet (ft)  Assistive Device: Brace/Splint; Walker, rolling  Ambulation - Level of Assistance: Stand-by assistance      Body Structures Involved:  1. Nerves  2. Bones  3. Joints  4. Muscles Body Functions Affected:  1. Neuromusculoskeletal  2. Movement Related Activities and Participation Affected:  1. General Tasks and Demands  2.  Mobility   Number of elements that affect the Plan of Care: 4+: HIGH COMPLEXITY   CLINICAL PRESENTATION:   Presentation: Stable and uncomplicated: LOW COMPLEXITY   CLINICAL DECISION MAKIN [facility] [address] [address] AM-PAC 6 Clicks   Basic Mobility Inpatient Short Form  How much difficulty does the patient currently have... Unable A Lot A Little None   1. Turning over in bed (including adjusting bedclothes, sheets and blankets)? ? 1   ? 2   ? 3   ? 4   2. Sitting down on and standing up from a chair with arms ( e.g., wheelchair, bedside commode, etc.)   ? 1   ? 2   ? 3   ? 4   3. Moving from lying on back to sitting on the side of the bed?   ? 1   ? 2   ? 3   ? 4   How much help from another person does the patient currently need. .. Total A Lot A Little None   4. Moving to and from a bed to a chair (including a wheelchair)? ? 1   ? 2   ? 3   ? 4   5. Need to walk in hospital room? ? 1   ? 2   ? 3   ? 4   6. Climbing 3-5 steps with a railing? ? 1   ? 2   ? 3   ? 4   © 2007, Trustees of [address] [place], [place] [address], under license to [employer]. All rights reserved      Score:  Initial: 18 Most Recent: X (Date: -- )    Interpretation of Tool:  Represents activities that are increasingly more difficult (i.e. Bed mobility, Transfers, Gait). Medical Necessity:     · Patient is expected to demonstrate progress in strength, range of motion, balance, coordination and functional technique  ·  to improve safety during all mobility and promote return to prior level of function. · .  Reason for Services/Other Comments:  · Patient continues to require skilled intervention due to medical complications and mobility deficits which impact his level of function, safety, and independence as indicated above. · .   Use of outcome tool(s) and clinical judgement create a POC that gives a: Clear prediction of patient's progress: LOW COMPLEXITY        TREATMENT:   (In addition to Assessment/Re-Assessment sessions the following treatments were rendered)   Pre-treatment Symptoms/Complaints:  Stiff & sore ASPEN COLLAR   Pain: Initial:   Pain Intensity 1: 2  Post Session:  7/10     Therapeutic Activity: (   15 Minutes):   Therapeutic activities including Bed transfers and ambulation on level ground   to improve mobility, strength, balance and coordination. Required minimal   to promote dynamic balance in standing with light use of the walker. Date:  11/2/2019 AM Date:   Date:     Activity/Exercise Parameters Parameters Parameters   AP's  20 x's      LAQ's 20 x's     Hip ABD  20 x's     Hip Flexion  20 x's                             Braces/Orthotics/Lines/Etc:   · aspen brace  Treatment/Session Assessment:    · Response to Treatment:  see above  · Interdisciplinary Collaboration:   o Physical Therapy Assistant  o Registered Nurse  · After treatment position/precautions:   o Supine in bed  o Bed/Chair-wheels locked  o Bed in low position  o Call light within reach  o RN notified   · Compliance with Program/Exercises: Compliant all of the time   · Recommendations/Intent for next treatment session: \"Next visit will focus on advancements to more challenging activities and reduction in assistance provided\".     Total Treatment Duration:  PT Patient Time In/Time Out  Time In: 1430  Time Out: 1445    Xiomara Calderon, PTA

## 2019-11-03 NOTE — PROGRESS NOTES
POD# 3 Posterior cervical fusion    S: C/O knee pain. Not leg. Otherwise doing well    O: afebrile pulse 103       Neurologically stable       Tenderness in left medial knee and patella. No swelling in left leg       No calf pain neg Campos's    A: Doing well Having pain in the left knee. No leg swelling or calf tenderness. Low probability of DVT in my opinion. P: D/C drain      Will ask Hospitalist to see as well.      Wally Mcneill MD  Neurosurgery  [phone]

## 2019-11-03 NOTE — PROGRESS NOTES
Problem: Falls - Risk of  Goal: *Absence of Falls  Description  Document Behzad Zavala Fall Risk and appropriate interventions in the flowsheet.   Outcome: Progressing Towards Goal  Note:   Fall Risk Interventions:  Mobility Interventions: Bed/chair exit alarm         Medication Interventions: Bed/chair exit alarm    Elimination Interventions: Call light in reach    History of Falls Interventions: Bed/chair exit alarm, Consult care management for discharge planning, Evaluate medications/consider consulting pharmacy, Investigate reason for fall         Problem: Patient Education: Go to Patient Education Activity  Goal: Patient/Family Education  Outcome: Progressing Towards Goal     Problem: Pain  Goal: *Control of Pain  Outcome: Progressing Towards Goal     Problem: Patient Education: Go to Patient Education Activity  Goal: Patient/Family Education  Outcome: Progressing Towards Goal     Problem: Patient Education: Go to Patient Education Activity  Goal: Patient/Family Education  Outcome: Progressing Towards Goal

## 2019-11-03 NOTE — PROGRESS NOTES
Problem: Mobility Impaired (Adult and Pediatric)  Goal: *Acute Goals and Plan of Care  Description  Acute PT Goals:  (1.)Raleigh Medellin Check will move from supine to sit and sit to supine , scoot up and down and roll side to side with MODIFIED INDEPENDENCE within 7 treatment day(s). (2.)Raleigh Medellin Check will transfer from bed to chair and chair to bed with MODIFIED INDEPENDENCE using the least restrictive device within 7 treatment day(s). (3.)Raleigh Desaiita Check will ambulate with MODIFIED INDEPENDENCE for 500+ feet with the least restrictive device within 7 treatment day(s). (4.)Raleigh Desaiita Check will perform standing static and dynamic balance activities x 20 minutes with MODIFIED INDEPENDENCE to improve safety within 7 treatment day(s). (5.)Raleigh Desaiita Check will perform bilateral lower extremity exercises x 20 min for HEP with INDEPENDENCE to improve strength, endurance, and functional mobility within 7 treatment day(s). All goals ongoing 11/3/2019     PHYSICAL THERAPY: Daily Note and AM 11/3/2019  INPATIENT: PT Visit Days : 3  Payor: Rafaela Contreras / Plan: Maril[address] Reed [address]54 / Product Type: PPO /       NAME/AGE/GENDER: Narciso Wilkinson is a 62 y.o. male   PRIMARY DIAGNOSIS: Cervical spinal stenosis [M48.02]  Cervical myelopathy (HCC) [G95.9] Cervical myelopathy (HCC)   Cervical myelopathy (HCC)    Procedure(s) (LRB):  C3-C7 POSTERIOR CERVICAL LAMINECTOMY WITH LATERAL MASS SCREW FIXATION AND FUSION / SSEP NEUROMONITORING (N/A)  3 Days Post-Op  ICD-10: Treatment Diagnosis:   · Difficulty in walking, Not elsewhere classified (R26.2)  · Other abnormalities of gait and mobility (R26.89)  · Cervicalgia (M54.2)   Precaution/Allergies:  Sulfa (sulfonamide antibiotics)   [place] Collar     ASSESSMENT:     Mr. Hakeem Jules is a 62year old M who presents s/p C3-C7 posterior cervical laminectomy with lateral mass screw fixation and fusion.  Prior to hospital admission pt lives with his mom and sister in a one story home with no step(s) to enter, has a ramp. Pt endorses no falls in past 6 months. Prior to admission Mr. Conrado Allan uses no DME for mobility. He had been experiencing RUE numbness, pain, and weakness prior to surgery, but reports upon evaluation that it has much improved since surgery. Upon entering, pt resting in bed, aspen collar donned, agreeable to walking. He sat himself up with the a good log roll from an almost flat bed with minimal assistance. He increased his gait distance to about 150 feet with light use of the walker and SBA. He used the bathroom independently then sat up in the chair for lunch. Steady progress. This section established at most recent assessment   PROBLEM LIST (Impairments causing functional limitations):  1. Decreased Strength  2. Decreased Transfer Abilities  3. Decreased Ambulation Ability/Technique  4. Decreased Balance  5. Increased Pain  6. Decreased Activity Tolerance   INTERVENTIONS PLANNED: (Benefits and precautions of physical therapy have been discussed with the patient.)  1. Balance Exercise  2. Bed Mobility  3. Family Education  4. Gait Training  5. Home Exercise Program (HEP)  6. Neuromuscular Re-education/Strengthening  7. Range of Motion (ROM)  8. Therapeutic Activites  9. Therapeutic Exercise/Strengthening  10. Transfer Training     TREATMENT PLAN: Frequency/Duration: twice daily for duration of hospital stay  Rehabilitation Potential For Stated Goals: Good     REHAB RECOMMENDATIONS (at time of discharge pending progress):    Placement: It is my opinion, based on this patient's performance to date, that Mr. Conrado Allan may benefit from [address] after discharge due to the functional deficits listed above that are likely to improve with skilled rehabilitation because he/she has multiple medical issues that affect his/her functional mobility in the community vs. HOME WITH NO NEEDS pending progress with therapy and medical status.   Equipment:  None at this time              HISTORY:   History of Present Injury/Illness (Reason for Referral):  Per H&P: Ellwood Kirks Rogers Ganser is a 62 y.o. male who presents today for follow-up evaluation of right upper extremity numbness, right upper extremity pain and right upper extremity weakness. Patient was last seen on 9/30/2019 at that time his symptoms have been present for 7 months time. States that his right upper extremity weakness has been progressive. He also feels that if his legs are heavy and that is the leaning or tilting to one side or another when he is walking. Over the last week he started to endorse pain between the shoulder blades in his neck that is been new. The patient has MRI cervical spine without contrast performed on 6/29/2018 that demonstrates large central disc herniation that contacts the ventral surface of the cervical cord and C3-C4, there is also multilevel severe cervical spinal stenosis from C3 to C7 secondary to degenerative disc disease superimposed on a congenitally narrow cervical canal.  Patient has a CT cervical spine without performed on 8/16/2019 that demonstrates the previously noted severe cervical spinal stenosis from C3-C7. Patient is scheduled to undergo a C3-C7 posterior cervical laminectomy and fusion on 10/31/2019. He has had no changes in his past medical history. He denies taking any antiplatelet or anticoagulant medications. \"  Past Medical History/Comorbidities:   Mr. Kevin Nguyen  has a past medical history of Bleeds easily [facility]), Cervical stenosis of spine, GERD (gastroesophageal reflux disease), History of kidney stones (2018), History of shingles (2016), Kidney stone (09/2019), MSSA (methicillin susceptible Staphylococcus aureus) (10/23/2019), and Neck pain.   Mr. Kevin Nguyne  has a past surgical history that includes hx knee arthroscopy (Bilateral, early 2000); hx tonsillectomy; hx cataract removal (Right); hx lap cholecystectomy (2010); pr neurological procedure unlisted (); hx lithotripsy (2018,10/4/2019); hx colonoscopy; and hx wisdom teeth extraction. Social History/Living Environment:   Home Environment: Private residence  # Steps to Enter: 0  Wheelchair Ramp: Yes  One/Two Story Residence: One story  Living Alone: No  Support Systems: Family member(s)  Patient Expects to be Discharged to[de-identified] Private residence  Current DME Used/Available at Home: None  Prior Level of Function/Work/Activity:  Independent, lives with family, no falls but does endorse some unsteadiness. Number of Personal Factors/Comorbidities that affect the Plan of Care: 1-2: MODERATE COMPLEXITY   EXAMINATION:   Most Recent Physical Functioning:   Gross Assessment:                  Posture:     Balance:    Bed Mobility:  Supine to Sit: Stand-by assistance  Wheelchair Mobility:     Transfers:  Sit to Stand: Stand-by assistance;Contact guard assistance  Stand to Sit: Supervision  Gait:     Speed/Maria Victoria: Pace decreased (<100 feet/min)  Gait Abnormalities: Decreased step clearance; Steppage gait  Distance (ft): 150 Feet (ft)  Assistive Device: Brace/Splint; Walker, rolling  Ambulation - Level of Assistance: Stand-by assistance;Contact guard assistance      Body Structures Involved:  1. Nerves  2. Bones  3. Joints  4. Muscles Body Functions Affected:  1. Neuromusculoskeletal  2. Movement Related Activities and Participation Affected:  1. General Tasks and Demands  2. Mobility   Number of elements that affect the Plan of Care: 4+: HIGH COMPLEXITY   CLINICAL PRESENTATION:   Presentation: Stable and uncomplicated: LOW COMPLEXITY   CLINICAL DECISION MAKIN [facility] [place] [address] [address] AM-PAC 6 Clicks   Basic Mobility Inpatient Short Form  How much difficulty does the patient currently have. .. Unable A Lot A Little None   1. Turning over in bed (including adjusting bedclothes, sheets and blankets)? ? 1   ? 2   ? 3   ? 4   2.   Sitting down on and standing up from a chair with arms ( e.g., wheelchair, bedside commode, etc.)   ? 1   ? 2   ? 3   ? 4   3. Moving from lying on back to sitting on the side of the bed?   ? 1   ? 2   ? 3   ? 4   How much help from another person does the patient currently need. .. Total A Lot A Little None   4. Moving to and from a bed to a chair (including a wheelchair)? ? 1   ? 2   ? 3   ? 4   5. Need to walk in hospital room? ? 1   ? 2   ? 3   ? 4   6. Climbing 3-5 steps with a railing? ? 1   ? 2   ? 3   ? 4   © 2007, Trustees of [address] [place] [address] [address] [address], under license to [employer]. All rights reserved      Score:  Initial: 18 Most Recent: X (Date: -- )    Interpretation of Tool:  Represents activities that are increasingly more difficult (i.e. Bed mobility, Transfers, Gait). Medical Necessity:     · Patient is expected to demonstrate progress in strength, range of motion, balance, coordination and functional technique  ·  to improve safety during all mobility and promote return to prior level of function. · .  Reason for Services/Other Comments:  · Patient continues to require skilled intervention due to medical complications and mobility deficits which impact his level of function, safety, and independence as indicated above. · .   Use of outcome tool(s) and clinical judgement create a POC that gives a: Clear prediction of patient's progress: LOW COMPLEXITY        TREATMENT:   (In addition to Assessment/Re-Assessment sessions the following treatments were rendered)   Pre-treatment Symptoms/Complaints:  Stiff & sore ASPEN COLLAR   Pain: Initial:   Pain Intensity 1: 4  Post Session:  7/10     Therapeutic Activity: (   25 Minutes): Therapeutic activities including Bed transfers, Chair transfers, toilet trasnfers and ambulation on level ground   to improve mobility, strength, balance and coordination. Required minimal   to promote dynamic balance in standing with light use of the walker.          Date:  11/2/2019 AM Date:   Date:     Activity/Exercise Parameters Parameters Parameters   AP's  20 x's      LAQ's 20 x's     Hip ABD  20 x's     Hip Flexion  20 x's                             Braces/Orthotics/Lines/Etc:   · IV  · drain cervical   · O2 Device: Room air  Treatment/Session Assessment:    · Response to Treatment:  see above  · Interdisciplinary Collaboration:   o Physical Therapy Assistant  o Registered Nurse  · After treatment position/precautions:   o Up in chair  o Bed/Chair-wheels locked  o Bed in low position  o Call light within reach  o RN notified   · Compliance with Program/Exercises: Compliant all of the time   · Recommendations/Intent for next treatment session: \"Next visit will focus on advancements to more challenging activities and reduction in assistance provided\".     Total Treatment Duration:  PT Patient Time In/Time Out  Time In: 1100  Time Out: 1130    Raiza Poole, PTA

## 2019-11-04 ENCOUNTER — HOME HEALTH ADMISSION (OUTPATIENT)
Dept: HOME HEALTH SERVICES | Facility: HOME HEALTH | Age: 59
End: 2019-11-04
Payer: COMMERCIAL

## 2019-11-04 LAB
ANION GAP SERPL CALC-SCNC: 4 MMOL/L (ref 7–16)
ATRIAL RATE: 113 BPM
BASOPHILS # BLD: 0.1 K/UL (ref 0–0.2)
BASOPHILS NFR BLD: 0 % (ref 0–2)
BUN SERPL-MCNC: 21 MG/DL (ref 8–23)
CALCIUM SERPL-MCNC: 9.3 MG/DL (ref 8.3–10.4)
CALCULATED P AXIS, ECG09: 60 DEGREES
CALCULATED R AXIS, ECG10: 31 DEGREES
CALCULATED T AXIS, ECG11: 77 DEGREES
CHLORIDE SERPL-SCNC: 98 MMOL/L (ref 98–107)
CO2 SERPL-SCNC: 34 MMOL/L (ref 21–32)
CREAT SERPL-MCNC: 1.12 MG/DL (ref 0.8–1.5)
DIAGNOSIS, 93000: NORMAL
DIFFERENTIAL METHOD BLD: ABNORMAL
EOSINOPHIL # BLD: 0.3 K/UL (ref 0–0.8)
EOSINOPHIL NFR BLD: 2 % (ref 0.5–7.8)
ERYTHROCYTE [DISTWIDTH] IN BLOOD BY AUTOMATED COUNT: 14.9 % (ref 11.9–14.6)
GLUCOSE SERPL-MCNC: 104 MG/DL (ref 65–100)
HCT VFR BLD AUTO: 38.4 % (ref 41.1–50.3)
HGB BLD-MCNC: 12.1 G/DL (ref 13.6–17.2)
IMM GRANULOCYTES # BLD AUTO: 0.1 K/UL (ref 0–0.5)
IMM GRANULOCYTES NFR BLD AUTO: 1 % (ref 0–5)
LYMPHOCYTES # BLD: 1.4 K/UL (ref 0.5–4.6)
LYMPHOCYTES NFR BLD: 11 % (ref 13–44)
MCH RBC QN AUTO: 25.4 PG (ref 26.1–32.9)
MCHC RBC AUTO-ENTMCNC: 31.5 G/DL (ref 31.4–35)
MCV RBC AUTO: 80.5 FL (ref 79.6–97.8)
MONOCYTES # BLD: 1.8 K/UL (ref 0.1–1.3)
MONOCYTES NFR BLD: 15 % (ref 4–12)
NEUTS SEG # BLD: 8.5 K/UL (ref 1.7–8.2)
NEUTS SEG NFR BLD: 70 % (ref 43–78)
NRBC # BLD: 0 K/UL (ref 0–0.2)
P-R INTERVAL, ECG05: 158 MS
PLATELET # BLD AUTO: 271 K/UL (ref 150–450)
PMV BLD AUTO: 11.5 FL (ref 9.4–12.3)
POTASSIUM SERPL-SCNC: 4.3 MMOL/L (ref 3.5–5.1)
Q-T INTERVAL, ECG07: 306 MS
QRS DURATION, ECG06: 76 MS
QTC CALCULATION (BEZET), ECG08: 419 MS
RBC # BLD AUTO: 4.77 M/UL (ref 4.23–5.6)
SODIUM SERPL-SCNC: 136 MMOL/L (ref 136–145)
VENTRICULAR RATE, ECG03: 113 BPM
WBC # BLD AUTO: 12.1 K/UL (ref 4.3–11.1)

## 2019-11-04 PROCEDURE — 97530 THERAPEUTIC ACTIVITIES: CPT

## 2019-11-04 PROCEDURE — 36415 COLL VENOUS BLD VENIPUNCTURE: CPT

## 2019-11-04 PROCEDURE — 93005 ELECTROCARDIOGRAM TRACING: CPT | Performed by: NEUROLOGICAL SURGERY

## 2019-11-04 PROCEDURE — 74011250637 HC RX REV CODE- 250/637: Performed by: INTERNAL MEDICINE

## 2019-11-04 PROCEDURE — 85025 COMPLETE CBC W/AUTO DIFF WBC: CPT

## 2019-11-04 PROCEDURE — 80048 BASIC METABOLIC PNL TOTAL CA: CPT

## 2019-11-04 PROCEDURE — 74011250637 HC RX REV CODE- 250/637: Performed by: NEUROLOGICAL SURGERY

## 2019-11-04 PROCEDURE — 74011250637 HC RX REV CODE- 250/637: Performed by: NURSE PRACTITIONER

## 2019-11-04 PROCEDURE — 65660000000 HC RM CCU STEPDOWN

## 2019-11-04 RX ORDER — DOCUSATE SODIUM 100 MG/1
100 CAPSULE, LIQUID FILLED ORAL 2 TIMES DAILY
Status: DISCONTINUED | OUTPATIENT
Start: 2019-11-04 | End: 2019-11-05 | Stop reason: HOSPADM

## 2019-11-04 RX ORDER — POLYETHYLENE GLYCOL 3350 17 G/17G
17 POWDER, FOR SOLUTION ORAL DAILY
Status: DISCONTINUED | OUTPATIENT
Start: 2019-11-04 | End: 2019-11-05 | Stop reason: HOSPADM

## 2019-11-04 RX ADMIN — Medication 1 AMPULE: at 20:55

## 2019-11-04 RX ADMIN — POLYETHYLENE GLYCOL 3350 17 G: 17 POWDER, FOR SOLUTION ORAL at 17:13

## 2019-11-04 RX ADMIN — Medication 5 ML: at 21:07

## 2019-11-04 RX ADMIN — CYCLOBENZAPRINE HYDROCHLORIDE 10 MG: 10 TABLET, FILM COATED ORAL at 02:59

## 2019-11-04 RX ADMIN — CYCLOBENZAPRINE HYDROCHLORIDE 10 MG: 10 TABLET, FILM COATED ORAL at 20:55

## 2019-11-04 RX ADMIN — Medication 1 AMPULE: at 08:40

## 2019-11-04 RX ADMIN — HYDROCODONE BITARTRATE AND ACETAMINOPHEN 1 TABLET: 10; 325 TABLET ORAL at 20:55

## 2019-11-04 RX ADMIN — HYDROCODONE BITARTRATE AND ACETAMINOPHEN 1 TABLET: 10; 325 TABLET ORAL at 07:02

## 2019-11-04 RX ADMIN — CYCLOBENZAPRINE HYDROCHLORIDE 10 MG: 10 TABLET, FILM COATED ORAL at 11:36

## 2019-11-04 RX ADMIN — DOCUSATE SODIUM 100 MG: 100 CAPSULE, LIQUID FILLED ORAL at 17:13

## 2019-11-04 RX ADMIN — AMLODIPINE BESYLATE 10 MG: 10 TABLET ORAL at 08:40

## 2019-11-04 RX ADMIN — HYDROCODONE BITARTRATE AND ACETAMINOPHEN 1 TABLET: 10; 325 TABLET ORAL at 11:34

## 2019-11-04 RX ADMIN — HYDROCODONE BITARTRATE AND ACETAMINOPHEN 1 TABLET: 10; 325 TABLET ORAL at 17:16

## 2019-11-04 RX ADMIN — Medication 5 ML: at 07:03

## 2019-11-04 RX ADMIN — Medication 10 ML: at 17:17

## 2019-11-04 NOTE — PROGRESS NOTES
Hospitalist Note     Admit Date:  10/31/2019  5:20 AM   Name:  Ernestine Phillips   Age:  62 y.o.  :  1960   MRN:  [id number]   PCP:  Esperanza Laws MD  Treatment Team: Attending Provider: Rosalina Coronado MD; Consulting Provider: Og Robledo MD; Consulting Provider: Omero Cook MD; Charge Nurse: Leida Shetty; Care Manager: Edgar Burnett LMSW; Consulting Provider: Nan Lopez MD    HPI/Subjective:       Mr. Conrado Allan is a 61 yo male admitted to neurosurgery s/p C3-7 laminectomy/ fusion. He has started norvasc for HTN this admit. Denies significant pain, no chest pain or palpitations, had some improving left knee edema without LE edema or pain, no dyspnea, feels constipated,  Worked with PT and plans for home tomorrow  Rheumatology consulted to review possible CPPD of left knee and edema         19 per HPI         Objective:     Patient Vitals for the past 24 hrs:   Temp Pulse Resp BP SpO2   19 1337  (!) 122      19 1147 97.3 °F (36.3 °C) (!) 120 18 126/76 96 %   19 0800 97.2 °F (36.2 °C) (!) 52 17 152/79 97 %   19 0341 99.5 °F (37.5 °C) 98 18 135/81 95 %   19 2347 99 °F (37.2 °C) 100 18 148/82 95 %   19 2144 98.9 °F (37.2 °C) 89 18 145/80 94 %     Oxygen Therapy  O2 Sat (%): 96 % (19 1147)  Pulse via Oximetry: 83 beats per minute (10/31/19 1457)  O2 Device: Room air (19 0733)  O2 Flow Rate (L/min): 4 l/min (10/31/19 1443)    Estimated body mass index is 26.64 kg/m² as calculated from the following:    Height as of this encounter: 5' 9\" (1.753 m). Weight as of this encounter: 81.8 kg (180 lb 6.4 oz). Intake/Output Summary (Last 24 hours) at 2019 1547  Last data filed at 2019 0703  Gross per 24 hour   Intake    Output 800 ml   Net -800 ml       *Note that automatically entered I/Os may not be accurate; dependent on patient compliance with collection and accurate  by techs.     General: Well nourished. Alert. No distress, in c-collar   CV:   Regular and slightly tachycardic, no edema   Lungs:   CTAB. No wheezing, rhonchi, or rales. anterior  Abdomen:   Soft, nontender, nondistended. Extremities: Warm and dry. Skin:     Left knee mildly edematous, nontender    Neuro:  No gross focal deficits    Data Review:  I have reviewed all labs, meds, and studies from the last 24 hours:    Recent Results (from the past 24 hour(s))   METABOLIC PANEL, BASIC    Collection Time: 11/04/19  4:00 AM   Result Value Ref Range    Sodium 136 136 - 145 mmol/L    Potassium 4.3 3.5 - 5.1 mmol/L    Chloride 98 98 - 107 mmol/L    CO2 34 (H) 21 - 32 mmol/L    Anion gap 4 (L) 7 - 16 mmol/L    Glucose 104 (H) 65 - 100 mg/dL    BUN 21 8 - 23 MG/DL    Creatinine 1.12 0.8 - 1.5 MG/DL    GFR est AA >60 >60 ml/min/1.73m2    GFR est non-AA >60 >60 ml/min/1.73m2    Calcium 9.3 8.3 - 10.4 MG/DL   CBC WITH AUTOMATED DIFF    Collection Time: 11/04/19  4:00 AM   Result Value Ref Range    WBC 12.1 (H) 4.3 - 11.1 K/uL    RBC 4.77 4.23 - 5.6 M/uL    HGB 12.1 (L) 13.6 - 17.2 g/dL    HCT 38.4 (L) 41.1 - 50.3 %    MCV 80.5 79.6 - 97.8 FL    MCH 25.4 (L) 26.1 - 32.9 PG    MCHC 31.5 31.4 - 35.0 g/dL    RDW 14.9 (H) 11.9 - 14.6 %    PLATELET 406 375 - 078 K/uL    MPV 11.5 9.4 - 12.3 FL    ABSOLUTE NRBC 0.00 0.0 - 0.2 K/uL    DF AUTOMATED      NEUTROPHILS 70 43 - 78 %    LYMPHOCYTES 11 (L) 13 - 44 %    MONOCYTES 15 (H) 4.0 - 12.0 %    EOSINOPHILS 2 0.5 - 7.8 %    BASOPHILS 0 0.0 - 2.0 %    IMMATURE GRANULOCYTES 1 0.0 - 5.0 %    ABS. NEUTROPHILS 8.5 (H) 1.7 - 8.2 K/UL    ABS. LYMPHOCYTES 1.4 0.5 - 4.6 K/UL    ABS. MONOCYTES 1.8 (H) 0.1 - 1.3 K/UL    ABS. EOSINOPHILS 0.3 0.0 - 0.8 K/UL    ABS. BASOPHILS 0.1 0.0 - 0.2 K/UL    ABS. IMM.  GRANS. 0.1 0.0 - 0.5 K/UL   EKG, 12 LEAD, INITIAL    Collection Time: 11/04/19  2:00 PM   Result Value Ref Range    Ventricular Rate 113 BPM    Atrial Rate 113 BPM    P-R Interval 158 ms    QRS Duration 76 ms Q-T Interval 306 ms    QTC Calculation (Bezet) 419 ms    Calculated P Axis 60 degrees    Calculated R Axis 31 degrees    Calculated T Axis 77 degrees    Diagnosis       Sinus tachycardia  Possible Left atrial enlargement  Nonspecific T wave abnormality  Abnormal ECG  No previous ECGs available  Confirmed by [facility]  MD ()MELVIN ([id number]) on 11/4/2019 2:05:41 PM          All Micro Results     None          No results found for this visit on 10/31/19. Current Meds:  Current Facility-Administered Medications   Medication Dose Route Frequency    amLODIPine (NORVASC) tablet 10 mg  10 mg Oral DAILY    lidocaine (XYLOCAINE) 10 mg/mL (1 %) injection 0.1 mL  0.1 mL SubCUTAneous PRN    alcohol 62% (NOZIN) nasal  1 Ampule  1 Ampule Topical Q12H    sodium chloride (NS) flush 5-40 mL  5-40 mL IntraVENous Q8H    sodium chloride (NS) flush 5-40 mL  5-40 mL IntraVENous PRN    acetaminophen (TYLENOL) tablet 650 mg  650 mg Oral Q4H PRN    HYDROcodone-acetaminophen (NORCO)  mg tablet 1 Tab  1 Tab Oral Q4H PRN    HYDROmorphone (PF) (DILAUDID) injection 1 mg  1 mg IntraVENous Q4H PRN    ondansetron (ZOFRAN) injection 4 mg  4 mg IntraVENous Q4H PRN    phenol throat spray (CHLORASEPTIC) 1 Spray  1 Spray Oral PRN    cyclobenzaprine (FLEXERIL) tablet 10 mg  10 mg Oral TID PRN    labetalol (NORMODYNE;TRANDATE) injection 10 mg  10 mg IntraVENous Q4H PRN       Other Studies (last 24 hours):  No results found.     Assessment and Plan:     Hospital Problems as of 11/4/2019 Date Reviewed: 10/31/2019          Codes Class Noted - Resolved POA    Essential hypertension ICD-10-CM: I10  ICD-9-CM: 401.9  11/1/2019 - Present Unknown        GERD (gastroesophageal reflux disease) ICD-10-CM: K21.9  ICD-9-CM: 530.81  11/1/2019 - Present Unknown        * (Principal) Cervical myelopathy ([facility] [address] ICD-10-CM: G95.9  ICD-9-CM: 721.1  10/31/2019 - Present Unknown              Plan:  · Sinus tachycardia: EKG personally reviewed as sinus tachycardia, he is presently on remote tele monitoring, he is advised to notify nursing if he develops chest pain/ shortness of breath or LE edema, will monitor as possible stress reaction  · HTN: continued norvasc   · Constipation: add colace/ miralax   · Left knee edema: pending rheum consult     Signed:  Felix Daugherty MD

## 2019-11-04 NOTE — PROGRESS NOTES
SW was notified that pt needs HH PT and a rolling walker. [facility]. F2F completed. Rolling walker ordered from [facility] for delivery to the pt's room on Tuesday morning. No other discharge needs or concerns identified or reported. SW remains available to assist as needed. Care Management Interventions  PCP Verified by CM:  Yes  Last Visit to PCP: 09/18/19  Mode of Transport at Discharge: Self  Transition of Care Consult (CM Consult): [address]: Yes  Discharge Durable Medical Equipment: Yes(rolling walker from [facility])  Physical Therapy Consult: Yes  Occupational Therapy Consult: No  Speech Therapy Consult: No  Confirm Follow Up Transport: Self  Discharge Location  Discharge Placement: Home with home health(SF)

## 2019-11-04 NOTE — PROGRESS NOTES
Problem: Mobility Impaired (Adult and Pediatric)  Goal: *Acute Goals and Plan of Care  Description  Acute PT Goals:  (1.)Raleigh Roth will move from supine to sit and sit to supine , scoot up and down and roll side to side with MODIFIED INDEPENDENCE within 7 treatment day(s). (2.)Raleigh Roth will transfer from bed to chair and chair to bed with MODIFIED INDEPENDENCE using the least restrictive device within 7 treatment day(s). (3.)Raleigh Roth will ambulate with MODIFIED INDEPENDENCE for 500+ feet with the least restrictive device within 7 treatment day(s). (4.)Raleigh Roth will perform standing static and dynamic balance activities x 20 minutes with MODIFIED INDEPENDENCE to improve safety within 7 treatment day(s). (5.)Raleigh Roth will perform bilateral lower extremity exercises x 20 min for HEP with INDEPENDENCE to improve strength, endurance, and functional mobility within 7 treatment day(s). All goals ongoing 11/4/2019     PHYSICAL THERAPY: Daily Note and PM 11/4/2019  INPATIENT: PT Visit Days : 4  Payor: Zeina Harrison / Plan: [address] / Product Type: PPO /       NAME/AGE/GENDER: Yadira uRiz is a 62 y.o. male   PRIMARY DIAGNOSIS: Cervical spinal stenosis [M48.02]  Cervical myelopathy (HCC) [G95.9] Cervical myelopathy (HCC)   Cervical myelopathy (HCC)    Procedure(s) (LRB):  C3-C7 POSTERIOR CERVICAL LAMINECTOMY WITH LATERAL MASS SCREW FIXATION AND FUSION / SSEP NEUROMONITORING (N/A)  4 Days Post-Op  ICD-10: Treatment Diagnosis:   · Difficulty in walking, Not elsewhere classified (R26.2)  · Other abnormalities of gait and mobility (R26.89)  · Cervicalgia (M54.2)   Precaution/Allergies:  Sulfa (sulfonamide antibiotics)   [place] Collar     ASSESSMENT:     Mr. Giacomo Lim is a 62year old M who presents s/p C3-C7 posterior cervical laminectomy with lateral mass screw fixation and fusion.  Prior to hospital admission pt lives with his mom and sister in a one story home with no step(s) to enter, has a ramp. Pt endorses no falls in past 6 months. Prior to admission Mr. Kevin Nguyen uses no DME for mobility. He had been experiencing RUE numbness, pain, and weakness prior to surgery, but reports upon evaluation that it has much improved since surgery. Upon entering, pt resting in bed, aspen collar donned, agreeable to therapy with sister present at the bedside. He required additional time for all mobility this AM, able to transfer to edge of bed with modified independence. He required CGA to stand and ambulated with rolling walker and slow gait speed, however no losses of balance. He returned to room and sat in recliner with supervision as well as ambulated around the room with supervision and rolling walker. He would benefit from a rolling walker at discharge. Good progress towards goals overall. PM Note: Patient sitting in chair and agreeable to physical therapy treatment this PM. He required verbal cues to scoot to edge of bed and stood with SBA and cues for hand placement. He increased ambulation distance to 200' with rolling walker this PM with no losses of balance and improvement in form. He performed sit to stand from various surface heights upon return to room with supervision and ambulated around room with supervision and rolling walker. He then returned to supine with modified independence. Good progress in sit<>stand transfers, ambulation distance this PM. He is progressing well towards goals. This section established at most recent assessment   PROBLEM LIST (Impairments causing functional limitations):  1. Decreased Strength  2. Decreased Transfer Abilities  3. Decreased Ambulation Ability/Technique  4. Decreased Balance  5. Increased Pain  6. Decreased Activity Tolerance   INTERVENTIONS PLANNED: (Benefits and precautions of physical therapy have been discussed with the patient.)  1. Balance Exercise  2. Bed Mobility  3. Family Education  4.  Gait Training  5. Home Exercise Program (HEP)  6. Neuromuscular Re-education/Strengthening  7. Range of Motion (ROM)  8. Therapeutic Activites  9. Therapeutic Exercise/Strengthening  10. Transfer Training     TREATMENT PLAN: Frequency/Duration: twice daily for duration of hospital stay  Rehabilitation Potential For Stated Goals: Good     REHAB RECOMMENDATIONS (at time of discharge pending progress):    Placement: It is my opinion, based on this patient's performance to date, that Mr. Vanessa Rich may benefit from [address] after discharge due to the functional deficits listed above that are likely to improve with skilled rehabilitation because he/she has multiple medical issues that affect his/her functional mobility in the community vs. HOME WITH NO NEEDS pending progress with therapy and medical status. Equipment:    Walkers, Type: Rolling Walker              HISTORY:   History of Present Injury/Illness (Reason for Referral):  Per H&P: \"Raleigh Garcia is a 62 y.o. male who presents today for follow-up evaluation of right upper extremity numbness, right upper extremity pain and right upper extremity weakness. Patient was last seen on 9/30/2019 at that time his symptoms have been present for 7 months time. States that his right upper extremity weakness has been progressive. He also feels that if his legs are heavy and that is the leaning or tilting to one side or another when he is walking. Over the last week he started to endorse pain between the shoulder blades in his neck that is been new.  The patient has MRI cervical spine without contrast performed on 6/29/2018 that demonstrates large central disc herniation that contacts the ventral surface of the cervical cord and C3-C4, there is also multilevel severe cervical spinal stenosis from C3 to C7 secondary to degenerative disc disease superimposed on a congenitally narrow cervical canal.  Patient has a CT cervical spine without performed on 8/16/2019 that demonstrates the previously noted severe cervical spinal stenosis from C3-C7. Patient is scheduled to undergo a C3-C7 posterior cervical laminectomy and fusion on 10/31/2019. He has had no changes in his past medical history. He denies taking any antiplatelet or anticoagulant medications. \"  Past Medical History/Comorbidities:   Mr. Carlos Chinchilla  has a past medical history of Bleeds easily [facility]), Cervical stenosis of spine, GERD (gastroesophageal reflux disease), History of kidney stones (2018), History of shingles (2016), Kidney stone (09/2019), MSSA (methicillin susceptible Staphylococcus aureus) (10/23/2019), and Neck pain. Mr. Carlos Chinchilla  has a past surgical history that includes hx knee arthroscopy (Bilateral, early 2000); hx tonsillectomy; hx cataract removal (Right); hx lap cholecystectomy (2010); pr neurological procedure unlisted (1996); hx lithotripsy (11/2018,10/4/2019); hx colonoscopy; and hx wisdom teeth extraction. Social History/Living Environment:   Home Environment: Private residence  # Steps to Enter: 0  Wheelchair Ramp: Yes  One/Two Story Residence: One story  Living Alone: No  Support Systems: Family member(s)  Patient Expects to be Discharged to[de-identified] Private residence  Current DME Used/Available at Home: None  Prior Level of Function/Work/Activity:  Independent, lives with family, no falls but does endorse some unsteadiness.      Number of Personal Factors/Comorbidities that affect the Plan of Care: 1-2: MODERATE COMPLEXITY   EXAMINATION:   Most Recent Physical Functioning:   Gross Assessment:                  Posture:     Balance:  Sitting: Intact  Standing: Impaired  Standing - Static: Good  Standing - Dynamic : Fair Bed Mobility:  Supine to Sit: Modified independent  Sit to Supine: Modified independent  Scooting: Supervision  Interventions: Safety awareness training;Verbal cues  Wheelchair Mobility:     Transfers:  Sit to Stand: Stand-by assistance;Supervision  Stand to Sit: Supervision;Stand-by assistance  Bed to Chair: Stand-by assistance  Interventions: Safety awareness training;Verbal cues; Visual cues  Gait:     Base of Support: Center of gravity altered  Speed/Maria Victoria: Slow  Gait Abnormalities: Decreased step clearance;Trunk sway increased; Path deviations  Distance (ft): 200 Feet (ft)  Assistive Device: Walker, rolling;Brace/Splint  Ambulation - Level of Assistance: Stand-by assistance;Supervision  Interventions: Safety awareness training      Body Structures Involved:  1. Nerves  2. Bones  3. Joints  4. Muscles Body Functions Affected:  1. Neuromusculoskeletal  2. Movement Related Activities and Participation Affected:  1. General Tasks and Demands  2. Mobility   Number of elements that affect the Plan of Care: 4+: HIGH COMPLEXITY   CLINICAL PRESENTATION:   Presentation: Stable and uncomplicated: LOW COMPLEXITY   CLINICAL DECISION MAKIN[address] [place] [address] AM-PAC 6 Clicks   Basic Mobility Inpatient Short Form  How much difficulty does the patient currently have. .. Unable A Lot A Little None   1. Turning over in bed (including adjusting bedclothes, sheets and blankets)? ? 1   ? 2   ? 3   ? 4   2. Sitting down on and standing up from a chair with arms ( e.g., wheelchair, bedside commode, etc.)   ? 1   ? 2   ? 3   ? 4   3. Moving from lying on back to sitting on the side of the bed?   ? 1   ? 2   ? 3   ? 4   How much help from another person does the patient currently need. .. Total A Lot A Little None   4. Moving to and from a bed to a chair (including a wheelchair)? ? 1   ? 2   ? 3   ? 4   5. Need to walk in hospital room? ? 1   ? 2   ? 3   ? 4   6. Climbing 3-5 steps with a railing? ? 1   ? 2   ? 3   ? 4   © 2007, Trustees of [address] [place] [id number], under license to [employer]. All rights reserved      Score:  Initial: 18 Most Recent: X (Date: -- )    Interpretation of Tool:  Represents activities that are increasingly more difficult (i.e. Bed mobility, Transfers, Gait).     Medical Necessity:     · Patient is expected to demonstrate progress in strength, range of motion, balance, coordination and functional technique  ·  to improve safety during all mobility and promote return to prior level of function. · .  Reason for Services/Other Comments:  · Patient continues to require skilled intervention due to medical complications and mobility deficits which impact his level of function, safety, and independence as indicated above. · .   Use of outcome tool(s) and clinical judgement create a POC that gives a: Clear prediction of patient's progress: LOW COMPLEXITY        TREATMENT:   (In addition to Assessment/Re-Assessment sessions the following treatments were rendered)   Pre-treatment Symptoms/Complaints:  Stiff & sore ASPEN COLLAR   Pain: Initial:   Pain Intensity 1: 4  Post Session:  7/10     Therapeutic Activity: (   23 Minutes): Therapeutic activities including Bed transfer, sit to stand transfers from various surface heights and ambulation on level ground   to improve mobility, strength, balance and coordination. Required minimal Safety awareness training to promote dynamic balance in standing with light use of the walker. Date:  11/2/2019 AM Date:   Date:     Activity/Exercise Parameters Parameters Parameters   AP's  20 x's      LAQ's 20 x's     Hip ABD  20 x's     Hip Flexion  20 x's                             Braces/Orthotics/Lines/Etc:   · aspen brace  Treatment/Session Assessment:    · Response to Treatment:  see above  · Interdisciplinary Collaboration:   o Physical Therapist  o Registered Nurse  · After treatment position/precautions:   o Supine in bed  o Bed/Chair-wheels locked  o Bed in low position  o Call light within reach  o RN notified   · Compliance with Program/Exercises: Compliant all of the time   · Recommendations/Intent for next treatment session:   \"Next visit will focus on advancements to more challenging activities and reduction in assistance provided\".     Total Treatment Duration:  PT Patient Time In/Time Out  Time In: 1420  Time Out: Sharon PT, DPT

## 2019-11-04 NOTE — PROGRESS NOTES
Problem: Falls - Risk of  Goal: *Absence of Falls  Description  Document Jordy Mars Fall Risk and appropriate interventions in the flowsheet.   Outcome: Progressing Towards Goal  Note:   Fall Risk Interventions:  Mobility Interventions: Patient to call before getting OOB         Medication Interventions: Bed/chair exit alarm    Elimination Interventions: Call light in reach    History of Falls Interventions: Bed/chair exit alarm         Problem: Patient Education: Go to Patient Education Activity  Goal: Patient/Family Education  Outcome: Progressing Towards Goal     Problem: Pain  Goal: *Control of Pain  Outcome: Progressing Towards Goal     Problem: Patient Education: Go to Patient Education Activity  Goal: Patient/Family Education  Outcome: Progressing Towards Goal     Problem: Patient Education: Go to Patient Education Activity  Goal: Patient/Family Education  Outcome: Progressing Towards Goal

## 2019-11-04 NOTE — PROGRESS NOTES
NEUROSURGERY PROGRESS NOTE:   Admit Date: 10/31/2019  Subjective: The patient had some left knee swelling and tenderness in the knee not the calf that has subsided. He underwent and X-ray concerning for CPPD disease in the left knee and is awaiting rheumatology consult. He is in good spirits today and states his arms feel stronger after the surgery. Objective:  Visit Vitals  /81 (BP 1 Location: Right arm, BP Patient Position: At rest)   Pulse 98   Temp 99.5 °F (37.5 °C)   Resp 18   Ht 5' 9\" (1.753 m)   Wt 180 lb 6.4 oz (81.8 kg)   SpO2 95%   BMI 26.64 kg/m²     General: No acute distress  Awake, alert, and oriented   Eyes open spontaneously   Patient with 5/5 strength in the bilateral upper and bilateral lower extremities with the exception of 4+/5 right  strength   Posterior cervical mid-line incision well healed with staples on the skin. Inferior patellar region tender to palpation, no leg swelling, negative Campos's sign  Well-fitting cervical collar     Assessment and Plan:   Gena Sosa 62 y.o. male who is now 4 Days Post-Op from a C3-C7 posterior cervical laminectomy and fusion. He is progressing well. His left knee pain is improving. We will have rheumatology see today. Anticipate discharge home tomorrow.    - Continue SCDs for DVT prophylaxis, low concern and probability for DVT   - Regular diet   - Continue bowel regimen  - Patient may be up ad-burt  - Continue PO and IV PRN pain medications   - Continue surgical drain and antibiotic drain prophylaxis    - Await rheumatology recommendations   - Disposition: anticipate discharge home tomorrow     Chloé Manriquez MD

## 2019-11-04 NOTE — PROGRESS NOTES
[address]  Face to Face Encounter    Patients Name: Ashkan Garcia    YOB: 1960    Ordering Physician: Dr. Cindy Mueller    Primary Diagnosis: Cervical spinal stenosis [M48.02]  Cervical myelopathy [facility]) [G95.9]    Date of Face to Face:   11/4/2019                                  Face to Face Encounter findings are related to primary reason for home care:   yes. 1. I certify that the patient needs intermittent care as follows: physical therapy: strengthening, stretching/ROM, transfer training, gait/stair training, balance training and pt/caregiver education    2. I certify that this patient is homebound, that is: 1) patient requires the use of a walker device, special transportation, or assistance of another to leave the home; or 2) patient's condition makes leaving the home medically contraindicated; and 3) patient has a normal inability to leave the home and leaving the home requires considerable and taxing effort. Patient may leave the home for infrequent and short duration for medical reasons, and occasional absences for non-medical reasons. Homebound status is due to the following functional limitations: Limited ambulation secondary to Cervical myelopathy with cervical radiculopathy secondary to multi-level cervical degenerative disc disease, cervical spinal stenosis and spondylosis; s/p C3-C7 posterior cervical instrumented fixation & C3 inferior laminectomie; C4-C6 complete laminectomies; C7 superior laminectomy. Ambulation limited to ad burt feet with the use of a walker (assistive device). Patient currently under activity restrictions secondary to recent surgical procedure, this hinders their ability to safely leave the home. Patient with strength deficits limiting the performance of all ADL's without caregiver assistance or the use of an assistive device.     3. I certify that this patient is under my care and that I, or a nurse practitioner or [phone] assistant, or clinical nurse specialist, or certified nurse midwife, working with me, had a Face-to-Face Encounter that meets the physician Face-to-Face Encounter requirements. The following are the clinical findings from the [address] [place] [address] encounter that support the need for skilled services and is a summary of the encounter:      See hospital chart      Lucinda Cox, SW  11/4/2019      THE FOLLOWING TO BE COMPLETED BY THE COMMUNITY PHYSICIAN:    I concur with the findings described above from the F2F encounter that this patient is homebound and in need of a skilled service.     Certifying Physician: _____________________________________      Printed Certifying Physician Name: _____________________________________    Date: _________________

## 2019-11-04 NOTE — PROGRESS NOTES
Problem: Mobility Impaired (Adult and Pediatric)  Goal: *Acute Goals and Plan of Care  Description  Acute PT Goals:  (1.)Raleigh Mcdonough will move from supine to sit and sit to supine , scoot up and down and roll side to side with MODIFIED INDEPENDENCE within 7 treatment day(s). (2.)Raleigh Mcdonough will transfer from bed to chair and chair to bed with MODIFIED INDEPENDENCE using the least restrictive device within 7 treatment day(s). (3.)Raleigh Mcdonough will ambulate with MODIFIED INDEPENDENCE for 500+ feet with the least restrictive device within 7 treatment day(s). (4.)Raleigh Mcdonough will perform standing static and dynamic balance activities x 20 minutes with MODIFIED INDEPENDENCE to improve safety within 7 treatment day(s). (5.)Raleigh Mcdonough will perform bilateral lower extremity exercises x 20 min for HEP with INDEPENDENCE to improve strength, endurance, and functional mobility within 7 treatment day(s). All goals ongoing 11/4/2019     PHYSICAL THERAPY: Daily Note and AM 11/4/2019  INPATIENT: PT Visit Days : 4  Payor: Dony Teresa / Plan: [address] / Product Type: PPO /       NAME/AGE/GENDER: Gena Sosa is a 62 y.o. male   PRIMARY DIAGNOSIS: Cervical spinal stenosis [M48.02]  Cervical myelopathy (HCC) [G95.9] Cervical myelopathy (HCC)   Cervical myelopathy (HCC)    Procedure(s) (LRB):  C3-C7 POSTERIOR CERVICAL LAMINECTOMY WITH LATERAL MASS SCREW FIXATION AND FUSION / SSEP NEUROMONITORING (N/A)  4 Days Post-Op  ICD-10: Treatment Diagnosis:   · Difficulty in walking, Not elsewhere classified (R26.2)  · Other abnormalities of gait and mobility (R26.89)  · Cervicalgia (M54.2)   Precaution/Allergies:  Sulfa (sulfonamide antibiotics)   [place] Collar     ASSESSMENT:     Mr. Valery Ling is a 62year old M who presents s/p C3-C7 posterior cervical laminectomy with lateral mass screw fixation and fusion.  Prior to hospital admission pt lives with his mom and sister in a one story home with no step(s) to enter, has a ramp. Pt endorses no falls in past 6 months. Prior to admission Mr. Lacey Lakhani uses no DME for mobility. He had been experiencing RUE numbness, pain, and weakness prior to surgery, but reports upon evaluation that it has much improved since surgery. Upon entering, pt resting in bed, aspen collar donned, agreeable to therapy with sister present at the bedside. He required additional time for all mobility this AM, able to transfer to edge of bed with modified independence. He required CGA to stand and ambulated with rolling walker and slow gait speed, however no losses of balance. He returned to room and sat in recliner with supervision as well as ambulated around the room with supervision and rolling walker. He would benefit from a rolling walker at discharge. Good progress towards goals overall. This section established at most recent assessment   PROBLEM LIST (Impairments causing functional limitations):  1. Decreased Strength  2. Decreased Transfer Abilities  3. Decreased Ambulation Ability/Technique  4. Decreased Balance  5. Increased Pain  6. Decreased Activity Tolerance   INTERVENTIONS PLANNED: (Benefits and precautions of physical therapy have been discussed with the patient.)  1. Balance Exercise  2. Bed Mobility  3. Family Education  4. Gait Training  5. Home Exercise Program (HEP)  6. Neuromuscular Re-education/Strengthening  7. Range of Motion (ROM)  8. Therapeutic Activites  9. Therapeutic Exercise/Strengthening  10. Transfer Training     TREATMENT PLAN: Frequency/Duration: twice daily for duration of hospital stay  Rehabilitation Potential For Stated Goals: Good     REHAB RECOMMENDATIONS (at time of discharge pending progress):    Placement:   It is my opinion, based on this patient's performance to date, that Mr. Lacey Lakhani may benefit from [address] after discharge due to the functional deficits listed above that are likely to improve with skilled rehabilitation because he/she has multiple medical issues that affect his/her functional mobility in the community vs. HOME WITH NO NEEDS pending progress with therapy and medical status. Equipment:    Walkers, Type: Rolling Walker              HISTORY:   History of Present Injury/Illness (Reason for Referral):  Per H&P: \"Raleigh Winters is a 62 y.o. male who presents today for follow-up evaluation of right upper extremity numbness, right upper extremity pain and right upper extremity weakness. Patient was last seen on 9/30/2019 at that time his symptoms have been present for 7 months time. States that his right upper extremity weakness has been progressive. He also feels that if his legs are heavy and that is the leaning or tilting to one side or another when he is walking. Over the last week he started to endorse pain between the shoulder blades in his neck that is been new. The patient has MRI cervical spine without contrast performed on 6/29/2018 that demonstrates large central disc herniation that contacts the ventral surface of the cervical cord and C3-C4, there is also multilevel severe cervical spinal stenosis from C3 to C7 secondary to degenerative disc disease superimposed on a congenitally narrow cervical canal.  Patient has a CT cervical spine without performed on 8/16/2019 that demonstrates the previously noted severe cervical spinal stenosis from C3-C7. Patient is scheduled to undergo a C3-C7 posterior cervical laminectomy and fusion on 10/31/2019. He has had no changes in his past medical history. He denies taking any antiplatelet or anticoagulant medications.  \"  Past Medical History/Comorbidities:   Mr. Ashley Martinez  has a past medical history of Bleeds easily [facility]), Cervical stenosis of spine, GERD (gastroesophageal reflux disease), History of kidney stones (2018), History of shingles (2016), Kidney stone (09/2019), MSSA (methicillin susceptible Staphylococcus aureus) (10/23/2019), and Neck pain. Mr. Tash Bah  has a past surgical history that includes hx knee arthroscopy (Bilateral, early 2000); hx tonsillectomy; hx cataract removal (Right); hx lap cholecystectomy (2010); pr neurological procedure unlisted (1996); hx lithotripsy (11/2018,10/4/2019); hx colonoscopy; and hx wisdom teeth extraction. Social History/Living Environment:   Home Environment: Private residence  # Steps to Enter: 0  Wheelchair Ramp: Yes  One/Two Story Residence: One story  Living Alone: No  Support Systems: Family member(s)  Patient Expects to be Discharged to[de-identified] Private residence  Current DME Used/Available at Home: None  Prior Level of Function/Work/Activity:  Independent, lives with family, no falls but does endorse some unsteadiness. Number of Personal Factors/Comorbidities that affect the Plan of Care: 1-2: MODERATE COMPLEXITY   EXAMINATION:   Most Recent Physical Functioning:   Gross Assessment:                  Posture:     Balance:  Sitting: Intact  Standing: Impaired  Standing - Static: Good  Standing - Dynamic : Fair Bed Mobility:  Supine to Sit: Modified independent  Sit to Supine: Modified independent  Interventions: Safety awareness training;Verbal cues  Wheelchair Mobility:     Transfers:  Sit to Stand: Stand-by assistance  Stand to Sit: Stand-by assistance  Bed to Chair: Stand-by assistance  Interventions: Safety awareness training;Verbal cues  Gait:     Base of Support: Center of gravity altered  Speed/Maria Victoria: Slow  Gait Abnormalities: Decreased step clearance;Trunk sway increased  Distance (ft): 150 Feet (ft)  Assistive Device: Walker, rolling;Brace/Splint  Ambulation - Level of Assistance: Stand-by assistance  Interventions: Safety awareness training      Body Structures Involved:  1. Nerves  2. Bones  3. Joints  4. Muscles Body Functions Affected:  1. Neuromusculoskeletal  2. Movement Related Activities and Participation Affected:  1. General Tasks and Demands  2.  Mobility   Number of elements that affect the Plan of Care: 4+: HIGH COMPLEXITY   CLINICAL PRESENTATION:   Presentation: Stable and uncomplicated: LOW COMPLEXITY   CLINICAL DECISION MAKIN[address] [place] [id number] AM-PAC 6 Clicks   Basic Mobility Inpatient Short Form  How much difficulty does the patient currently have. .. Unable A Lot A Little None   1. Turning over in bed (including adjusting bedclothes, sheets and blankets)? ? 1   ? 2   ? 3   ? 4   2. Sitting down on and standing up from a chair with arms ( e.g., wheelchair, bedside commode, etc.)   ? 1   ? 2   ? 3   ? 4   3. Moving from lying on back to sitting on the side of the bed?   ? 1   ? 2   ? 3   ? 4   How much help from another person does the patient currently need. .. Total A Lot A Little None   4. Moving to and from a bed to a chair (including a wheelchair)? ? 1   ? 2   ? 3   ? 4   5. Need to walk in hospital room? ? 1   ? 2   ? 3   ? 4   6. Climbing 3-5 steps with a railing? ? 1   ? 2   ? 3   ? 4   © , Trustees of [address] [place] [address], under license to [employer] ([employer]). All rights reserved      Score:  Initial: 18 Most Recent: X (Date: -- )    Interpretation of Tool:  Represents activities that are increasingly more difficult (i.e. Bed mobility, Transfers, Gait). Medical Necessity:     · Patient is expected to demonstrate progress in strength, range of motion, balance, coordination and functional technique  ·  to improve safety during all mobility and promote return to prior level of function. · .  Reason for Services/Other Comments:  · Patient continues to require skilled intervention due to medical complications and mobility deficits which impact his level of function, safety, and independence as indicated above.    · .   Use of outcome tool(s) and clinical judgement create a POC that gives a: Clear prediction of patient's progress: LOW COMPLEXITY        TREATMENT:   (In addition to Assessment/Re-Assessment sessions the following treatments were rendered) Pre-treatment Symptoms/Complaints:  Stiff & sore [place]EN COLLAR   Pain: Initial:   Pain Intensity 1: 4  Post Session:  7/10     Therapeutic Activity: (   12 Minutes): Therapeutic activities including Bed transfers and ambulation on level ground   to improve mobility, strength, balance and coordination. Required minimal Safety awareness training to promote dynamic balance in standing with light use of the walker. Date:  11/2/2019 AM Date:   Date:     Activity/Exercise Parameters Parameters Parameters   AP's  20 x's      LAQ's 20 x's     Hip ABD  20 x's     Hip Flexion  20 x's                             Braces/Orthotics/Lines/Etc:   · aspen brace  Treatment/Session Assessment:    · Response to Treatment:  see above  · Interdisciplinary Collaboration:   o Physical Therapist  o Registered Nurse  · After treatment position/precautions:   o Up in chair  o Bed/Chair-wheels locked  o Bed in low position  o Call light within reach  o RN notified   · Compliance with Program/Exercises: Compliant all of the time   · Recommendations/Intent for next treatment session: \"Next visit will focus on advancements to more challenging activities and reduction in assistance provided\".     Total Treatment Duration:  PT Patient Time In/Time Out  Time In: 1108  Time Out: Carlitoe Du Jones [address], PT, DPT

## 2019-11-05 ENCOUNTER — APPOINTMENT (OUTPATIENT)
Dept: ULTRASOUND IMAGING | Age: 59
DRG: 472 | End: 2019-11-05
Attending: NEUROLOGICAL SURGERY
Payer: COMMERCIAL

## 2019-11-05 VITALS
OXYGEN SATURATION: 95 % | BODY MASS INDEX: 26.72 KG/M2 | HEART RATE: 117 BPM | DIASTOLIC BLOOD PRESSURE: 81 MMHG | RESPIRATION RATE: 18 BRPM | HEIGHT: 69 IN | TEMPERATURE: 98.4 F | WEIGHT: 180.4 LBS | SYSTOLIC BLOOD PRESSURE: 135 MMHG

## 2019-11-05 PROCEDURE — 74011250637 HC RX REV CODE- 250/637: Performed by: NEUROLOGICAL SURGERY

## 2019-11-05 PROCEDURE — 74011250637 HC RX REV CODE- 250/637: Performed by: NURSE PRACTITIONER

## 2019-11-05 PROCEDURE — 93971 EXTREMITY STUDY: CPT

## 2019-11-05 PROCEDURE — 74011250637 HC RX REV CODE- 250/637: Performed by: INTERNAL MEDICINE

## 2019-11-05 RX ORDER — HYDROCODONE BITARTRATE AND ACETAMINOPHEN 10; 325 MG/1; MG/1
1 TABLET ORAL
Qty: 20 TAB | Refills: 0 | Status: SHIPPED | OUTPATIENT
Start: 2019-11-05 | End: 2019-11-10

## 2019-11-05 RX ORDER — AMLODIPINE BESYLATE 10 MG/1
10 TABLET ORAL DAILY
Qty: 30 TAB | Refills: 0 | Status: SHIPPED | OUTPATIENT
Start: 2019-11-06 | End: 2019-12-02 | Stop reason: SDUPTHER

## 2019-11-05 RX ORDER — CYCLOBENZAPRINE HCL 10 MG
10 TABLET ORAL
Qty: 45 TAB | Refills: 0 | Status: SHIPPED | OUTPATIENT
Start: 2019-11-05 | End: 2019-11-16 | Stop reason: SDUPTHER

## 2019-11-05 RX ADMIN — CYCLOBENZAPRINE HYDROCHLORIDE 10 MG: 10 TABLET, FILM COATED ORAL at 06:32

## 2019-11-05 RX ADMIN — POLYETHYLENE GLYCOL 3350 17 G: 17 POWDER, FOR SOLUTION ORAL at 08:15

## 2019-11-05 RX ADMIN — Medication 1 AMPULE: at 08:15

## 2019-11-05 RX ADMIN — HYDROCODONE BITARTRATE AND ACETAMINOPHEN 1 TABLET: 10; 325 TABLET ORAL at 10:55

## 2019-11-05 RX ADMIN — AMLODIPINE BESYLATE 10 MG: 10 TABLET ORAL at 08:15

## 2019-11-05 RX ADMIN — HYDROCODONE BITARTRATE AND ACETAMINOPHEN 1 TABLET: 10; 325 TABLET ORAL at 06:32

## 2019-11-05 RX ADMIN — Medication 5 ML: at 06:33

## 2019-11-05 RX ADMIN — DOCUSATE SODIUM 100 MG: 100 CAPSULE, LIQUID FILLED ORAL at 08:15

## 2019-11-05 NOTE — PROGRESS NOTES
Problem: Falls - Risk of  Goal: *Absence of Falls  Description  Document Liberty Patches Fall Risk and appropriate interventions in the flowsheet.   Outcome: Progressing Towards Goal  Note:   Fall Risk Interventions:  Mobility Interventions: Bed/chair exit alarm         Medication Interventions: Bed/chair exit alarm    Elimination Interventions: Call light in reach    History of Falls Interventions: Bed/chair exit alarm, Consult care management for discharge planning, Door open when patient unattended, Evaluate medications/consider consulting pharmacy, Investigate reason for fall, Room close to nurse's station         Problem: Patient Education: Go to Patient Education Activity  Goal: Patient/Family Education  Outcome: Progressing Towards Goal     Problem: Pain  Goal: *Control of Pain  Outcome: Progressing Towards Goal     Problem: Patient Education: Go to Patient Education Activity  Goal: Patient/Family Education  Outcome: Progressing Towards Goal     Problem: Patient Education: Go to Patient Education Activity  Goal: Patient/Family Education  Outcome: Progressing Towards Goal

## 2019-11-05 NOTE — DISCHARGE SUMMARY
[address] [facility] SUMMARY     Patient Name: Nereida Alvarez  Patient MRN: [id number]  Date of Admission: 10/31/2019  Date of Discharge: 11/5/2019   Length of Hospital Stay: 5    Pre-Procedure Diagnosis:   Cervical myelopathy with cervical radiculopathy secondary to multi-level cervical degenerative disc disease, cervical spinal stenosis and spondylosis   Post-Procedure Diagnosis: SAME AS ABOVE     Admitting Attending: Mala Archibald. Danica Phan MD  Discharge Attending: SAME AS ABOVE    Past Medical History:   Diagnosis Date    Bleeds easily [facility])     Dentist states pt bled a lot during oral surgery    Cervical stenosis of spine     CT 2019- C3-C7    GERD (gastroesophageal reflux disease)     resolves with prn omeprazole- well controlled    History of kidney stones 2018    X1 with surgical intervention    History of shingles 2016    has residual outbreaks R eye    Kidney stone 09/2019    recent lithotripsy 10/4/2019    MSSA (methicillin susceptible Staphylococcus aureus) 10/23/2019    postive nasal swab    Neck pain      Past Surgical History:   Procedure Laterality Date    HX CATARACT REMOVAL Right     HX COLONOSCOPY      HX KNEE ARTHROSCOPY Bilateral early 2000    L and R knee scope    HX LAP CHOLECYSTECTOMY  2010    HX LITHOTRIPSY  11/2018,10/4/2019    HX TONSILLECTOMY      HX WISDOM TEETH EXTRACTION      NEUROLOGICAL PROCEDURE UNLISTED  1996    lumber - for herniated disc     Allergies   Allergen Reactions    Sulfa (Sulfonamide Antibiotics) Other (comments)     \" muscle cramps\"     Procedures Performed During Hospitalization:   10/31/2019  1. C3-C7 posterior cervical instrumented fixation (EDEL OASIS POSTERIOR SEGMENTAL INSTRUMENTATION):   2. C3 inferior laminectomy, C4, C5, and C6 complete laminectomies, C7 superior laminectomy   3. Continuous intraoperative neuro monitoring  4. Posterior onlay fusion and posterolateral fusion with locally harvested autograft and DBM  5.  Continue Intraoperative Neuromonitoring (SSEPs and MEPs)    Summary of Hospital Events:   Yadira Ruiz is a 62 y.o. male who was admitted to inpatient status in the hospital for routine post-operative convalescence. There he progressed as expected in the post-operative setting without complications. He has some sinus tachycardia that was monitored and evaluated by medicine which resolved and felt to be a stress response. He was also hypertensive on admission and started on norvasc in the post-operative setting. He had some left knee edema which has resolved and rheumatology was consulted for possible CPPD, his edema has resolved and rheumatology recommended outpatient follow-up. He was monitored on tele and had no chest pain or shortness of breath. He underwent a Duplex ultrasound of the left lower extremity to rule out any DVT even though clinical suspicion was low. The Duplex ultrasound was negative for DVT. On the day of discharge his pain was well controlled, he was ambulating without difficulty, and he was amenable to being discharged home. Physical Exam:   Visit Vitals  /76 (BP 1 Location: Right arm, BP Patient Position: At rest)   Pulse 99   Temp 99 °F (37.2 °C)   Resp 18   Ht 5' 9\" (1.753 m)   Wt 180 lb 6.4 oz (81.8 kg)   SpO2 95%   BMI 26.64 kg/m²     Patient is in no acute distress  Patient is awake, alert, and oriented.    Patient skin is warm and dry  No mid-line cervical, thoracic, or lumbar tenderness to palpation   Patient with 5/5 strength in the bilateral upper and bilateral lower extremities with the exception of 4+/5 right  strength   Incision: is clean, dry, and intact with staples on the skin   Well-fitting cervical collar   The knee is non-tender to palpation, negative Campos's sign    Follow-Up: Wound check with Nurse Pastor Murrieta at [address] [place] [address] and [facility]    Current Discharge Medication List      START taking these medications    Details   amLODIPine (NORVASC) 10 mg tablet Take 1 Tab by mouth daily for 30 days. Indications: high blood pressure  Qty: 30 Tab, Refills: 0      cyclobenzaprine (FLEXERIL) 10 mg tablet Take 1 Tab by mouth three (3) times daily as needed for Muscle Spasm(s) for up to 15 days. Qty: 45 Tab, Refills: 0    Associated Diagnoses: Cervical myelopathy ([address] Cervical spinal stenosis; DDD (degenerative disc disease), cervical      HYDROcodone-acetaminophen (NORCO)  mg tablet Take 1 Tab by mouth every four (4) hours as needed (Moderate to severe pain) for up to 5 days. Max Daily Amount: 6 Tabs. Qty: 20 Tab, Refills: 0    Associated Diagnoses: Cervical myelopathy ([address] Cervical spinal stenosis; DDD (degenerative disc disease), cervical         CONTINUE these medications which have NOT CHANGED    Details   acetaminophen (TYLENOL EXTRA STRENGTH) 500 mg tablet Take 1,000 mg by mouth every eight (8) hours as needed for Pain. omeprazole (PRILOSEC) 40 mg capsule Take 1 Cap by mouth daily as needed. Refills: 2      naproxen sodium (ALEVE) 220 mg cap Take  by mouth as needed. Hold for surgery- last dose 9/28/19      multivitamin (ONE A DAY) tablet Take 1 Tab by mouth daily. The patient has been advised to call or seek medical attention should they develop any new weakness, numbness, tingling, fevers, drainage from incision, redness of the wound, or any other concerns. Sheryl Bosworth.  [facility]

## 2019-11-05 NOTE — PROGRESS NOTES
Discharge instructions  all new medications,medication side effects sheet, follow up appointment and  prescriptions reviewed and explained to the patient. Patient verbalizes understanding of instructions. A copy of discharge instructions and prescriptions  have been given to patient. Opportunity for questions provided.       Patient to be discharged after U/S report is available

## 2019-11-05 NOTE — PROGRESS NOTES
Hospitalist Progress Note     Admit Date:  10/31/2019  5:20 AM   Name:  Narciso Wilkinson   Age:  62 y.o.  :  1960   MRN:  [id number]   PCP:  Veronica Sims MD  Treatment Team: Attending Provider: Nohemy Barnett MD; Consulting Provider: Parris Salinas MD; Consulting Provider: Diane Nolasco MD; Care Manager: Michael Donaldson [facility]; Consulting Provider: Kasandra Siu MD; Staff Nurse: Anat Recio; Physical Therapy Assistant: Lloyd Obregon PTA    HPI:   CC: Cervical myelopathy with cervical radiculopathy    Pt is a 57-year-old male with past medical history significant for cervical myelopathy, hypertension, GERD. Patient was admitted by neurosurgery and underwent C3-7 laminectomy/fusion. Patient had a hypertension and was started on Norvasc. Patient has had swelling and pain in his left knee, rheumatology consult placed as thought possible CPPD. Subjective:     Exam patient eating breakfast, c-collar in place. Patient reports that his feeling in his right hand is back to where it was before he had problems. He is very pleased with his progress. Hospitalist service consulted re: tachycardia. Patient on remote telemetry. Patient denies any episodes of chest pain or shortness of breath, no lower extremity edema visible. Patient was noted with hypertension and started on Norvasc. Also received Colace and MiraLAX for constipation. Rheumatology consult pending for left knee, to be done as an outpatient. Patient DC to home per neurosurgery.     Objective:     Patient Vitals for the past 24 hrs:   Temp Pulse Resp BP SpO2   19 0725 99 °F (37.2 °C) 99 18 136/76 95 %   19 0442 98.3 °F (36.8 °C) 97 18 138/83 97 %   19 2356 98.6 °F (37 °C) 93 18 151/84 95 %   19 1955 98.2 °F (36.8 °C) (!) 111 18 129/85 95 %   19 1600 98 °F (36.7 °C) (!) 107 18 138/82 95 %   19 1337  (!) 122      19 1147 97.3 °F (36.3 °C) (!) 120 18 126/76 96 %     Oxygen Therapy  O2 Sat (%): 95 % (11/05/19 0725)  Pulse via Oximetry: 83 beats per minute (10/31/19 1457)  O2 Device: Room air (11/01/19 0733)  O2 Flow Rate (L/min): 4 l/min (10/31/19 1443)    Intake/Output Summary (Last 24 hours) at 11/5/2019 0855  Last data filed at 11/5/2019 0637  Gross per 24 hour   Intake    Output 525 ml   Net -525 ml         REVIEW OF SYSTEMS: Comprehensive ROS performed and negative except as stated in HPI. Physical Examination:  General:    Well nourished. Awake and alert. Head:  Normocephalic, atraumatic  Eyes:  Extraocular movements intact, normal sclera  CV:   RRR. No  Murmurs, clicks, or gallops  Lungs:   Unlabored, no cyanosis  Abdomen:   Soft, nondistended, nontender. Extremities: Warm and dry. No cyanosis LLE edema as noted in HPI  Skin:     No rashes or jaundice. Neuro:  No gross focal deficits  Psych:  Mood and affect appropriate    Data Review:  I have reviewed all labs, meds, telemetry events, and studies from the last 24 hours.     Recent Results (from the past 24 hour(s))   EKG, 12 LEAD, INITIAL    Collection Time: 11/04/19  2:00 PM   Result Value Ref Range    Ventricular Rate 113 BPM    Atrial Rate 113 BPM    P-R Interval 158 ms    QRS Duration 76 ms    Q-T Interval 306 ms    QTC Calculation (Bezet) 419 ms    Calculated P Axis 60 degrees    Calculated R Axis 31 degrees    Calculated T Axis 77 degrees    Diagnosis       Sinus tachycardia  Possible Left atrial enlargement  Nonspecific T wave abnormality  Abnormal ECG  No previous ECGs available  Confirmed by Huntley Bosworth MD (), MELVIN FALCON ([id number]) on 11/4/2019 2:05:41 PM          All Micro Results     None          Current Meds:  Current Facility-Administered Medications   Medication Dose Route Frequency    docusate sodium (COLACE) capsule 100 mg  100 mg Oral BID    polyethylene glycol (MIRALAX) packet 17 g  17 g Oral DAILY    amLODIPine (NORVASC) tablet 10 mg  10 mg Oral DAILY    lidocaine (XYLOCAINE) 10 mg/mL (1 %) injection 0.1 mL  0.1 mL SubCUTAneous PRN    alcohol 62% (NOZIN) nasal  1 Ampule  1 Ampule Topical Q12H    sodium chloride (NS) flush 5-40 mL  5-40 mL IntraVENous Q8H    sodium chloride (NS) flush 5-40 mL  5-40 mL IntraVENous PRN    acetaminophen (TYLENOL) tablet 650 mg  650 mg Oral Q4H PRN    HYDROcodone-acetaminophen (NORCO)  mg tablet 1 Tab  1 Tab Oral Q4H PRN    HYDROmorphone (PF) (DILAUDID) injection 1 mg  1 mg IntraVENous Q4H PRN    ondansetron (ZOFRAN) injection 4 mg  4 mg IntraVENous Q4H PRN    phenol throat spray (CHLORASEPTIC) 1 Spray  1 Spray Oral PRN    cyclobenzaprine (FLEXERIL) tablet 10 mg  10 mg Oral TID PRN    labetalol (NORMODYNE;TRANDATE) injection 10 mg  10 mg IntraVENous Q4H PRN       Diet:  DIET REGULAR    Other Studies (last 24 hours):  No results found. Assessment and Plan:     Hospital Problems as of 11/5/2019 Date Reviewed: 10/31/2019          Codes Class Noted - Resolved POA    Essential hypertension ICD-10-CM: I10  ICD-9-CM: 401.9  11/1/2019 - Present Unknown        GERD (gastroesophageal reflux disease) ICD-10-CM: K21.9  ICD-9-CM: 530.81  11/1/2019 - Present Unknown        * (Principal) [facility] ICD-10-CM: G95.9  ICD-9-CM: 721.1  10/31/2019 - Present Unknown              A/P:    Sinus tachycardia:   -EKG personally reviewed as sinus tachycardia,   -Remote tele monitoring    HTN:   -continued norvasc     Constipation:   -Add colace/ miralax     Left knee edema:   -pending rheum consult     DC planning/Dispo: Home    Code status: Full code  Medical decision maker:  Self    Case reviewed with supervising physician - ИВАН Hollins MD    Signed:  TONY Siegel

## 2019-11-07 ENCOUNTER — HOME CARE VISIT (OUTPATIENT)
Dept: SCHEDULING | Facility: HOME HEALTH | Age: 59
End: 2019-11-07
Payer: COMMERCIAL

## 2019-11-07 PROCEDURE — 400013 HH SOC

## 2019-11-07 PROCEDURE — G0151 HHCP-SERV OF PT,EA 15 MIN: HCPCS

## 2019-11-08 VITALS
HEART RATE: 75 BPM | DIASTOLIC BLOOD PRESSURE: 80 MMHG | RESPIRATION RATE: 18 BRPM | TEMPERATURE: 97.6 F | SYSTOLIC BLOOD PRESSURE: 124 MMHG

## 2019-11-11 ENCOUNTER — HOME CARE VISIT (OUTPATIENT)
Dept: SCHEDULING | Facility: HOME HEALTH | Age: 59
End: 2019-11-11
Payer: COMMERCIAL

## 2019-11-11 VITALS
DIASTOLIC BLOOD PRESSURE: 74 MMHG | SYSTOLIC BLOOD PRESSURE: 136 MMHG | RESPIRATION RATE: 14 BRPM | HEART RATE: 100 BPM | TEMPERATURE: 98.7 F

## 2019-11-11 PROCEDURE — G0157 HHC PT ASSISTANT EA 15: HCPCS

## 2019-11-14 ENCOUNTER — HOME CARE VISIT (OUTPATIENT)
Dept: SCHEDULING | Facility: HOME HEALTH | Age: 59
End: 2019-11-14
Payer: COMMERCIAL

## 2019-11-14 VITALS
RESPIRATION RATE: 16 BRPM | HEART RATE: 104 BPM | TEMPERATURE: 98.1 F | SYSTOLIC BLOOD PRESSURE: 108 MMHG | DIASTOLIC BLOOD PRESSURE: 80 MMHG

## 2019-11-14 PROCEDURE — G0157 HHC PT ASSISTANT EA 15: HCPCS

## 2019-11-19 ENCOUNTER — HOME CARE VISIT (OUTPATIENT)
Dept: SCHEDULING | Facility: HOME HEALTH | Age: 59
End: 2019-11-19
Payer: COMMERCIAL

## 2019-11-19 VITALS
SYSTOLIC BLOOD PRESSURE: 124 MMHG | HEART RATE: 80 BPM | DIASTOLIC BLOOD PRESSURE: 78 MMHG | RESPIRATION RATE: 17 BRPM | TEMPERATURE: 98.4 F

## 2019-11-19 PROCEDURE — G0157 HHC PT ASSISTANT EA 15: HCPCS

## 2019-11-21 ENCOUNTER — HOME CARE VISIT (OUTPATIENT)
Dept: SCHEDULING | Facility: HOME HEALTH | Age: 59
End: 2019-11-21
Payer: COMMERCIAL

## 2019-11-21 VITALS
RESPIRATION RATE: 16 BRPM | DIASTOLIC BLOOD PRESSURE: 68 MMHG | SYSTOLIC BLOOD PRESSURE: 150 MMHG | HEART RATE: 88 BPM | TEMPERATURE: 98.3 F

## 2019-11-21 PROCEDURE — G0157 HHC PT ASSISTANT EA 15: HCPCS

## 2019-11-26 ENCOUNTER — HOME CARE VISIT (OUTPATIENT)
Dept: SCHEDULING | Facility: HOME HEALTH | Age: 59
End: 2019-11-26
Payer: COMMERCIAL

## 2019-11-26 VITALS
HEART RATE: 92 BPM | SYSTOLIC BLOOD PRESSURE: 150 MMHG | RESPIRATION RATE: 19 BRPM | DIASTOLIC BLOOD PRESSURE: 84 MMHG | TEMPERATURE: 97.9 F

## 2019-11-26 PROCEDURE — G0157 HHC PT ASSISTANT EA 15: HCPCS

## 2019-11-27 ENCOUNTER — HOME CARE VISIT (OUTPATIENT)
Dept: SCHEDULING | Facility: HOME HEALTH | Age: 59
End: 2019-11-27
Payer: COMMERCIAL

## 2019-11-27 VITALS
TEMPERATURE: 97.8 F | SYSTOLIC BLOOD PRESSURE: 140 MMHG | HEART RATE: 92 BPM | DIASTOLIC BLOOD PRESSURE: 68 MMHG | RESPIRATION RATE: 18 BRPM

## 2019-11-27 PROCEDURE — G0157 HHC PT ASSISTANT EA 15: HCPCS

## 2019-12-02 ENCOUNTER — HOME CARE VISIT (OUTPATIENT)
Dept: SCHEDULING | Facility: HOME HEALTH | Age: 59
End: 2019-12-02
Payer: COMMERCIAL

## 2019-12-02 VITALS
SYSTOLIC BLOOD PRESSURE: 142 MMHG | TEMPERATURE: 98.2 F | RESPIRATION RATE: 18 BRPM | HEART RATE: 78 BPM | DIASTOLIC BLOOD PRESSURE: 84 MMHG

## 2019-12-02 PROCEDURE — G0151 HHCP-SERV OF PT,EA 15 MIN: HCPCS

## 2019-12-09 ENCOUNTER — HOSPITAL ENCOUNTER (OUTPATIENT)
Dept: GENERAL RADIOLOGY | Age: 59
Discharge: HOME | End: 2019-12-09
Payer: COMMERCIAL

## 2019-12-09 DIAGNOSIS — G95.9 CERVICAL MYELOPATHY (HCC): ICD-10-CM

## 2019-12-09 PROCEDURE — 72040 X-RAY EXAM NECK SPINE 2-3 VW: CPT

## 2020-01-08 ENCOUNTER — HOSPITAL ENCOUNTER (OUTPATIENT)
Dept: GENERAL RADIOLOGY | Age: 60
Discharge: HOME | End: 2020-01-08
Payer: COMMERCIAL

## 2020-01-08 DIAGNOSIS — Z98.890 S/P LAMINECTOMY: ICD-10-CM

## 2020-01-08 PROCEDURE — 72040 X-RAY EXAM NECK SPINE 2-3 VW: CPT

## 2020-01-14 ENCOUNTER — HOSPITAL ENCOUNTER (OUTPATIENT)
Dept: PHYSICAL THERAPY | Age: 60
Discharge: HOME | End: 2020-01-14
Attending: NEUROLOGICAL SURGERY
Payer: COMMERCIAL

## 2020-01-14 ENCOUNTER — APPOINTMENT (OUTPATIENT)
Dept: PHYSICAL THERAPY | Age: 60
End: 2020-01-14
Attending: NEUROLOGICAL SURGERY

## 2020-01-14 PROCEDURE — 97162 PT EVAL MOD COMPLEX 30 MIN: CPT

## 2020-01-14 NOTE — THERAPY EVALUATION
Tiffanie Devi  : 1960  Primary: [facility]*  Secondary:  Therapy Center at [facility], [address], [address]  [phone]   [phone]        OUTPATIENT PHYSICAL THERAPY:Initial Assessment 2020   ICD-10: Treatment Diagnosis: Cervicalgia (M54.2)   Pain in right shoulder (M25.511)    Precautions/Allergies:   Sulfa (sulfonamide antibiotics)   TREATMENT PLAN:  Effective Dates/Frequency/Duration: Twice per week from 2020 until 3/29/2020 (10 weeks). MEDICAL/REFERRING DIAGNOSIS:  S/P laminectomy [Z98.890]   DATE OF ONSET: 10/31/2019  REFERRING PHYSICIAN: Zina Carvalho MD MD Orders: Evaluate and treat  Return MD Appointment: unspecified     INITIAL ASSESSMENT:  Tiffanie Devi is a 62 y.o. male who presents to physical therapy for neck pain and stiffness following laminectomy on 10/31/2019 (details in history below). This session, pt demonstrated decreased B UE strength/endurance (R UE weaker), decreased cervical and thoracic mobility with associated pain, multiple postural deficits and decreased functional mobility as evident by a score of 17/50 on the Neck Disability Index (with higher scores indicating increased disability) and 28/55 on the Disabilities of the Arm, Shoulder, and Hand Questionnaire (with higher scores indicating increased disability). Pt may benefit from skilled PT to address the above listed deficits to improve ability to perform pain-free ADLs/IADLs and to improve overall quality of life prior to discharge. PROBLEM LIST (Impacting functional limitations):  1. Decreased Strength  2. Decreased ADL/Functional Activities  3. Increased Pain  4. Decreased Activity Tolerance  5. Decreased Work Simplification/Energy Conservation Techniques  6. Decreased Flexibility/Joint Mobility  7. Edema/Girth INTERVENTIONS PLANNED: (Treatment may consist of any combination of the following)  1.  Bed Mobility  2. Cold  3. Electrical Stimulation  4. Heat  5. Home Exercise Program (HEP)  6. Manual Therapy  7. Neuromuscular Re-education/Strengthening  8. Range of Motion (ROM)  9. Therapeutic Activites  10. Therapeutic Exercise/Strengthening  11. Transcutaneous Electrical Nerve Stimulation (TENS)  12. Ultrasound (US)     GOALS: (Goals have been discussed and agreed upon with patient.)  Short-Term Functional Goals: Time Frame: 5 weeks  1. Pt will be compliant with HEP in order to increase cervical mobility and UE strength/endurance/mobility to improve functional mobility and overall quality of life. 2. Pt will improve score on the Disabilities of the Arm, Shoulder, and Hand (DASH) Questionnaire to 20/55 in order to improve independence with ADLs and IADLs and to improve overall quality of life. 3. Pt will reduce score on Neck Disability Index to 14/50 in order to demonstrate improved function during activities of daily living. 4. Pt will demonstrate increase in cervical rotation AROM to 60% or more of normal with minimal to no increase in pain in order to improve functional mobility and look over his shoulder while driving. 5. Pt will be able to demonstrate good body mechanics while lifting 3 lb object up from the floor in order to minimize pain while taking groceries inside his house. Discharge Goals: Time Frame: 10 weeks  1. Pt will be independent with HEP in order to increase cervical mobility and UE strength/endurance/mobility to improve functional mobility and overall quality of life. 2. Pt will improve score on the Disabilities of the Arm, Shoulder, and Hand (DASH) Questionnaire to 13/55 in order to improve independence with ADLs and IADLs and to improve overall quality of life. 3. Pt will reduce score on Neck Disability Index to 11/50 in order to demonstrate improved function during activities of daily living.   4. Pt will demonstrate increase in cervical rotation AROM to 70% or more of normal with minimal to no increase in pain in order to improve functional mobility and look over his shoulder while driving. 5. Pt will be able to demonstrate good body mechanics while lifting 5 lb object up from the floor in order to minimize pain while taking groceries inside his house. OUTCOME MEASURE:   Tool Used: Disabilities of the Arm, Shoulder and Hand (DASH) Questionnaire - Quick Version  Score:  Initial: 28/55  Most Recent: X/55 (Date: -- )   Interpretation of Score: The DASH is designed to measure the activities of daily living in person's with upper extremity dysfunction or pain. Each section is scored on a 1-5 scale, 5 representing the greatest disability. The scores of each section are added together for a total score of 55. Tool Used: Neck Disability Index (NDI)  Score:  Initial: 17/50  Most Recent: X/50 (Date: -- )   Interpretation of Score: The Neck Disability Index is a revised form of the Oswestry Low Back Pain Index and is designed to measure the activities of daily living in person's with neck pain. Each section is scored on a 0-5 scale, 5 representing the greatest disability. The scores of each section are added together for a total score of 50. UPDATED OBJECTIVE FINDINGS:     Initial assessment only today: see objective section below for details    FALL RISK:    Ambulatory/Rehab Services H2 Model Falls Risk Assessment   Risk Factors:       (1)  Gender [Male]       (1)  Any administered benzodiazepines Ability to Rise from Chair:       (0)  Ability to rise in a single movement   Falls Prevention Plan:       No modifications necessary   Total: (5 or greater = High Risk): 2   ©2010 [facility] of [employer]. All Rights Reserved. [facility] States Patent #0,[id number].  Federal Law prohibits the replication, distribution or use without written permission from [facility] of [address] [place] [address] Avenue:   · Patient is expected to demonstrate progress in strength, range of motion and functional technique to improve ability to perform pain-free ADLs/IADLs and to improve overall quality of life. REASON FOR SERVICES/OTHER COMMENTS:  · Patient continues to require modification of therapeutic interventions to increase complexity of exercises. Total Duration:  PT Patient Time In/Time Out  Time In: 1430  Time Out: 1515    Rehabilitation Potential For Stated Goals: Good  Regarding Claudell Ham Burton's therapy, I certify that the treatment plan above will be carried out by a therapist or under their direction. Thank you for this referral,  Chaz Rosenbaum DPT     Referring Physician Signature: Zina Carvalho MD _______________________________ Date _____________     PAIN/SUBJECTIVE:   Initial: 4/10 Post Session:  No change in pain reported   HISTORY:   History of Injury/Illness (Reason for Referral): *History per pt or pt's family except where otherwise noted  Pt states he had neck pain with referred pain down his R UE for about 4 years. States initially he went to [facility] and initially they thought it was problems in his arm. States he went to therapy and the therapist found the pain was actually coming from the neck. States he had an injection that helped for a little while, but then the pain returned and the pain started going down his R UE and it hurt constantly, and his hand was tingling and weak. States he went to see Dr. Smitha Yo, who told him he needed surgery. States he got below neck surgery on 10/31/2019. States he wore neck brace for about 2 months, but was able to take it off on 1/8/2020 (still has it just in case). 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 Pain at worst is 4/10 (aggravating activities include moving his arms more). Pain at best is 2/10 (eases pain by resting with neck supported).   Surgery: \"C3-C7 posterior cervical instrumented fixation with C3-C7 laminectomy for decompression for treatment of cervical myelopathy with cervical radiculopathy secondary to multilevel cervical degenerative disc disease, cervical spinal stenosis and spondylosis performed on 10/31/2019. He has AP and lateral x-ray cervical spine imaging that demonstrates a C3-C7 posterior lateral mass screw fixation without evidence of pseudoarthrosis or hardware failure. \"  Pt's MD has told him that he has no restrictions except lifting, so he can do whatever he can tolerate right now.     Past Medical History/Comorbidities:   Mr. Amelie Fernandez  has a past medical history of Bleeds easily [facility]), Cervical stenosis of spine, GERD (gastroesophageal reflux disease), History of kidney stones (2018), History of shingles (2016), Kidney stone (09/2019), MSSA (methicillin susceptible Staphylococcus aureus) (10/23/2019), and Neck pain. Mr. Amelie Fernandez  has a past surgical history that includes hx knee arthroscopy (Bilateral, early 2000); hx tonsillectomy; hx cataract removal (Right); hx lap cholecystectomy (2010); hx lithotripsy (11/2018,10/4/2019); hx colonoscopy; hx wisdom teeth extraction; neurological procedure unlisted (1996); and hx cervical fusion (10/31/2019). 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 Current Medications:       Current Outpatient Medications:     tiZANidine (ZANAFLEX) 4 mg tablet, Take 1 Tab by mouth three (3) times daily as needed (post op cervical muscle spasms).  Indications: muscle spasm, Disp: 30 Tab, Rfl: 0    tamsulosin (FLOMAX) 0.4 mg capsule, Take 1 Cap by mouth daily. , Disp: 30 Cap, Rfl: 11    acetaminophen (TYLENOL EXTRA STRENGTH) 500 mg tablet, Take 1,000 mg by mouth every eight (8) hours as needed for Pain., Disp: , Rfl:     omeprazole (PRILOSEC) 40 mg capsule, Take 1 Cap by mouth daily as needed (heartburn). , Disp: , Rfl: 2    naproxen sodium (ALEVE) 220 mg cap, Take  by mouth as needed. Hold for surgery- last dose 9/28/19, Disp: , Rfl:     multivitamin (ONE A DAY) tablet, Take 1 Tab by mouth daily. , Disp: , Rfl:    Amlodipine  Tramadol (as needed)   Date Last Reviewed:  1/14/2020    Number of Personal Factors/Comorbidities that affect the Plan of Care: 1-2: MODERATE COMPLEXITY   EXAMINATION:   Initial assessment on 1/14/2020   Observation/Orthostatic Postural Assessment:    The following postural deficits were noted in sitting: loss of cervical lordosis, loss of thoracic kyphosis, forward head, rounded shoulders and posterior pelvic tilt   The following postural deficits were noted in standing: slight forward trunk flexion   Palpation:          Tightness/tenderness throughout B upper traps/deltoids, levator scapulae, cervical/thoracic paraspinals, rhomboids/middle traps  ROM:    Initial measurement: (on 1/14/2020) Initial measurement: (on 1/14/2020) Reassessment measurement:  Reassessment measurement:      WFL and non-painful throughout L shoulder, elbow, and hand WFL throughout R UE, but painful at end-range with following motions: shoulder abduction, shoulder ER/IR         Initial measurement: (on 1/14/2020) Reassessment measurement: Reassessment measurement:      Cervical AROM  Flexion (% of normal): 100%  Extension (% of normal): 30% (pulling in L side of neck)  L sidebending (% of normal): 40% (pulling in R upper traps)  R sidebending (% of normal): 50% (pulling in L upper traps)  L rotation (% of normal): 30% (pulling in L levator scapulae)  R rotation (% of normal): 30% (pulling in L levator scapulae)    Thoracic AROM  Flexion (% of normal): 100%  Extension (% of normal): Able to get slightly past neutral (slight pain in posterior neck)  L sidebending (% of normal): 80%  R sidebending (% of normal): 60%  L rotation (% of normal): 95%  R rotation (% of normal): 90%            Strength:    Initial measurement: (on 1/14/2020) Initial measurement: (on 1/14/2020)  Reassessment Reassessment    L UE: R UE:     Shoulder flexion  4/5  4-/5 *     Shoulder extension 5/5  4+/5      Shoulder abduction 4+/5  4-/5      Shoulder adduction 5/5  4/5 (pain in anterior shoulder)     Shoulder IR  5/5  4+/5      Shoulder ER 5/5  4-/5  (pain in posterior shoulder)     Elbow flexion 5/5  5/5      Elbow extension 5/5  5/5      Wrist flexion  5/5  5/5      Wrist extension 5/5  5/5       *pain in neck  Special Tests:          -  Neurological Screen:        Myotomes: WNL        Dermatomes:  Decreased on R at C5, C8, and T1    Body Structures Involved:  1. Nerves  2. Bones  3. Joints  4. Muscles  5. Ligaments Body Functions Affected:  1. Sensory/Pain  2. Neuromusculoskeletal  3. Movement Related Activities and Participation Affected:  1. General Tasks and Demands  2. Mobility  3. Self Care  4. Domestic Life  5. Interpersonal Interactions and Relationships  6.  Community, Social and [place]   Number of elements (examined above) that affect the Plan of Care: 4+: HIGH COMPLEXITY   CLINICAL PRESENTATION:   Presentation: Evolving clinical presentation with changing clinical characteristics: MODERATE COMPLEXITY   CLINICAL DECISION MAKING:   Use of outcome tool(s) and clinical judgement create a POC that gives a: Questionable prediction of patient's progress: MODERATE COMPLEXITY

## 2020-01-15 NOTE — PROGRESS NOTES
Mckenna Garsia  : 1960  Primary: [facility]*  Secondary:  Therapy Center at [facility], [address] [facility], [address]  [phone]   [phone]       OUTPATIENT PHYSICAL THERAPY: Daily Treatment Note 2020  Visit Count:  1  ICD-10: Treatment Diagnosis: Cervicalgia (M54.2)   Pain in right shoulder (M25.511)    Precautions/Allergies:   Sulfa (sulfonamide antibiotics)   TREATMENT PLAN:  Effective Dates/Frequency/Duration: Twice per week from 2020 until 3/29/2020 (10 weeks). Pre-treatment Symptoms/Complaints:  See history above. Pain: Initial:   4/10 Post Session:  No change in pain reported   Medications Last Reviewed:  2020  Updated Objective Findings: See evaluation note from today   TREATMENT:   Assessment only today, no treatment provided. Treatment/Session Summary:    · Response to Treatment:  See assessment above. No adverse reactions/unusual changes observed/reported in clinical status this session. · Communication/Consultation:  None today  · Equipment provided today:  None today  · Recommendations/Intent for next treatment session: Next visit will focus on increasing ROM in neck/midback; stretching, manual therapy/modalities as needed to reduce pain; work on gentle strengthening exercises for cervical and shoulder region.     Total Treatment Billable Duration:  0 minutes  PT Patient Time In/Time Out  Time In: 1430  Time Out: [address] [facility], DPT    Future Appointments   Date Time Provider Khushboo Dexter   [facility]   2020  3:45 PM MD Anni Wakefield   10/29/2020  8:15 AM Milton Peace NP [facility]

## 2020-01-17 ENCOUNTER — HOSPITAL ENCOUNTER (OUTPATIENT)
Dept: PHYSICAL THERAPY | Age: 60
Discharge: HOME | End: 2020-01-17
Attending: NEUROLOGICAL SURGERY
Payer: COMMERCIAL

## 2020-01-17 PROCEDURE — 97140 MANUAL THERAPY 1/> REGIONS: CPT

## 2020-01-17 PROCEDURE — 97110 THERAPEUTIC EXERCISES: CPT

## 2020-01-17 NOTE — PROGRESS NOTES
Alanlarry Lesch  : 1960  Primary: [facility]*  Secondary:  [facility], [address] [facility], [place] [address] [facility]  [phone]   [phone]       OUTPATIENT PHYSICAL THERAPY: Daily Treatment Note 2020  Visit Count:  2  ICD-10: Treatment Diagnosis: Cervicalgia (M54.2)   Pain in right shoulder (M25.511)    Precautions/Allergies:   Sulfa (sulfonamide antibiotics)   TREATMENT PLAN:  Effective Dates/Frequency/Duration: Twice per week from 2020 until 3/29/2020 (10 weeks). Pre-treatment Symptoms/Complaints:  Patient reports he has been vacuuming today. He notes is pain to be 5/10. Patient reports he went back to work half days this week. Pain: Initial:   5/10 Post Session:  2/10    Medications Last Reviewed:  2020  Updated Objective Findings: None Today   TREATMENT:     THERAPEUTIC EXERCISE: (10 minutes):  Exercises per grid below to improve mobility, strength and balance. Required verbal cues for technique and exercise. Date:  2020 Date:   Date:     Activity/Exercise Parameters Parameters Parameters   UBE  Level 1  3/3 - 6 minutes     isometrics In flex, ext, side bending to L and R  10 x 3 sec hold                                        MANUAL THERAPY: (30 minutes): Soft tissue mobilization was utilized and necessary because of the patient's restricted joint motion, painful spasm and restricted motion of soft tissue. Patient supine with wedge for sub-occipital work and manual stretching to cervical region. Patient then positioned in sitting with arms propped for thera-roller to B upper traps to decrease tightness and pain. More trigger points noted on R than L. MODALITIES: (10 minutes): *  Hot Pack Therapy in order to provide analgesia and relieve muscle spasm. Patient in sitting with arms propped for moist heat to B upper traps for 10 minutes to decrease pain and increase mobility.   Skin was clear and intact. Treatment/Session Summary:    · Response to Treatment:  Patient with increased cervical mobility and decreased upper trap pain. Instructed patient in gentle isometrics to start for HEP. No adverse reactions/unusual changes observed/reported in clinical status this session. · Communication/Consultation:  None today  · Equipment provided today:  None today  · Recommendations/Intent for next treatment session: Next visit will focus on increasing ROM in neck/midback; stretching, manual therapy/modalities as needed to reduce pain; work on gentle strengthening exercises for cervical and shoulder region.     Total Treatment Billable Duration:  40 minutes  PT Patient Time In/Time Out  Time In: 4218  Time Out: [place] [address], PTA    Future Appointments   Date Time Provider Khushboo Dexter   1/21/2020  2:30 PM Paco Schultz DPT [facility]   1/24/2020  4:00 PM Nona Bernal PTA [facility]   4/8/2020  3:45 PM MD Carmen Hughesllyntie 27   10/29/2020  8:15 AM Riya Carlson NP [facility]

## 2020-01-21 ENCOUNTER — HOSPITAL ENCOUNTER (OUTPATIENT)
Dept: PHYSICAL THERAPY | Age: 60
Discharge: HOME | End: 2020-01-21
Attending: NEUROLOGICAL SURGERY
Payer: COMMERCIAL

## 2020-01-21 PROCEDURE — 97140 MANUAL THERAPY 1/> REGIONS: CPT

## 2020-01-21 PROCEDURE — 97110 THERAPEUTIC EXERCISES: CPT

## 2020-01-21 NOTE — PROGRESS NOTES
Jayesh Carey  : 1960  Primary: [facility]  Secondary:  [facility], [facility], [address]  [phone]   [phone]       OUTPATIENT PHYSICAL THERAPY: Daily Treatment Note 2020  Visit Count:  3  ICD-10: Treatment Diagnosis: Cervicalgia (M54.2)   Pain in right shoulder (M25.511)    Precautions/Allergies:   Sulfa (sulfonamide antibiotics)   TREATMENT PLAN:  Effective Dates/Frequency/Duration: Twice per week from 2020 until 3/29/2020 (10 weeks). Pre-treatment Symptoms/Complaints:  Patient states he was fine after last session. States he started having some pain in L midback/scapular region on Saturday, and he had some muscle spasming that reduced with medication; states he went back to work last week  Pain: Initial:   3/10 Post Session:  3/10    Medications Last Reviewed:  2020  Updated Objective Findings: None Today   TREATMENT:     THERAPEUTIC EXERCISE: (12 minutes):  Exercises per grid below to improve mobility, strength and balance. Required verbal cues for technique and exercise. Date:  2020 Date:  2020 Date:     Activity/Exercise Parameters Parameters Parameters   UBE  Level 1  3/3 - 6 minutes Level 2 resistance; 3 minutes forward and 3 minutes backward    isometrics In flex, ext, side bending to L and R  10 x 3 sec hold  For flexion, sidebending to L/R, and extension; 3-4 reps each direction with 2-3 second hold; cues for gentler movement    Supine chin tuck  10 reps/1 set with 2-3 second hold; therapist cuing with hands initially and then having pt press into pillow                                MANUAL THERAPY: (28 minutes): With pt in supported supine position, STM and trigger point release to R>L upper traps/deltoids, levator scapulae, cervical paraspinals, and rhomboids/middle traps to reduce muscular tightness and pain.  With pt in supported supine position, gentle inferior 1st rib mobilizations at R (grade 2-3) to reduce pain and improve cervical mobility. With pt in supported supine position, gentle passive mobilizations into L/R cervical sidebending, L/R cervical rotation, and cervical flexion as well as intermittent cervical distraction mobilizations to improve cervical mobility and reduce muscular tightness and pain. MODALITIES: (10 minutes): *  Hot Pack Therapy in order to provide analgesia and relieve muscle spasm. Patient in supported supine position for moist heat to B upper traps and cervical region for 10 minutes to decrease pain and increase mobility. Skin was clear and intact. Treatment/Session Summary:    · Response to Treatment:  Patient with significant tightness/tenderness in R>L upper traps/deltoids this session that reduced slightly with manual therapy. However, he continues to be very limited in movement into all cervical planes of motion. No adverse reactions/unusual changes observed/reported in clinical status this session. · Communication/Consultation:  None today  · Equipment provided today:  None today  · Recommendations/Intent for next treatment session: Next visit will focus on increasing ROM in neck/midback; stretching, manual therapy/modalities as needed to reduce pain; work on gentle strengthening exercises for cervical and shoulder region.     Total Treatment Billable Duration:  40 minutes  PT Patient Time In/Time Out  Time In: 1430  Time Out: [address], DPT    Future Appointments   Date Time Provider Khushboo Dexter   1/24/2020  7:00 PM Argentina Small [facility]   1/28/2020  8:45 AM Klaudia Forrester DPT [facility]   1/30/2020 10:15 AM Klaudia Forrester DPT [facility]   4/8/2020  3:45 PM MD Anni Bo 27   10/29/2020  8:15 AM Sandra Bronw NP [facility]

## 2020-01-24 ENCOUNTER — HOSPITAL ENCOUNTER (OUTPATIENT)
Dept: PHYSICAL THERAPY | Age: 60
Discharge: HOME | End: 2020-01-24
Attending: NEUROLOGICAL SURGERY
Payer: COMMERCIAL

## 2020-01-24 NOTE — PROGRESS NOTES
Javid Roe  : 1960  Primary: [facility]*  Secondary:  [facility], [address] [facility], [address]  [phone]   [phone]      2020  Patient was scheduled for therapy today, but called in advance to cancel.   Jeffery Rater, PTA

## 2020-01-28 ENCOUNTER — HOSPITAL ENCOUNTER (OUTPATIENT)
Dept: PHYSICAL THERAPY | Age: 60
Discharge: HOME | End: 2020-01-28
Attending: NEUROLOGICAL SURGERY
Payer: COMMERCIAL

## 2020-01-28 NOTE — PROGRESS NOTES
[facility] at [facility]   [address], [address] [facility], [address]  [phone]   [phone]     OUTPATIENT DAILY NOTE    NAME: Ethel Sims    DATE: 1/28/2020    Patient canceled physical therapy appointment today. Will plan to follow up on next scheduled visit.     Kevin Lorenzo DPT    Future Appointments   Date Time Provider Khushboo Dexter   1/30/2020 10:15 AM Bhavik Frank DPT [facility]   4/8/2020  3:45 PM MD Fady Tysonulimyllyntie 27   10/29/2020  8:15 AM Esteban Cardoza NP [facility]

## 2020-01-30 ENCOUNTER — HOSPITAL ENCOUNTER (OUTPATIENT)
Dept: PHYSICAL THERAPY | Age: 60
Discharge: HOME | End: 2020-01-30
Attending: NEUROLOGICAL SURGERY
Payer: COMMERCIAL

## 2020-01-30 NOTE — PROGRESS NOTES
[facility] at [facility]   [address], [address] [facility], [address]  [phone]   [phone]     OUTPATIENT DAILY NOTE    NAME: Michelle Clark    DATE: 1/30/2020    Patient canceled physical therapy appointment today. Will plan to follow up on next scheduled visit.     Hilbert Goldberg, OLIVER    Future Appointments   Date Time Provider Khushboo Dexter   1/30/2020  7:00 PM [facility]   4/8/2020  3:45 PM MD Fady Bradshawulimylly[address]   10/29/2020  8:15 AM Maya Bowser NP [facility]

## 2020-06-01 ENCOUNTER — HOSPITAL ENCOUNTER (OUTPATIENT)
Dept: GENERAL RADIOLOGY | Age: 60
Discharge: HOME | End: 2020-06-01
Payer: COMMERCIAL

## 2020-06-01 DIAGNOSIS — Z98.890 S/P LAMINECTOMY: ICD-10-CM

## 2020-06-01 PROCEDURE — 72040 X-RAY EXAM NECK SPINE 2-3 VW: CPT

## 2020-06-02 NOTE — THERAPY DISCHARGE
Belinda Gil : 1960 Primary: [facility] [employer] [employer] Secondary:  [facility] at [facility], [address] [facility], [address] [phone]   [phone] OUTPATIENT PHYSICAL THERAPY:Initial Assessment 2020 ICD-10: Treatment Diagnosis: Cervicalgia (M54.2) Pain in right shoulder (M25.511) Precautions/Allergies:  
Sulfa (sulfonamide antibiotics) TREATMENT PLAN: 
Effective Dates/Frequency/Duration: Twice per week from 2020 until 3/29/2020 (10 weeks). MEDICAL/REFERRING DIAGNOSIS: 
Other specified postprocedural states [Z98.890] DATE OF ONSET: 10/31/2019 REFERRING PHYSICIAN: Reginald Brian MD MD Orders: Evaluate and treat Return MD Appointment: unspecified Belinda Gil has been seen in physical therapy from 2020 to 2020 for 3 visits. Treatment has been discontinued at this time due to patient noncompliant with attending/scheduling PT sessions. The below goals were not reassessed secondary to pt failing to attend additional sessions prior to discharge. Thank you for this referral.   
PROBLEM LIST (Impacting functional limitations): 1. Decreased Strength 2. Decreased ADL/Functional Activities 3. Increased Pain 4. Decreased Activity Tolerance 5. Decreased Work Simplification/Energy Conservation Techniques 6. Decreased Flexibility/Joint Mobility 7. Edema/Girth INTERVENTIONS PLANNED: (Treatment may consist of any combination of the following) 1. Bed Mobility 2. Cold 3. Electrical Stimulation 4. Heat 5. Home Exercise Program (HEP) 6. Manual Therapy 7. Neuromuscular Re-education/Strengthening 8. Range of Motion (ROM) 9. Therapeutic Activites 10. Therapeutic Exercise/Strengthening 11. Transcutaneous Electrical Nerve Stimulation (TENS) 12. Ultrasound (US)  
 
GOALS: (Goals have been discussed and agreed upon with patient.) Short-Term Functional Goals: Time Frame: 5 weeks 1. Pt will be compliant with HEP in order to increase cervical mobility and UE strength/endurance/mobility to improve functional mobility and overall quality of life. 2. Pt will improve score on the Disabilities of the Arm, Shoulder, and Hand (DASH) Questionnaire to 20/55 in order to improve independence with ADLs and IADLs and to improve overall quality of life. 3. Pt will reduce score on Neck Disability Index to 14/50 in order to demonstrate improved function during activities of daily living. 4. Pt will demonstrate increase in cervical rotation AROM to 60% or more of normal with minimal to no increase in pain in order to improve functional mobility and look over his shoulder while driving. 5. Pt will be able to demonstrate good body mechanics while lifting 3 lb object up from the floor in order to minimize pain while taking groceries inside his house. Discharge Goals: Time Frame: 10 weeks 1. Pt will be independent with HEP in order to increase cervical mobility and UE strength/endurance/mobility to improve functional mobility and overall quality of life. 2. Pt will improve score on the Disabilities of the Arm, Shoulder, and Hand (DASH) Questionnaire to 13/55 in order to improve independence with ADLs and IADLs and to improve overall quality of life. 3. Pt will reduce score on Neck Disability Index to 11/50 in order to demonstrate improved function during activities of daily living. 4. Pt will demonstrate increase in cervical rotation AROM to 70% or more of normal with minimal to no increase in pain in order to improve functional mobility and look over his shoulder while driving. 5. Pt will be able to demonstrate good body mechanics while lifting 5 lb object up from the floor in order to minimize pain while taking groceries inside his house. OUTCOME MEASURE:  
Tool Used: Disabilities of the Arm, Shoulder and Hand (DASH) Questionnaire - Quick Version Score:  Initial: 28/55  Most Recent: X/55 (Date: -- ) Interpretation of Score: The DASH is designed to measure the activities of daily living in person's with upper extremity dysfunction or pain. Each section is scored on a 1-5 scale, 5 representing the greatest disability. The scores of each section are added together for a total score of 55. Tool Used: Neck Disability Index (NDI) Score:  Initial: 17/50  Most Recent: X/50 (Date: -- ) Interpretation of Score: The Neck Disability Index is a revised form of the Oswestry Low Back Pain Index and is designed to measure the activities of daily living in person's with neck pain. Each section is scored on a 0-5 scale, 5 representing the greatest disability. The scores of each section are added together for a total score of 50. FALL RISK: 
 
Ambulatory/Rehab Services H2 Model Falls Risk Assessment Risk Factors: 
     (1)  Gender [Male] (1)  Any administered benzodiazepines Ability to Rise from Chair: 
     (0)  Ability to rise in a single movement Falls Prevention Plan: No modifications necessary Total: (5 or greater = High Risk): 2  
©2010 [facility] [place] . [facility] States Patent #[id number]. Federal Law prohibits the replication, distribution or use without written permission from [facility] [employer] Thank you for this referral, Chon Vera DPT Referring Physician Signature: Romario Spain MD No Signature is Required for this note.

## 2020-10-05 ENCOUNTER — HOSPITAL ENCOUNTER (OUTPATIENT)
Dept: GENERAL RADIOLOGY | Age: 60
Discharge: HOME | End: 2020-10-05
Payer: COMMERCIAL

## 2020-10-05 DIAGNOSIS — Z98.890 S/P LAMINECTOMY: ICD-10-CM

## 2020-10-05 DIAGNOSIS — Z98.1 STATUS POST CERVICAL SPINAL FUSION: ICD-10-CM

## 2020-10-05 DIAGNOSIS — G95.9 CERVICAL MYELOPATHY (HCC): ICD-10-CM

## 2020-10-05 PROCEDURE — 72040 X-RAY EXAM NECK SPINE 2-3 VW: CPT

## 2020-11-17 ENCOUNTER — HOSPITAL ENCOUNTER (OUTPATIENT)
Dept: CT IMAGING | Age: 60
Discharge: HOME | End: 2020-11-17
Attending: NURSE PRACTITIONER
Payer: COMMERCIAL

## 2020-11-17 DIAGNOSIS — N20.0 KIDNEY STONE: ICD-10-CM

## 2020-11-17 DIAGNOSIS — R31.29 MICROHEMATURIA: ICD-10-CM

## 2020-11-17 DIAGNOSIS — R10.9 LEFT FLANK PAIN: ICD-10-CM

## 2020-11-17 PROCEDURE — 74176 CT ABD & PELVIS W/O CONTRAST: CPT

## 2020-12-01 ENCOUNTER — HOSPITAL ENCOUNTER (OUTPATIENT)
Dept: LAB | Age: 60
Discharge: HOME | End: 2020-12-01
Payer: COMMERCIAL

## 2020-12-03 ENCOUNTER — ANESTHESIA EVENT (OUTPATIENT)
Dept: SURGERY | Age: 60
End: 2020-12-03
Payer: COMMERCIAL

## 2020-12-04 ENCOUNTER — HOSPITAL ENCOUNTER (OUTPATIENT)
Age: 60
Setting detail: OUTPATIENT SURGERY
Discharge: HOME | End: 2020-12-04
Attending: UROLOGY | Admitting: UROLOGY
Payer: COMMERCIAL

## 2020-12-04 ENCOUNTER — ANESTHESIA (OUTPATIENT)
Dept: SURGERY | Age: 60
End: 2020-12-04
Payer: COMMERCIAL

## 2020-12-04 VITALS
TEMPERATURE: 98 F | DIASTOLIC BLOOD PRESSURE: 91 MMHG | SYSTOLIC BLOOD PRESSURE: 145 MMHG | OXYGEN SATURATION: 99 % | RESPIRATION RATE: 16 BRPM | HEART RATE: 66 BPM

## 2020-12-04 DIAGNOSIS — N20.1 LEFT URETERAL STONE: Primary | ICD-10-CM

## 2020-12-04 PROCEDURE — 76010000138 HC OR TIME 0.5 TO 1 HR: Performed by: UROLOGY

## 2020-12-04 PROCEDURE — C2617 STENT, NON-COR, TEM W/O DEL: HCPCS | Performed by: UROLOGY

## 2020-12-04 PROCEDURE — 77030040361 HC SLV COMPR DVT MDII -B: Performed by: UROLOGY

## 2020-12-04 PROCEDURE — 76210000063 HC OR PH I REC FIRST 0.5 HR: Performed by: UROLOGY

## 2020-12-04 PROCEDURE — 74011250636 HC RX REV CODE- 250/636: Performed by: STUDENT IN AN ORGANIZED HEALTH CARE EDUCATION/TRAINING PROGRAM

## 2020-12-04 PROCEDURE — 2709999900 HC NON-CHARGEABLE SUPPLY: Performed by: UROLOGY

## 2020-12-04 PROCEDURE — 77030019927 HC TBNG IRR CYSTO BAXT -A: Performed by: UROLOGY

## 2020-12-04 PROCEDURE — C1758 CATHETER, URETERAL: HCPCS | Performed by: UROLOGY

## 2020-12-04 PROCEDURE — 77030010509 HC AIRWY LMA MSK TELE -A: Performed by: STUDENT IN AN ORGANIZED HEALTH CARE EDUCATION/TRAINING PROGRAM

## 2020-12-04 PROCEDURE — 76060000032 HC ANESTHESIA 0.5 TO 1 HR: Performed by: UROLOGY

## 2020-12-04 PROCEDURE — 74011000636 HC RX REV CODE- 636: Performed by: UROLOGY

## 2020-12-04 PROCEDURE — C1769 GUIDE WIRE: HCPCS | Performed by: UROLOGY

## 2020-12-04 PROCEDURE — 74011000250 HC RX REV CODE- 250: Performed by: NURSE ANESTHETIST, CERTIFIED REGISTERED

## 2020-12-04 PROCEDURE — 74011250636 HC RX REV CODE- 250/636: Performed by: NURSE ANESTHETIST, CERTIFIED REGISTERED

## 2020-12-04 PROCEDURE — 76210000020 HC REC RM PH II FIRST 0.5 HR: Performed by: UROLOGY

## 2020-12-04 DEVICE — URETERAL STENT
Type: IMPLANTABLE DEVICE | Site: URETER | Status: FUNCTIONAL
Brand: PERCUFLEX™ PLUS

## 2020-12-04 RX ORDER — PROPOFOL 10 MG/ML
INJECTION, EMULSION INTRAVENOUS AS NEEDED
Status: DISCONTINUED | OUTPATIENT
Start: 2020-12-04 | End: 2020-12-04 | Stop reason: HOSPADM

## 2020-12-04 RX ORDER — FENTANYL CITRATE 50 UG/ML
INJECTION, SOLUTION INTRAMUSCULAR; INTRAVENOUS AS NEEDED
Status: DISCONTINUED | OUTPATIENT
Start: 2020-12-04 | End: 2020-12-04 | Stop reason: HOSPADM

## 2020-12-04 RX ORDER — DIPHENHYDRAMINE HCL 25 MG
25 CAPSULE ORAL
COMMUNITY

## 2020-12-04 RX ORDER — FLUMAZENIL 0.1 MG/ML
0.2 INJECTION INTRAVENOUS
Status: DISCONTINUED | OUTPATIENT
Start: 2020-12-04 | End: 2020-12-04 | Stop reason: HOSPADM

## 2020-12-04 RX ORDER — SODIUM CHLORIDE 0.9 % (FLUSH) 0.9 %
5-40 SYRINGE (ML) INJECTION AS NEEDED
Status: DISCONTINUED | OUTPATIENT
Start: 2020-12-04 | End: 2020-12-04 | Stop reason: HOSPADM

## 2020-12-04 RX ORDER — HYDROMORPHONE HYDROCHLORIDE 2 MG/ML
0.5 INJECTION, SOLUTION INTRAMUSCULAR; INTRAVENOUS; SUBCUTANEOUS
Status: DISCONTINUED | OUTPATIENT
Start: 2020-12-04 | End: 2020-12-04 | Stop reason: HOSPADM

## 2020-12-04 RX ORDER — OXYCODONE HYDROCHLORIDE 5 MG/1
5 TABLET ORAL
Status: DISCONTINUED | OUTPATIENT
Start: 2020-12-04 | End: 2020-12-04 | Stop reason: HOSPADM

## 2020-12-04 RX ORDER — CEFAZOLIN SODIUM/WATER 2 G/20 ML
2 SYRINGE (ML) INTRAVENOUS ONCE
Status: DISCONTINUED | OUTPATIENT
Start: 2020-12-04 | End: 2020-12-04 | Stop reason: HOSPADM

## 2020-12-04 RX ORDER — SODIUM CHLORIDE, SODIUM LACTATE, POTASSIUM CHLORIDE, CALCIUM CHLORIDE 600; 310; 30; 20 MG/100ML; MG/100ML; MG/100ML; MG/100ML
INJECTION, SOLUTION INTRAVENOUS
Status: DISCONTINUED | OUTPATIENT
Start: 2020-12-04 | End: 2020-12-04 | Stop reason: HOSPADM

## 2020-12-04 RX ORDER — CEFAZOLIN SODIUM 1 G/3ML
INJECTION, POWDER, FOR SOLUTION INTRAMUSCULAR; INTRAVENOUS AS NEEDED
Status: DISCONTINUED | OUTPATIENT
Start: 2020-12-04 | End: 2020-12-04 | Stop reason: HOSPADM

## 2020-12-04 RX ORDER — DEXAMETHASONE SODIUM PHOSPHATE 4 MG/ML
INJECTION, SOLUTION INTRA-ARTICULAR; INTRALESIONAL; INTRAMUSCULAR; INTRAVENOUS; SOFT TISSUE AS NEEDED
Status: DISCONTINUED | OUTPATIENT
Start: 2020-12-04 | End: 2020-12-04 | Stop reason: HOSPADM

## 2020-12-04 RX ORDER — ONDANSETRON 2 MG/ML
INJECTION INTRAMUSCULAR; INTRAVENOUS AS NEEDED
Status: DISCONTINUED | OUTPATIENT
Start: 2020-12-04 | End: 2020-12-04 | Stop reason: HOSPADM

## 2020-12-04 RX ORDER — LIDOCAINE HYDROCHLORIDE 10 MG/ML
0.1 INJECTION INFILTRATION; PERINEURAL AS NEEDED
Status: DISCONTINUED | OUTPATIENT
Start: 2020-12-04 | End: 2020-12-04 | Stop reason: HOSPADM

## 2020-12-04 RX ORDER — ZOLPIDEM TARTRATE 5 MG/1
TABLET ORAL
COMMUNITY

## 2020-12-04 RX ORDER — PHENAZOPYRIDINE HYDROCHLORIDE 200 MG/1
200 TABLET, FILM COATED ORAL
Qty: 15 TAB | Refills: 0 | Status: SHIPPED | OUTPATIENT
Start: 2020-12-04 | End: 2020-12-07

## 2020-12-04 RX ORDER — EPHEDRINE SULFATE/0.9% NACL/PF 50 MG/5 ML
SYRINGE (ML) INTRAVENOUS AS NEEDED
Status: DISCONTINUED | OUTPATIENT
Start: 2020-12-04 | End: 2020-12-04 | Stop reason: HOSPADM

## 2020-12-04 RX ORDER — HYDROCODONE BITARTRATE AND ACETAMINOPHEN 5; 325 MG/1; MG/1
1 TABLET ORAL
Qty: 15 TAB | Refills: 0 | Status: SHIPPED | OUTPATIENT
Start: 2020-12-04 | End: 2020-12-07

## 2020-12-04 RX ORDER — MIDAZOLAM HYDROCHLORIDE 1 MG/ML
2 INJECTION, SOLUTION INTRAMUSCULAR; INTRAVENOUS
Status: DISCONTINUED | OUTPATIENT
Start: 2020-12-04 | End: 2020-12-04 | Stop reason: HOSPADM

## 2020-12-04 RX ORDER — CEPHALEXIN 500 MG/1
500 CAPSULE ORAL 3 TIMES DAILY
Qty: 15 CAP | Refills: 0 | Status: SHIPPED | OUTPATIENT
Start: 2020-12-04 | End: 2020-12-09

## 2020-12-04 RX ORDER — SODIUM CHLORIDE 0.9 % (FLUSH) 0.9 %
5-40 SYRINGE (ML) INJECTION EVERY 8 HOURS
Status: DISCONTINUED | OUTPATIENT
Start: 2020-12-04 | End: 2020-12-04 | Stop reason: HOSPADM

## 2020-12-04 RX ORDER — NALOXONE HYDROCHLORIDE 0.4 MG/ML
0.1 INJECTION, SOLUTION INTRAMUSCULAR; INTRAVENOUS; SUBCUTANEOUS AS NEEDED
Status: DISCONTINUED | OUTPATIENT
Start: 2020-12-04 | End: 2020-12-04 | Stop reason: HOSPADM

## 2020-12-04 RX ORDER — LIDOCAINE HYDROCHLORIDE 20 MG/ML
INJECTION, SOLUTION EPIDURAL; INFILTRATION; INTRACAUDAL; PERINEURAL AS NEEDED
Status: DISCONTINUED | OUTPATIENT
Start: 2020-12-04 | End: 2020-12-04 | Stop reason: HOSPADM

## 2020-12-04 RX ORDER — PREDNISOLONE/GATIFLOX/NEPAFEN 1-0.5-0.1%
SUSPENSION, DROPS(FINAL DOSAGE FORM)(ML) OPHTHALMIC (EYE)
COMMUNITY

## 2020-12-04 RX ORDER — SODIUM CHLORIDE, SODIUM LACTATE, POTASSIUM CHLORIDE, CALCIUM CHLORIDE 600; 310; 30; 20 MG/100ML; MG/100ML; MG/100ML; MG/100ML
100 INJECTION, SOLUTION INTRAVENOUS CONTINUOUS
Status: DISCONTINUED | OUTPATIENT
Start: 2020-12-04 | End: 2020-12-04 | Stop reason: HOSPADM

## 2020-12-04 RX ORDER — ASCORBIC ACID 500 MG
TABLET ORAL
COMMUNITY

## 2020-12-04 RX ORDER — NAPROXEN 500 MG/1
500 TABLET ORAL 2 TIMES DAILY WITH MEALS
COMMUNITY

## 2020-12-04 RX ORDER — DIPHENHYDRAMINE HYDROCHLORIDE 50 MG/ML
12.5 INJECTION, SOLUTION INTRAMUSCULAR; INTRAVENOUS
Status: DISCONTINUED | OUTPATIENT
Start: 2020-12-04 | End: 2020-12-04 | Stop reason: HOSPADM

## 2020-12-04 RX ADMIN — DEXAMETHASONE SODIUM PHOSPHATE 4 MG: 4 INJECTION, SOLUTION INTRAMUSCULAR; INTRAVENOUS at 07:59

## 2020-12-04 RX ADMIN — ONDANSETRON 4 MG: 2 INJECTION INTRAMUSCULAR; INTRAVENOUS at 07:59

## 2020-12-04 RX ADMIN — LIDOCAINE HYDROCHLORIDE 80 MG: 20 INJECTION, SOLUTION EPIDURAL; INFILTRATION; INTRACAUDAL; PERINEURAL at 07:55

## 2020-12-04 RX ADMIN — SODIUM CHLORIDE, SODIUM LACTATE, POTASSIUM CHLORIDE, AND CALCIUM CHLORIDE: 600; 310; 30; 20 INJECTION, SOLUTION INTRAVENOUS at 07:51

## 2020-12-04 RX ADMIN — FENTANYL CITRATE 50 MCG: 50 INJECTION INTRAMUSCULAR; INTRAVENOUS at 08:01

## 2020-12-04 RX ADMIN — SODIUM CHLORIDE, SODIUM LACTATE, POTASSIUM CHLORIDE, AND CALCIUM CHLORIDE 100 ML/HR: 600; 310; 30; 20 INJECTION, SOLUTION INTRAVENOUS at 06:59

## 2020-12-04 RX ADMIN — CEFAZOLIN 2 G: 1 INJECTION, POWDER, FOR SOLUTION INTRAMUSCULAR; INTRAVENOUS; PARENTERAL at 08:02

## 2020-12-04 RX ADMIN — PROPOFOL 200 MG: 10 INJECTION, EMULSION INTRAVENOUS at 07:55

## 2020-12-04 RX ADMIN — Medication 10 MG: at 08:10

## 2020-12-04 NOTE — OP NOTES
[address] [facility]  OPERATIVE REPORT    Name:  De Gomez  MR#:  [id number]  :  1960  ACCOUNT #:  [de-identified]  DATE OF SERVICE:  2020    PREOPERATIVE DIAGNOSIS:  Left distal ureteral stone. POSTOPERATIVE DIAGNOSIS:  Left distal ureteral stone. PROCEDURE PERFORMED:  Cystoscopy, left ureteroscopy, and left ureteral stent placement. SURGEON:  Wan Navarrete MD    ASSISTANT:  None. ANESTHESIA:  General.    COMPLICATIONS:  None. SPECIMENS REMOVED:  None. IMPLANTS:  A 6-Faroese 26-cm Percuflex stent. ESTIMATED BLOOD LOSS:  Minimal.    FINDINGS:  Per dictation. PROCEDURE:  The patient was taken to the operating room and he had passed some stone fragments, fairly large, prior to coming in this morning but was still having some pain. On x-ray, he still had a pretty large volume of stones in the left distal ureter. He had had a history of stones previously. It was elected, even though he passed a fairly large piece, to go in and check it out because he was still having pain. The patient was taken to the operating room suite, underwent general anesthesia, placed in the dorsal lithotomy position, prepped with Betadine and draped in appropriate manner. A 22-Faroese cystoscope was passed under direct vision up the urethra. There were a few small stone fragments in the prostatic urethra. Upon entering the bladder, there were a few small stone fragments in the floor of the bladder as well. The left ureteral orifice had some edema. No stone was seen at the orifice at this point. Attempt at a bulb tip retrograde pyelogram was performed. I could not get any contrast to go up the ureter, and at this point, I passed a guidewire up the ureter where it met an obstruction about 1 cm up the ureter and then finally passed up into the proximal ureter and into the kidney.   The cystoscope was removed and the ureteroscope was passed under the guidewire up through the distal ureter. The distal ureter had a lot of edema. I passed the area of maximum edema and found two very small stone fragments up in the ureter. I went up the ureter all the way over the guidewire to the renal pelvis and no stones were seen along the course of the proximal ureter or sitting at the renal pelvis. The scope was slowly withdrawn. The two small stone fragments in the distal ureter as I pulled the scope back out of the ureteral orifice, they flushed out into the bladder. I reinserted the ureteroscope up the ureter over the guidewire and no further stones were seen. It was felt that he had passed most of the stone and had a lot of ureteral edema that may have resolved on its own. However, I elected to place a ureteral stent. A 7-Micronesian 26-cm Percuflex stent with a string left in the urethra was passed up into the kidney. There was good positioning. The string was left in the bulbar urethra for later removal with the cystoscope. The patient tolerated the procedure well, was sent to recovery area in stable condition.   He would follow up next Tuesday for cystoscopy and stent removal.      Lilian Espinoza MD      [id number]  D:  12/04/2020 8:45  T:  12/04/2020 9:33  JOB #:  [id number]

## 2020-12-04 NOTE — H&P
History and Physical    Subjective: Sarwat Martino is a 56 y.o. male evaluated with a stone in the left lower ureter. CT showed a 8 mm stone. Patient presented with pain  4. Past Medical History:   Diagnosis Date    Anemia     GERD (gastroesophageal reflux disease)     controlled with med    History of kidney stones 2018    X1 with surgical intervention    History of shingles 2016    has residual outbreaks R eye    Kidney stone onset 2018    MSSA (methicillin susceptible Staphylococcus aureus) 10/23/2019    postive nasal swab     Past Surgical History:   Procedure Laterality Date    HX CATARACT REMOVAL Right     HX CERVICAL FUSION  10/31/2019    C3-7 posterior cervical fusion for spinal cord compression Dr. Daley Most HX COLONOSCOPY      HX KNEE ARTHROSCOPY Bilateral early 2000    L and R knee scope    HX LAP CHOLECYSTECTOMY  2010    HX LITHOTRIPSY  11/2018,10/4/2019    HX TONSILLECTOMY      HX WISDOM TEETH EXTRACTION      NEUROLOGICAL PROCEDURE UNLISTED  1996    lumber - for herniated disc      Family History   Problem Relation Age of Onset    Hypertension Mother     Diabetes Mother         po meds only    Stroke Mother     Stroke Father     Lung Disease Father     Hypertension Sister     Hypertension Brother     Hypertension Sister     Hypertension Sister     Cancer Sister         Pancreatic Cancer     Social History     Tobacco Use    Smoking status: Never Smoker    Smokeless tobacco: Never Used   Substance Use Topics    Alcohol use: No     Allergies   Allergen Reactions    Sulfa (Sulfonamide Antibiotics) Other (comments)     \" muscle cramps\"     Prior to Admission medications    Medication Sig Start Date End Date Taking? Authorizing Provider   ascorbic acid, vitamin C, (Vitamin C) 500 mg tablet Take  by mouth. Yes Provider, Historical   diphenhydrAMINE (BenadryL) 25 mg capsule Take 25 mg by mouth nightly as needed.    Yes Provider, Historical   naproxen (NAPROSYN) 500 mg tablet Take 500 mg by mouth two (2) times daily (with meals). Yes Provider, Historical   zolpidem (Ambien) 5 mg tablet Take  by mouth nightly as needed for Sleep. Yes Provider, Historical   prednisoLONE acetate, PF, 1 % drps Apply  to eye. Yes Provider, Historical   amLODIPine (NORVASC) 5 mg tablet Take 5 mg by mouth nightly. 3/10/20  Yes Provider, Historical   tiZANidine (ZANAFLEX) 4 mg tablet Take 1 Tab by mouth three (3) times daily as needed (post op cervical muscle spasms). Indications: muscle spasm  Patient taking differently: Take 4 mg by mouth nightly. Indications: muscle spasm 19  Yes Juan Talbot MD   tamsulosin [facility]) 0.4 mg capsule Take 1 Cap by mouth daily. Patient taking differently: Take 0.4 mg by mouth nightly. 19  Yes Hilary Agarwal NP   omeprazole (PRILOSEC) 40 mg capsule Take 1 Cap by mouth nightly. 19  Yes Provider, Historical   zinc 50 mg tab tablet Take  by mouth daily. Provider, Historical   ferrous sulfate (Iron) 325 mg (65 mg iron) tablet Take  by mouth nightly. Provider, Historical   acetaminophen (TYLENOL EXTRA STRENGTH) 500 mg tablet Take 1,000 mg by mouth every eight (8) hours as needed for Pain. Provider, Historical   multivitamin (ONE A DAY) tablet Take 1 Tab by mouth daily. Provider, Historical        Review of Systems:  Pertinent items are noted in HPI. Objective:        Patient Vitals for the past 8 hrs:   BP Temp Pulse Resp SpO2   20 0652 136/85 98.6 °F (37 °C) 82 16 99 %       Temp (24hrs), Av.6 °F (37 °C), Min:98.6 °F (37 °C), Max:98.6 °F (37 °C)      Physical Exam:  GENERAL: alert, cooperative, no distress, appears stated age, LUNG: clear to auscultation bilaterally, HEART: regular rate and rhythm, S1, S2 normal, no murmur, click, rub or gallop, ABDOMEN: soft, non-tender. Bowel sounds normal. No masses,  no organomegaly      Assessment:     ureterolithiasis left 8mm stone. Plan:     1.  The risks, benefits, complications, treatment options, and expected outcomes were discussed with the patient. The patient understands the risks, any and all questions were answered to the patient's satisfaction.   2. Proceed with outpatient cystoscopy, ureteroscopy and laser lithotripsy

## 2020-12-04 NOTE — ANESTHESIA PREPROCEDURE EVALUATION
Anesthetic History   No history of anesthetic complications            Review of Systems / Medical History  Patient summary reviewed and pertinent labs reviewed    Pulmonary  Within defined limits                 Neuro/Psych   Within defined limits          Comments: Upper right radicular pain Cardiovascular    Hypertension: well controlled              Exercise tolerance: >4 METS     GI/Hepatic/Renal     GERD: well controlled    Renal disease: stones       Endo/Other  Within defined limits           Other Findings              Physical Exam    Airway  Mallampati: I  TM Distance: 4 - 6 cm  Neck ROM: normal range of motion   Mouth opening: Normal     Cardiovascular  Regular rate and rhythm,  S1 and S2 normal,  no murmur, click, rub, or gallop  Rhythm: regular  Rate: normal         Dental  No notable dental hx       Pulmonary  Breath sounds clear to auscultation               Abdominal  GI exam deferred       Other Findings            Anesthetic Plan    ASA: 2  Anesthesia type: general          Induction: Intravenous  Anesthetic plan and risks discussed with: Patient and Family

## 2020-12-04 NOTE — ANESTHESIA POSTPROCEDURE EVALUATION
Procedure(s):  CYSTOSCOPY LEFT URETEROSCOPY   LEFT STENT PLACEMENT.     general    Anesthesia Post Evaluation      Multimodal analgesia: multimodal analgesia used between 6 hours prior to anesthesia start to PACU discharge  Patient location during evaluation: bedside  Patient participation: complete - patient participated  Level of consciousness: awake and alert  Pain management: adequate  Airway patency: patent  Anesthetic complications: no  Cardiovascular status: acceptable  Respiratory status: acceptable  Hydration status: acceptable  Post anesthesia nausea and vomiting:  controlled  Final Post Anesthesia Temperature Assessment:  Normothermia (36.0-37.5 degrees C)      INITIAL Post-op Vital signs:   Vitals Value Taken Time   /83 12/4/2020  8:53 AM   Temp 36.4 °C (97.6 °F) 12/4/2020  8:33 AM   Pulse 72 12/4/2020  8:53 AM   Resp 16 12/4/2020  8:53 AM   SpO2 100 % 12/4/2020  8:53 AM

## 2020-12-04 NOTE — BRIEF OP NOTE
Brief Postoperative Note    Patient: Ellie Dumas  YOB: 1960  MRN: [id number]    Date of Procedure: 12/4/2020     Pre-Op Diagnosis: Ureteral stone [N20.1]    Post-Op Diagnosis: same    Procedure(s):  CYSTOSCOPY LEFT URETEROSCOPY   LEFT STENT PLACEMENT    Surgeon(s):  Harish Law MD    Surgical Assistant: None    Anesthesia: General     Estimated Blood Loss (mL): Minimal    Complications: None    Specimens: * No specimens in log *     Implants:   Implant Name Type Inv.  Item Serial No.  Lot No. LRB No. Used Action   STENT URET 7FR L26CM PERCFLX + HYDR+ FIRM DUROMETER DB - [id number]  STENT URET 7FR L26CM PERCFLX + HYDR+ FIRM DUROMETER [facility]  Attensity [facility] UROLOGY_ [id number] Left 1 Implanted       Drains:   [REMOVED] Hemovac Mid;Posterior Neck (Removed)       Findings:     Electronically Signed by Michaela Bautista MD on 12/4/2020 at 8:30 AM

## 2020-12-04 NOTE — PERIOP NOTES
Discharge instructions discussed with Renae Serrano, son at bedside. No questions or concerns at this time.

## 2020-12-04 NOTE — DISCHARGE INSTRUCTIONS
Ureteral Stent Placement: What to Expect at [address] [place] [address]  A ureteral (say \"you-REE-ter-ul\") stent is a thin, hollow tube that is placed in the ureter to help urine pass from the kidney into the bladder. Ureters are the tubes that connect the kidneys to the bladder. You may have a small amount of blood in your urine for 1 to 3 days after the procedure. While the stent is in place, you may have to urinate more often, feel a sudden need to urinate, or feel like you cannot completely empty your bladder. You may feel some pain when you urinate or do strenuous activity. You also may notice a small amount of blood in your urine after strenuous activities. These side effects usually do not prevent people from doing their normal daily activities. You may have a string attached to your stent. Do not to pull the string unless the doctor tells you to. The doctor will use the string to pull out the stent when you no longer need it. After the procedure, urine may flow better from your kidneys to your bladder. A ureteral stent may be left in place for several days or for as long as several months. Your doctor will take it out when you no longer need it. Cystoscopy: What to Expect at [address] [facility]  A cystoscopy is a procedure that lets a doctor look inside of the bladder and the urethra. The urethra is the tube that carries urine from the bladder to outside the body. The doctor uses a thin, lighted tube called a cystoscope to look for kidney or bladder stones, tumors, bleeding, or infection. After the cystoscopy, your urethra may be sore at first, and it may burn when you urinate for the first few days after the procedure. You may feel the need to urinate more often, and your urine may be pink. These symptoms should get better in 1 or 2 days. You will probably be able to go back to work or most of your usual activities in 1 or 2 days. How can you care for yourself at home?   Activity  · Rest when you feel tired. Getting enough sleep will help you recover. · Try to walk each day. Start by walking a little more than you did the day before. Bit by bit, increase the amount you walk. Walking boosts blood flow and helps prevent pneumonia and constipation. · Avoid strenuous activities, such as bicycle riding, jogging, weight lifting, or aerobic exercise, until your doctor says it is okay. · Ask your doctor when you can drive again. · Most people are able to return to work within 1 or 2 days after the procedure. · You may shower and take baths as usual.  · Ask your doctor when it is okay for you to have sex. Diet  · You can eat your normal diet. If your stomach is upset, try bland, low-fat foods like plain rice, broiled chicken, toast, and yogurt. · Drink plenty of fluids (unless your doctor tells you not to). Medicines  · Take pain medicines exactly as directed. ¨ If the doctor gave you a prescription medicine for pain, take it as prescribed. ¨ If you are not taking a prescription pain medicine, ask your doctor if you can take an over-the-counter medicine. · If you think your pain medicine is making you sick to your stomach:  ¨ Take your medicine after meals (unless your doctor has told you not to). ¨ Ask your doctor for a different pain medicine. · If your doctor prescribed antibiotics, take them as directed. Do not stop taking them just because you feel better. You need to take the full course of antibiotics. Follow-up care is a key part of your treatment and safety. Be sure to make and go to all appointments, and call your doctor if you are having problems. It's also a good idea to know your test results and keep a list of the medicines you take. When should you call for help? Call 911 anytime you think you may need emergency care. For example, call if:  · You passed out (lost consciousness). · You have severe trouble breathing.   · You have sudden chest pain and shortness of breath, or you cough up blood.  · You have severe belly pain. Call your doctor now or seek immediate medical care if:  · You are sick to your stomach or cannot keep fluids down. · Your urine is still red or you see blood clots after you have urinated several times. · You have trouble passing urine or stool, especially if you have pain or swelling in your lower belly. · You have signs of a blood clot, such as:  ¨ Pain in your calf, back of the knee, thigh, or groin. ¨ Redness and swelling in your leg or groin. · You develop a fever or severe chills. · You have pain in your back just below your rib cage. This is called flank pain. Watch closely for changes in your health, and be sure to contact your doctor if:  · A burning feeling is normal for a day or two after the test, but call if it does not get better. · You have a frequent urge to urinate but can pass only small amounts of urine. · It is normal for the urine to have a pinkish color for a few days after the test, but call if it does not get better or if your urine is cloudy, smells bad, or has pus in it. After general anesthesia or intravenous sedation, for 24 hours or while taking prescription Narcotics:  · Limit your activities  · A responsible adult needs to be with you for the next 24 hours  · Do not drive and operate hazardous machinery  · Do not make important personal or business decisions  · Do not drink alcoholic beverages  · If you have not urinated within 8 hours after discharge, and you are experiencing discomfort from urinary retention, please go to the nearest ED. · If you have sleep apnea and have a CPAP machine, please use it for all naps and sleeping. · Please use caution when taking narcotics and any of your home medications that may cause drowsiness. *  Please give a list of your current medications to your Primary Care Provider.   *  Please update this list whenever your medications are discontinued, doses are      changed, or new medications (including over-the-counter products) are added. *  Please carry medication information at all times in case of emergency situations. These are general instructions for a healthy lifestyle:  No smoking/ No tobacco products/ Avoid exposure to second hand smoke  Surgeon General's Warning:  Quitting smoking now greatly reduces serious risk to your health. Obesity, smoking, and sedentary lifestyle greatly increases your risk for illness  A healthy diet, regular physical exercise & weight monitoring are important for maintaining a healthy lifestyle    You may be retaining fluid if you have a history of heart failure or if you experience any of the following symptoms:  Weight gain of 3 pounds or more overnight or 5 pounds in a week, increased swelling in our hands or feet or shortness of breath while lying flat in bed. Please call your doctor as soon as you notice any of these symptoms; do not wait until your next office visit.

## 2022-03-19 PROBLEM — I10 ESSENTIAL HYPERTENSION: Status: ACTIVE | Noted: 2019-11-01

## 2022-03-20 PROBLEM — G95.9 CERVICAL MYELOPATHY (HCC): Status: ACTIVE | Noted: 2019-10-31

## 2022-03-20 PROBLEM — K21.9 GERD (GASTROESOPHAGEAL REFLUX DISEASE): Status: ACTIVE | Noted: 2019-11-01

## 2022-09-13 ENCOUNTER — OFFICE VISIT (OUTPATIENT)
Dept: ORTHOPEDIC SURGERY | Age: 62
End: 2022-09-13
Payer: COMMERCIAL

## 2022-09-13 DIAGNOSIS — M65.832 EXTENSOR TENOSYNOVITIS OF LEFT WRIST: Primary | ICD-10-CM

## 2022-09-13 PROCEDURE — 99204 OFFICE O/P NEW MOD 45 MIN: CPT | Performed by: ORTHOPAEDIC SURGERY

## 2022-09-13 NOTE — PROGRESS NOTES
Orthopaedic Hand Clinic Note    Name: Marcus Gray  YOB: 1960  Gender: male  MRN: [id number]      CC: Patient referred for evaluation of upper extremity pain    HPI: Marcus Gray is a 59 y.o. male Right hand dominant with a chief complaint of left dorsal and wrist swelling, patient reports over the past 7 months slowly enlarging mass on the dorsal aspect of the left hand, this is not particularly painful but it does bother him when he hits it and he is concerned about how large it has gotten. ROS/Meds/PSH/PMH/FH/SH: I personally reviewed the patients standard intake form. Pertinents are discussed in the HPI    Physical Examination:  General: Awake and alert. HEENT: Normocephalic, atraumatic  CV/Pulm: Breathing even and unlabored  Skin: No obvious rashes noted. Lymphatic: No obvious evidence of lymphedema or lymphadenopathy    Musculoskeletal Exam:  Examination on the left upper extremity demonstrates cap refill < 5 seconds in all fingers, full finger motion, there is significant swelling on the dorsal aspect of the left wrist, palpable fluctuance, this fluctuance is mobile with finger flexion and extension so it appears to be related to tenosynovitis of the extensor tendons, this fluctuance is not particularly painful. Imaging / Electrodiagnostic Tests:     left Hand XR: AP, Lateral, Oblique and Thumb CMC joint     Clinical Indication:  1.  Extensor tenosynovitis of left wrist           Report: AP, lateral, oblique and thumb CMC joint hand XRs demonstrates well-maintained joint spaces without significant evidence of arthritis other than mild osteophytic changes at the trapezium    Impression: as above     Jonn Coughlin MD     Ultrasound examination demonstrates profuse hypoechoic area surrounding the fourth extensor compartment consistent with fluid collection, this appears to be homogeneous consistent with ganglion cyst or tenosynovitis of the fourth extensor compartment    Assessment:   1. Extensor tenosynovitis of left wrist        Plan:   We discussed the diagnosis and different treatment options. We discussed observation, therapy, antiinflammatory medications and other pertinent treatment modalities. After discussing in detail the patient elects to proceed with left dorsal wrist mass excision, we discussed the more likely than not this is extensor tenosynovitis, less likely a ganglion cyst, if this is in fact tenosynovitis then we do not have an etiology to explain this, I explained that there is sometimes trivial injuries that cause tenosynovitis but the patient is sure that he has not sustained an injury. Less likely this could be a giant cell tumor of tendon sheath or PVNS, during surgery we will send the specimen to pathology for evaluation, I explained that more likely than not we could stay distal to the retinaculum and avoid immobilization but there is a possibility of invading the retinaculum in order to perform extensive tenosynovitis. Depending on the findings on pathology we may have to perform further work-up to rule out inflammatory disease. Patient understands risks and benefits of LEFT DORSAL WRIST MASS EXCISION including but not limited to nerve injury, vessel injury, infection, failure to achieve desired results and possible need for additional surgery. Patient understands and wishes to proceed with surgery. On Exam:   The patient is alert and oriented; ;   Lung auscultation is clear bilaterally   Heart has RRR without murmurs  . Patient voiced accordance and understanding of the information provided and the formulated plan. All questions were answered to the patient's satisfaction during the encounter.     Anca Lewis MD  Orthopaedic Surgery  09/13/22  12:18 PM

## 2022-09-26 ENCOUNTER — TELEPHONE (OUTPATIENT)
Dept: ORTHOPEDIC SURGERY | Age: 62
End: 2022-09-26

## 2022-09-29 DIAGNOSIS — M65.832 EXTENSOR TENOSYNOVITIS OF LEFT WRIST: Primary | ICD-10-CM

## 2022-09-30 PROBLEM — M25.831 MASS OF JOINT OF RIGHT WRIST: Status: ACTIVE | Noted: 2022-09-30

## 2022-10-17 RX ORDER — MULTIVIT-MIN/IRON/FOLIC ACID/K 18-600-40
CAPSULE ORAL NIGHTLY
COMMUNITY

## 2022-10-17 RX ORDER — FAMOTIDINE 40 MG/1
TABLET, FILM COATED ORAL
COMMUNITY
Start: 2022-09-06

## 2022-10-17 NOTE — PERIOP NOTE
Phone pre-assessment completed. Verified name&  . Order to obtain consent not found in EHR; pt verified case posting. Type 1B surgery, phone assessment complete. Orders not received. Labs per surgeon: DEANNE  Labs per anesthesia protocol: NA      Medical/surgical history questions answered at their best of ability. All prior to admission medications reviewed and documented in [facility]. Instructed to take ONLY THE FOLLOWING MEDICATIONS ON THE DAY OF SURGERY according to anesthesia guidelines with sips of water: amlodipine . Verbalizes understanding to HOLD THE FOLLOWING MEDICATIONS: NA    VERBALIZES UNDERSTANDING TO HOLD ALL VITAMINS AND SUPPLEMENTS 7 DAYS PRIOR TO SURGERY DATE (with exception to Renal Vitamins) and NSAIDS (naproxen) 5 DAYS PRIOR TO SURGERY DATE PER ANESTHESIA PROTOCOL. Instructed on the following:    > Arrive at [place], time of arrival to be called the day before by 1700  > NPO after midnight including gum, mints, and ice chips  > Responsible adult must drive patient to the hospital, stay during surgery, and patient will need supervision 24 hours after anesthesia  > Use antibacterial soap in shower the night before surgery and on the morning of surgery  > All piercings must be removed prior to arrival.    > Leave all valuables (money and jewelry) at home but bring insurance card and ID on DOS.   > You may be required to pay a deductible or co-pay on the day of your procedure. You can pre-pay by calling [phone] if your surgery is at the [facility] or [phone] if your surgery is at the [facility]. > Do not wear make-up, nail polish, lotions, cologne, perfumes, powders, or oil on skin. Artificial nails are not permitted. Teach back successful and demonstrates knowledge of instruction. You will received a call from the pre-op nurse by 5 pm on the business day prior to the scheduled procedure.  If you have not spoken with a nurse, please check your voicemail. If you have not received an arrival time by 5 pm, please call [phone].

## 2022-10-19 ENCOUNTER — ANESTHESIA EVENT (OUTPATIENT)
Dept: SURGERY | Age: 62
End: 2022-10-19
Payer: COMMERCIAL

## 2022-10-19 DIAGNOSIS — M65.832 EXTENSOR TENOSYNOVITIS OF LEFT WRIST: Primary | ICD-10-CM

## 2022-10-19 RX ORDER — SODIUM CHLORIDE 9 MG/ML
INJECTION, SOLUTION INTRAVENOUS PRN
Status: CANCELLED | OUTPATIENT
Start: 2022-10-19

## 2022-10-19 NOTE — H&P
History and Physical    Subjective: Jake Bravo is a 57 y.o. scheduled for left  dorsal wrist mass excision. Patient is complaining of painful clicking of the right thumb    Past Medical History:   Diagnosis Date    Anemia     pt denies    GERD (gastroesophageal reflux disease)     controlled with med    History of kidney stones 2018    X1 with surgical intervention    History of shingles 2016    has residual outbreaks R eye    Kidney stone onset 2018    MSSA (methicillin susceptible Staphylococcus aureus) 10/23/2019    postive nasal swab      Past Surgical History:   Procedure Laterality Date    CATARACT REMOVAL Right     CERVICAL FUSION  10/31/2019    C3-7 posterior cervical fusion for spinal cord compression Dr. Prem Bedoya, LAPAROSCOPIC  2010    COLONOSCOPY      KNEE ARTHROSCOPY Bilateral early 2000    L and R knee scope    LITHOTRIPSY  11/2018,10/4/2019    NEUROLOGICAL SURGERY  1996    lumber - for herniated disc    TONSILLECTOMY      WISDOM TOOTH EXTRACTION       Social History     Tobacco Use    Smoking status: Never    Smokeless tobacco: Never   Substance Use Topics    Alcohol use: No       Allergies   Allergen Reactions    Sulfa Antibiotics Other (See Comments)     \" muscle cramps\"        Review of Systems:  A comprehensive review of systems was negative except for that written in the History of Present Illness. Objective:     Physical Exam:   NAD, heart with regular rate and rhythm, lungs clear to auscultation    Extremity exam:  Examination on the left upper extremity demonstrates cap refill < 5 seconds in all fingers, full finger motion, there is significant swelling on the dorsal aspect of the left wrist, palpable fluctuance, this fluctuance is mobile with finger flexion and extension so it appears to be   related to tenosynovitis of the extensor tendons, this fluctuance is not particularly painful.     Right thumb demonstrates painful clicking and tenderness of the A1 pulley consistent with a trigger thumb    Imaging / Electrodiagnostic Tests:     left Hand XR: AP, Lateral, Oblique and Thumb CMC joint     Clinical Indication:  1.  Extensor tenosynovitis of left wrist           Report: AP, lateral, oblique and thumb CMC joint hand XRs demonstrates well-maintained joint spaces without significant evidence of arthritis other than mild osteophytic changes at the trapezium    Impression: as above     Edson Roberts MD     Ultrasound examination demonstrates profuse hypoechoic area surrounding the fourth extensor compartment consistent with fluid collection, this appears to be homogeneous consistent with ganglion cyst or tenosynovitis of the fourth extensor compartment    Assessment:     Hospital Problems             Last Modified POA    * (Principal) Mass of joint of right wrist 9/30/2022 Unknown    Overview Signed 9/30/2022  9:01 AM by Parvez Cavanaugh MA     Added automatically from request for surgery [id number]             Plan:     Plan for left dorsal wrist mass excision today, patient would also like to add a right trigger thumb steroid injection    Breonna Overton MD, PhD  Orthopaedic Surgery  10/20/22  8:31 AM

## 2022-10-19 NOTE — DISCHARGE INSTRUCTIONS
Postoperative  Instructions:      Weightbearing or Lifting:  Limit  weight  lifting  to  less  than  1  pound  (coffee  mug)  for  the  first  2  weeks  after  surgery. Dressing  instructions:    Keep  your  dressing  and/or  splint  clean  and  dry  at  all  times. You  can  remove  your  dressing  on  post-operative  day  #5  and  change  with  a  dry/sterile  dressing  or  Band-Aids  as  needed  thereafter. Showering  Instructions:  May  shower  But keep surgical dressing clean and dry until removed as explained above. After dressing is removed, you may allow soapy water to run through the incision during showers but do not scrub. After each shower, pat dry and apply a dry dressing. Do  not  soak  your  Incision in still water or bathtub  for  3  weeks  after  surgery. If  the  incision  gets  wet otherwise,  pat  dry  and  do  not  scrub  the  incision. Do  not  apply  cream  or  lotion  to  incision      Pain  Control:  - You  have  been  given  a  prescription  to  be  taken  as  directed  for  post-operative  pain  control. In  addition,  elevate  the  operative  extremity  above  the  heart  at  all  times  to  prevent  swelling  and  throbbing  pain. - If you develop constipation while taking narcotic pain medications (Norco, Hydrocodone, Percocet, Oxycodone, Dilaudid, Hydromorphone) take  over-the-counter  Colace,  100mg  by  mouth  twice  a  Day. - Nausea  is  a  common  side  effect  of  many  pain  medications. You  will  want  to  eat something  before  taking  your  pain  medicine  to  help  prevent  Nausea. - If  you  are  taking  a  prescription  pain  medication  that  contains  acetaminophen,  we  recommend  that  you  do  not  take  additional  over  the  counter  acetaminophen  (Tylenol®).       Other  pain  relieving  options:   - Using  a  cold  pack  to  ice  the  affected  area  a  few  times  a  day  (15  to  20  minutes  at  a  time)  can  help  to  relieve pain,  reduce  swelling  and  bruising.      - Elevation  of  the  affected  area  can  also  help  to  reduce  pain  and  swelling. Did  you  receive  a  nerve  Block? A  nerve  block  can  provide  pain  relief  for  one  hour  to  two  days  after  your  surgery. As  long  as  the  nerve  block  is  working,  you  will  experience  little  or  no  sensation  in  the  area  the  surgeon  operated  on. As  the  nerve  block  wears  off,  you  will  begin  to  experience  pain  or  discomfort. It  is  very  important  that  you  begin  taking  your  prescribed  pain  medication  before  the  nerve  block  fully  wears  off. The first sign that the nerve block is wearing off is tingling in your fingers. Treating  your  pain  at  the  first  sign  of  the  block  wearing  off  will  ensure  your  pain  is  better  controlled  and  more  tolerable  when  full-sensation  returns. Do  not  wait  until  the  pain  is  intolerable,  as  the  medicine  will  be  less  effective. It  is  better  to  treat  pain  in  advance  than  to  try  and  catch  up. General  Anesthesia or Sedation:      If  you  did  not  receive  a  nerve  block  during  your  surgery,  you  will  need  to  start  taking  your  pain  medication  shortly  after  your  surgery  and  should  continue  to  do  so  as  prescribed  by  your  surgeon. Please  contact us through my chart or call  [phone]  with any concern and ask to speak with Dr Dawson Petersen team.  Concerning problems include:      -  Excessive  redness  of  the  incisions      -  Drainage  for  more  than  2  Days after surgery or any foul smelling drainage  -  Fever  of  more  than  101.5  F      Please  call  [phone]  if  you  do  not  receive  or  are  unsure  of  your  first  follow-up  appointment. You  should  see  the  doctor  10-14  days  after  your  Surgery. Thank you for choosing me and [address] [place] [address] for your care.  I will go above and beyond to ensure you receive the best care possible. Scarlet Monroy MD, PhD    MEDICATION INTERACTION:  During your procedure you potentially received a medication or medications which may reduce the effectiveness of oral contraceptives. Please consider other forms of contraception for 1 month following your procedure if you are currently using oral contraceptives as your primary form of birth control. In addition to this, we recommend continuing your oral contraceptive as prescribed, unless otherwise instructed by your physician, during this time    After general anesthesia or intravenous sedation, for 24 hours or while taking prescription Narcotics:  Limit your activities  A responsible adult needs to be with you for the next 24 hours  Do not drive and operate hazardous machinery  Do not make important personal or business decisions  Do not drink alcoholic beverages  If you have not urinated within 8 hours after discharge, and you are experiencing discomfort from urinary retention, please go to the nearest ED. If you have sleep apnea and have a CPAP machine, please use it for all naps and sleeping. Please use caution when taking narcotics and any of your home medications that may cause drowsiness. *  Please give a list of your current medications to your Primary Care Provider. *  Please update this list whenever your medications are discontinued, doses are      changed, or new medications (including over-the-counter products) are added. *  Please carry medication information at all times in case of emergency situations. These are general instructions for a healthy lifestyle:  No smoking/ No tobacco products/ Avoid exposure to second hand smoke  Surgeon General's Warning:  Quitting smoking now greatly reduces serious risk to your health.   Obesity, smoking, and sedentary lifestyle greatly increases your risk for illness  A healthy diet, regular physical exercise & weight monitoring are important for maintaining a healthy lifestyle    You may be retaining fluid if you have a history of heart failure or if you experience any of the following symptoms:  Weight gain of 3 pounds or more overnight or 5 pounds in a week, increased swelling in our hands or feet or shortness of breath while lying flat in bed. Please call your doctor as soon as you notice any of these symptoms; do not wait until your next office visit.

## 2022-10-20 ENCOUNTER — HOSPITAL ENCOUNTER (OUTPATIENT)
Age: 62
Setting detail: OUTPATIENT SURGERY
Discharge: HOME | End: 2022-10-20
Attending: ORTHOPAEDIC SURGERY | Admitting: ORTHOPAEDIC SURGERY
Payer: COMMERCIAL

## 2022-10-20 ENCOUNTER — ANESTHESIA (OUTPATIENT)
Dept: SURGERY | Age: 62
End: 2022-10-20
Payer: COMMERCIAL

## 2022-10-20 VITALS
HEIGHT: 68 IN | WEIGHT: 178 LBS | RESPIRATION RATE: 15 BRPM | OXYGEN SATURATION: 99 % | TEMPERATURE: 97.5 F | DIASTOLIC BLOOD PRESSURE: 92 MMHG | HEART RATE: 63 BPM | BODY MASS INDEX: 26.98 KG/M2 | SYSTOLIC BLOOD PRESSURE: 161 MMHG

## 2022-10-20 DIAGNOSIS — M25.831 MASS OF JOINT OF RIGHT WRIST: ICD-10-CM

## 2022-10-20 PROBLEM — M65.311 TRIGGER THUMB OF RIGHT HAND: Status: ACTIVE | Noted: 2022-10-20

## 2022-10-20 PROCEDURE — 2709999900 HC NON-CHARGEABLE SUPPLY: Performed by: ORTHOPAEDIC SURGERY

## 2022-10-20 PROCEDURE — 3700000000 HC ANESTHESIA ATTENDED CARE: Performed by: ORTHOPAEDIC SURGERY

## 2022-10-20 PROCEDURE — 6370000000 HC RX 637 (ALT 250 FOR IP): Performed by: ANESTHESIOLOGY

## 2022-10-20 PROCEDURE — 2580000003 HC RX 258: Performed by: NURSE ANESTHETIST, CERTIFIED REGISTERED

## 2022-10-20 PROCEDURE — 6360000002 HC RX W HCPCS: Performed by: NURSE ANESTHETIST, CERTIFIED REGISTERED

## 2022-10-20 PROCEDURE — 2500000003 HC RX 250 WO HCPCS: Performed by: ORTHOPAEDIC SURGERY

## 2022-10-20 PROCEDURE — 3600000003 HC SURGERY LEVEL 3 BASE: Performed by: ORTHOPAEDIC SURGERY

## 2022-10-20 PROCEDURE — 7100000000 HC PACU RECOVERY - FIRST 15 MIN: Performed by: ORTHOPAEDIC SURGERY

## 2022-10-20 PROCEDURE — 7100000010 HC PHASE II RECOVERY - FIRST 15 MIN: Performed by: ORTHOPAEDIC SURGERY

## 2022-10-20 PROCEDURE — 3600000013 HC SURGERY LEVEL 3 ADDTL 15MIN: Performed by: ORTHOPAEDIC SURGERY

## 2022-10-20 PROCEDURE — 7100000001 HC PACU RECOVERY - ADDTL 15 MIN: Performed by: ORTHOPAEDIC SURGERY

## 2022-10-20 PROCEDURE — 6360000002 HC RX W HCPCS: Performed by: ORTHOPAEDIC SURGERY

## 2022-10-20 PROCEDURE — 2500000003 HC RX 250 WO HCPCS: Performed by: NURSE ANESTHETIST, CERTIFIED REGISTERED

## 2022-10-20 PROCEDURE — 25116 REMOVE WRIST/FOREARM LESION: CPT | Performed by: ORTHOPAEDIC SURGERY

## 2022-10-20 PROCEDURE — 88305 TISSUE EXAM BY PATHOLOGIST: CPT

## 2022-10-20 PROCEDURE — 3700000001 HC ADD 15 MINUTES (ANESTHESIA): Performed by: ORTHOPAEDIC SURGERY

## 2022-10-20 PROCEDURE — 6360000002 HC RX W HCPCS: Performed by: NURSE PRACTITIONER

## 2022-10-20 RX ORDER — BUPIVACAINE HYDROCHLORIDE 2.5 MG/ML
INJECTION, SOLUTION EPIDURAL; INFILTRATION; INTRACAUDAL PRN
Status: DISCONTINUED | OUTPATIENT
Start: 2022-10-20 | End: 2022-10-20 | Stop reason: HOSPADM

## 2022-10-20 RX ORDER — OXYCODONE HYDROCHLORIDE 5 MG/1
10 TABLET ORAL PRN
Status: COMPLETED | OUTPATIENT
Start: 2022-10-20 | End: 2022-10-20

## 2022-10-20 RX ORDER — HYDROMORPHONE HYDROCHLORIDE 2 MG/ML
0.5 INJECTION, SOLUTION INTRAMUSCULAR; INTRAVENOUS; SUBCUTANEOUS EVERY 10 MIN PRN
Status: DISCONTINUED | OUTPATIENT
Start: 2022-10-20 | End: 2022-10-20 | Stop reason: HOSPADM

## 2022-10-20 RX ORDER — DEXAMETHASONE SODIUM PHOSPHATE 10 MG/ML
INJECTION INTRAMUSCULAR; INTRAVENOUS PRN
Status: DISCONTINUED | OUTPATIENT
Start: 2022-10-20 | End: 2022-10-20 | Stop reason: SDUPTHER

## 2022-10-20 RX ORDER — LIDOCAINE HYDROCHLORIDE 5 MG/ML
INJECTION, SOLUTION INFILTRATION; INTRAVENOUS PRN
Status: DISCONTINUED | OUTPATIENT
Start: 2022-10-20 | End: 2022-10-20 | Stop reason: SDUPTHER

## 2022-10-20 RX ORDER — TRAMADOL HYDROCHLORIDE 50 MG/1
50 TABLET ORAL EVERY 4 HOURS PRN
Qty: 28 TABLET | Refills: 0 | Status: SHIPPED | OUTPATIENT
Start: 2022-10-20 | End: 2022-10-25

## 2022-10-20 RX ORDER — SODIUM CHLORIDE 0.9 % (FLUSH) 0.9 %
5-40 SYRINGE (ML) INJECTION EVERY 12 HOURS SCHEDULED
Status: DISCONTINUED | OUTPATIENT
Start: 2022-10-20 | End: 2022-10-20 | Stop reason: HOSPADM

## 2022-10-20 RX ORDER — METHYLPREDNISOLONE ACETATE 40 MG/ML
INJECTION, SUSPENSION INTRA-ARTICULAR; INTRALESIONAL; INTRAMUSCULAR; SOFT TISSUE PRN
Status: DISCONTINUED | OUTPATIENT
Start: 2022-10-20 | End: 2022-10-20 | Stop reason: HOSPADM

## 2022-10-20 RX ORDER — OXYCODONE HYDROCHLORIDE 5 MG/1
5 TABLET ORAL PRN
Status: COMPLETED | OUTPATIENT
Start: 2022-10-20 | End: 2022-10-20

## 2022-10-20 RX ORDER — ACETAMINOPHEN 500 MG
1000 TABLET ORAL ONCE
Status: DISCONTINUED | OUTPATIENT
Start: 2022-10-20 | End: 2022-10-20 | Stop reason: HOSPADM

## 2022-10-20 RX ORDER — ONDANSETRON 2 MG/ML
4 INJECTION INTRAMUSCULAR; INTRAVENOUS
Status: DISCONTINUED | OUTPATIENT
Start: 2022-10-20 | End: 2022-10-20 | Stop reason: HOSPADM

## 2022-10-20 RX ORDER — PROPOFOL 10 MG/ML
INJECTION, EMULSION INTRAVENOUS PRN
Status: DISCONTINUED | OUTPATIENT
Start: 2022-10-20 | End: 2022-10-20 | Stop reason: SDUPTHER

## 2022-10-20 RX ORDER — DIPHENHYDRAMINE HYDROCHLORIDE 50 MG/ML
12.5 INJECTION INTRAMUSCULAR; INTRAVENOUS
Status: DISCONTINUED | OUTPATIENT
Start: 2022-10-20 | End: 2022-10-20 | Stop reason: HOSPADM

## 2022-10-20 RX ORDER — MIDAZOLAM HYDROCHLORIDE 2 MG/2ML
2 INJECTION, SOLUTION INTRAMUSCULAR; INTRAVENOUS
Status: DISCONTINUED | OUTPATIENT
Start: 2022-10-20 | End: 2022-10-20 | Stop reason: HOSPADM

## 2022-10-20 RX ORDER — HYDROMORPHONE HYDROCHLORIDE 2 MG/ML
0.25 INJECTION, SOLUTION INTRAMUSCULAR; INTRAVENOUS; SUBCUTANEOUS EVERY 5 MIN PRN
Status: DISCONTINUED | OUTPATIENT
Start: 2022-10-20 | End: 2022-10-20 | Stop reason: HOSPADM

## 2022-10-20 RX ORDER — FENTANYL CITRATE 50 UG/ML
100 INJECTION, SOLUTION INTRAMUSCULAR; INTRAVENOUS
Status: DISCONTINUED | OUTPATIENT
Start: 2022-10-20 | End: 2022-10-20 | Stop reason: HOSPADM

## 2022-10-20 RX ORDER — SODIUM CHLORIDE, SODIUM LACTATE, POTASSIUM CHLORIDE, CALCIUM CHLORIDE 600; 310; 30; 20 MG/100ML; MG/100ML; MG/100ML; MG/100ML
INJECTION, SOLUTION INTRAVENOUS CONTINUOUS PRN
Status: DISCONTINUED | OUTPATIENT
Start: 2022-10-20 | End: 2022-10-20 | Stop reason: SDUPTHER

## 2022-10-20 RX ORDER — SODIUM CHLORIDE 0.9 % (FLUSH) 0.9 %
5-40 SYRINGE (ML) INJECTION PRN
Status: DISCONTINUED | OUTPATIENT
Start: 2022-10-20 | End: 2022-10-20 | Stop reason: HOSPADM

## 2022-10-20 RX ADMIN — PROPOFOL 100 MCG/KG/MIN: 10 INJECTION, EMULSION INTRAVENOUS at 10:01

## 2022-10-20 RX ADMIN — Medication 2000 MG: at 09:54

## 2022-10-20 RX ADMIN — PROPOFOL 30 MG: 10 INJECTION, EMULSION INTRAVENOUS at 10:00

## 2022-10-20 RX ADMIN — OXYCODONE 10 MG: 5 TABLET ORAL at 10:56

## 2022-10-20 RX ADMIN — LIDOCAINE HYDROCHLORIDE 30 MG: 5 INJECTION, SOLUTION INFILTRATION at 09:59

## 2022-10-20 RX ADMIN — SODIUM CHLORIDE, SODIUM LACTATE, POTASSIUM CHLORIDE, AND CALCIUM CHLORIDE: 600; 310; 30; 20 INJECTION, SOLUTION INTRAVENOUS at 09:52

## 2022-10-20 RX ADMIN — DEXAMETHASONE SODIUM PHOSPHATE 10 MG: 10 INJECTION INTRAMUSCULAR; INTRAVENOUS at 10:23

## 2022-10-20 ASSESSMENT — PAIN SCALES - GENERAL
PAINLEVEL_OUTOF10: 4
PAINLEVEL_OUTOF10: 7

## 2022-10-20 ASSESSMENT — PAIN DESCRIPTION - LOCATION
LOCATION: HAND
LOCATION: HAND

## 2022-10-20 ASSESSMENT — PAIN DESCRIPTION - DESCRIPTORS
DESCRIPTORS: SORE;ACHING
DESCRIPTORS: SORE

## 2022-10-20 NOTE — ANESTHESIA POSTPROCEDURE EVALUATION
Department of Anesthesiology  Postprocedure Note    Patient: Meg Jimenez  MRN: [id number]  YOB: 1960  Date of evaluation: 10/20/2022      Procedure Summary     Date: 10/20/22 Room / Location: [facility] OP OR 07 / [facility]    Anesthesia Start: 0485 Anesthesia Stop: 1042    Procedure: mass excision from the left wrist (Left: Hand) Diagnosis:       Mass of joint of right wrist      (Mass of joint of right wrist [M25.831])    Surgeons: Hernan Chowdary MD Responsible Provider: Lázaro Brasher MD    Anesthesia Type: Deep block ASA Status: 2          Anesthesia Type: No value filed.     Olga Phase I: Olga Score: 8    Olga Phase II:        Anesthesia Post Evaluation    Patient location during evaluation: PACU  Patient participation: complete - patient participated  Level of consciousness: awake and alert  Airway patency: patent  Nausea & Vomiting: no nausea  Cardiovascular status: hemodynamically stable  Respiratory status: acceptable  Hydration status: euvolemic

## 2022-10-20 NOTE — PERIOP NOTE
Pt received from OR on NC tolerating RA. Medicated for pain x1 + effect. Sister at bedside, discharge instructions reviewed and questions answered. VSS. No further needs at this time.

## 2022-10-20 NOTE — PERIOP NOTE
Deep block - let arm wrapped. Tourniquet up to 250.  30 cc of 0.5% lidocaine injected. IV removed.   All without any difficulty

## 2022-10-20 NOTE — ANESTHESIA PRE PROCEDURE
Department of Anesthesiology  Preprocedure Note       Name:  Gerry Menendez   Age:  57 y.o.  :  1960                                          MRN:  [id number]         Date:  10/20/2022      Surgeon: Marion Smiley):  Isela Maki MD    Procedure: Procedure(s):  mass excision from the left wrist    Medications prior to admission:   Prior to Admission medications    Medication Sig Start Date End Date Taking? Authorizing Provider   Cholecalciferol (VITAMIN D) 50 MCG (2000) CAPS capsule Take by mouth nightly   Yes Historical Provider, MD   traMADol (ULTRAM) 50 MG tablet Take 1 tablet by mouth every 4 hours as needed for Pain for up to 5 days. 10/20/22 10/25/22  CARIN Sams - CNP   famotidine (PEPCID) 40 MG tablet TAKE 1 TABLET BY MOUTH EVERY DAY 22   Historical Provider, MD   acetaminophen (TYLENOL) 500 MG tablet Take 1,000 mg by mouth every 8 hours as needed  Patient not taking: No sig reported    Ar Automatic Reconciliation   amLODIPine (NORVASC) 5 MG tablet Take 5 mg by mouth daily 3/10/20   Ar Automatic Reconciliation   ascorbic acid (VITAMIN C) 500 MG tablet Take by mouth nightly    Ar Automatic Reconciliation   diphenhydrAMINE (BENADRYL) 25 MG capsule Take 25 mg by mouth nightly as needed allergies    Ar Automatic Reconciliation   naproxen (NAPROSYN) 500 MG tablet Take 500 mg by mouth 2 times daily (with meals)    Ar Automatic Reconciliation   omeprazole (PRILOSEC) 40 MG delayed release capsule Take 1 capsule by mouth nightly 19   Ar Automatic Reconciliation   tamsulosin (FLOMAX) 0.4 MG capsule Take 0.4 mg by mouth nightly 19   Ar Automatic Reconciliation   tiZANidine (ZANAFLEX) 4 MG tablet Take 4 mg by mouth 3 times daily as needed  Patient not taking: No sig reported 19   Ar Automatic Reconciliation   zolpidem (AMBIEN) 5 MG tablet Take by mouth.     Ar Automatic Reconciliation       Current medications:    Current Facility-Administered Medications   Medication Dose Route Frequency Provider Last Rate Last Admin    acetaminophen (TYLENOL) tablet 1,000 mg  1,000 mg Oral Once Nicho Benson MD        fentaNYL (SUBLIMAZE) injection 100 mcg  100 mcg IntraVENous Once PRN Nicho Benson MD        midazolam PF (VERSED) injection 2 mg  2 mg IntraVENous Once PRN Nicho Benson MD        ceFAZolin (ANCEF) 2000 mg in sterile water 20 mL IV syringe  2,000 mg IntraVENous On Call to [address], APRN - CNP        sodium chloride flush 0.9 % injection 5-40 mL  5-40 mL IntraVENous 2 times per day Marisela Russo APRN - CNP        sodium chloride flush 0.9 % injection 5-40 mL  5-40 mL IntraVENous PRN CARIN Tena - CNP           Allergies:     Allergies   Allergen Reactions    Sulfa Antibiotics Other (See Comments)     \" muscle cramps\"       Problem List:    Patient Active Problem List   Diagnosis Code    Essential hypertension I10    GERD (gastroesophageal reflux disease) K21.9    Cervical myelopathy (HCC) G95.9    Mass of joint of right wrist M25.831    Trigger thumb of right hand M65.311       Past Medical History:        Diagnosis Date    Anemia     pt denies    GERD (gastroesophageal reflux disease)     controlled with med    History of kidney stones 2018    X1 with surgical intervention    History of shingles 2016    has residual outbreaks R eye    Kidney stone onset 2018    MSSA (methicillin susceptible Staphylococcus aureus) 10/23/2019    postive nasal swab       Past Surgical History:        Procedure Laterality Date    CATARACT REMOVAL Right     CERVICAL FUSION  10/31/2019    C3-7 posterior cervical fusion for spinal cord compression Dr. Immanuel Milan, LAPAROSCOPIC  2010    COLONOSCOPY      KNEE ARTHROSCOPY Bilateral early 2000    L and R knee scope    LITHOTRIPSY  11/2018,10/4/2019    NEUROLOGICAL SURGERY  1996    lumber - for herniated disc    TONSILLECTOMY      WISDOM TOOTH EXTRACTION         Social History:    Social History Tobacco Use    Smoking status: Never    Smokeless tobacco: Never   Substance Use Topics    Alcohol use: No                                Counseling given: Not Answered      Vital Signs (Current):   Vitals:    10/17/22 1450 10/20/22 0831   BP:  (!) 155/83   Pulse:  86   Resp:  18   Temp:  98.6 °F (37 °C)   TempSrc:  Oral   SpO2:  99%   Weight: 178 lb (80.7 kg) 178 lb (80.7 kg)   Height: 5' 8\" (1.727 m)                                               BP Readings from Last 3 Encounters:   10/20/22 (!) 155/83       NPO Status: Time of last liquid consumption: 2300                        Time of last solid consumption: 2300                        Date of last liquid consumption: 10/19/22                        Date of last solid food consumption: 10/19/22    BMI:   Wt Readings from Last 3 Encounters:   10/20/22 178 lb (80.7 kg)     Body mass index is 27.06 kg/m². CBC:   Lab Results   Component Value Date/Time    WBC 7.1 12/01/2020 09:03 AM    RBC 5.14 12/01/2020 09:03 AM    HGB 13.0 12/01/2020 09:03 AM    HCT 42.5 12/01/2020 09:03 AM    MCV 82.7 12/01/2020 09:03 AM    RDW 14.6 12/01/2020 09:03 AM     12/01/2020 09:03 AM       CMP:   Lab Results   Component Value Date/Time     12/01/2020 09:03 AM    K 4.0 12/01/2020 09:03 AM     12/01/2020 09:03 AM    CO2 32 12/01/2020 09:03 AM    BUN 23 12/01/2020 09:03 AM    CREATININE 1.33 12/01/2020 09:03 AM    GFRAA >60 12/01/2020 09:03 AM    GLUCOSE 91 12/01/2020 09:03 AM    CALCIUM 9.4 12/01/2020 09:03 AM       POC Tests: No results for input(s): POCGLU, POCNA, POCK, POCCL, POCBUN, POCHEMO, POCHCT in the last 72 hours.     Coags: No results found for: PROTIME, INR, APTT    HCG (If Applicable): No results found for: PREGTESTUR, PREGSERUM, HCG, HCGQUANT     ABGs: No results found for: PHART, PO2ART, BCQ8FBQ, FLH4FHS, BEART, T9QFBAWA     Type & Screen (If Applicable):  No results found for: LABABO, LABRH    Drug/Infectious Status (If Applicable):  No results found for: HIV, HEPCAB    COVID-19 Screening (If Applicable):   Lab Results   Component Value Date/Time    COVID19 Not Detected 11/27/2020 08:05 AM           Anesthesia Evaluation  Patient summary reviewed and Nursing notes reviewed no history of anesthetic complications:   Airway: Mallampati: I  TM distance: >3 FB   Neck ROM: full  Mouth opening: > = 3 FB   Dental:    (+) partials      Pulmonary:Negative Pulmonary ROS breath sounds clear to auscultation                             Cardiovascular:  Exercise tolerance: good (>4 METS),   (+) hypertension:,         Rhythm: regular  Rate: normal                    Neuro/Psych:   Negative Neuro/Psych ROS              GI/Hepatic/Renal:   (+) GERD:, renal disease: kidney stones,           Endo/Other: Negative Endo/Other ROS                    Abdominal:             Vascular: negative vascular ROS. Other Findings:           Anesthesia Plan      TIVA     ASA 2       Induction: intravenous. Anesthetic plan and risks discussed with patient.                         Daryle Muslim, MD   10/20/2022

## 2022-10-21 NOTE — OP NOTE
ORTHOPAEDIC SURGICAL NOTE        Kajal Arevalo male 57 y.o.  [id number]   10/20/2022     PRE-OP DIAGNOSIS: Mass of joint of right wrist [M25.831]   POST-OP DIAGNOSIS: Same  LATERALITY: Left     PROCEDURES PERFORMED:   Radical excision of right wrist extensor compartment synovium       SURGEON:   Mark Horn MD, PhD     IMPLANTS:   * No implants in log *   Procedure(s):  mass excision from the left wrist   Surgeon(s):  Mellissa Bedoya MD   Procedure(s):  mass excision from the left wrist     ANESTHESIA: Deep Block     STAFF:    Circulator: Mordechai Habermann, RN  Scrub Person First: Adi Joya     ESTIMATED BLOOD LOSS: Minimal       TOTAL IV FLUIDS : See anesthesia note    COMPLICATIONS: None     DRAINS:       TOURNIQUET TIME:   Total Tourniquet Time Documented:  Arm  (Left) - 35 minutes  Total: Arm  (Left) - 35 minutes       INDICATION FOR PROCEDURE:     Kajal Arevalo has a longstanding mass on the dorsal aspect of the right wrist.  Surgical and non-surgical treatment options were discussed with the patient and their family, as well as the risk and benefits of each option. After thorough discussion, the patient decided to proceed with surgical management. Specific to this treatment plan, we discussed in detail surgical risks including scar, pain, bleeding, infection, anesthetic risks, neurovascular injury, failure to achieve desired results, hardware problems, need for further surgery,  weakness, stiffness, risk of death and potential risk of other unforseen complication. The Patient consented to the procedure after discussion of the risks and benefits. DESCRIPTION OF PROCEDURE:     The patient was identified in the holding room. The right wrist was marked and confirmed as the correct operative site. They were then brought to the OR and transferred onto the OR table in the supine position. All bony prominences were well padded. SCDs were placed on the bilateral legs throughout the case. A timeout was performed, verifying the correct patient, the correct side  and the correct procedure. Antibiotics were then administered, and were redosed during the procedure as needed at indicated intervals. A non-sterile tourniquet was placed on the arm. The upper extremity was pre scrubbed and then prepped and draped in routine sterile fashion. An incisional timeout was performed re-confirming the correct patient, surgical site and procedure, as well as verifying antibiotics. A straight incision was made on the dorsal aspect of the right wrist, blunt dissection was carried down to the mass, the extensor tendons traveled through the mass, after thorough blunt dissection this was deemed to be profuse synovitis of the extensor tendon rather than a distinct ganglion cyst, thorough synovectomy of the dorsal extensor compartments was then performed sharply with scissors, this was an excisional debridement, all material was sent to pathology for evaluation. The tourniquet was deflated and hemostasis was ensured. The wounds were then thoroughly irrigated and closed in layered fashion with 4-0 Vicryl and 4-0 Stratafix. A sterile dressing was applied followed by a  compressive dressing     The patient was awakened and taken to PACU in stable condition. All sponge and needle counts were correct at the end of the case. I was present and scrubbed for the entire procedure. DISPOSITION:    The patient will be discharged home. Weightbearing status: WBAT       CHEMICAL VTE (DVT) PROPHYLAXIS: None warranted for this case     FOLLOW-UP:  10-14 days for wound check and XRs if needed.      Betty Asif MD    10/21/22  8:22 AM

## 2022-11-01 ENCOUNTER — OFFICE VISIT (OUTPATIENT)
Dept: ORTHOPEDIC SURGERY | Age: 62
End: 2022-11-01

## 2022-11-01 DIAGNOSIS — M65.832 EXTENSOR TENOSYNOVITIS OF LEFT WRIST: ICD-10-CM

## 2022-11-01 DIAGNOSIS — M25.832 MASS OF JOINT OF LEFT WRIST: Primary | ICD-10-CM

## 2022-11-01 PROBLEM — R22.32 MASS OF WRIST, LEFT: Status: ACTIVE | Noted: 2022-09-30

## 2022-11-01 PROBLEM — M65.932 EXTENSOR TENOSYNOVITIS OF LEFT WRIST: Status: ACTIVE | Noted: 2022-11-01

## 2022-11-01 PROCEDURE — 99024 POSTOP FOLLOW-UP VISIT: CPT | Performed by: NURSE PRACTITIONER

## 2022-11-01 RX ORDER — METHYLPREDNISOLONE 4 MG/1
TABLET ORAL
Qty: 1 KIT | Refills: 0 | Status: SHIPPED | OUTPATIENT
Start: 2022-11-01

## 2022-11-01 NOTE — LETTER
DME Patient Authorization Form    Name: Analia Colon  : 1960  MRN: [id number]   Age: 57 y.o. Gender: male  Delivery Address: [facility]     Diagnosis:     ICD-10-CM    1. Mass of joint of left wrist  M25.832            Requested DME:  Reparel Wrist **AWRST ($30) X 1 - left        Clinical Notes:     **Indicates non-covered items by insurance. Payment expected on date of service. Electronically signed by  Provider: CARIN Kitchen CNP__Date: 2022                            [address] Tax ID # [id number]        Durable Medical Equipment and/or Orthotics Patient Consent     I understand that my physician has prescribed this medical supply as part of my treatment plan as a matter of Medical Necessity.  I understand that I have a choice in where I receive my prescribed orthopedic supplies and/or services.  I authorize [facility] to furnish this service/product and to provide my insurance carrier with any information requested in order to process for payment.  I instruct my insurance carrier to pay [facility] directly for these services/products.  I understand that my insurance carrier may deny payment for this supply because it is a non-covered item, deemed not medically necessary or considered experimental.   I understand that any cost not covered by my insurance carrier will be solely my financial responsibility.  I have received the Supplier Standards and have reviewed them.  I have received the prescribed item and have been fully instructed on the proper use of the above services/products.    ______ (Patient Initials) I understand that all DME items are non-returnable after being dispensed. Items still in sealed packaging may be returned up to 14 days after purchasing.  [address] will replace items that are defective.    ______ (Patient Initials) I understand that [facility] will not file a claim with my insurance carrier for this service/product and I am waiving my right to file a claim on my own for this service/product with my insurance company as this item is NON-COVERED (Denoted by the **) by my Insurance company/policy. ______ (Patient Initials) I understand that I am responsible to bring my equipment to the hospital for any surgery. ______________________________________________  ________________________  Patient / Cordelia Jorge            Thank you for considering [address] Your physician has prescribed specific medical equipment or devices for your home use. The following describes your rights and responsibilities as our customer. Right to Choose Providers: You have a choice regarding which company supplies your home medical equipment and devices, and to consult your physician in this decision. You may choose a medical supply store, a home medical equipment provider, or a specialist such as [facility]. [facility] will coordinate with your physician to provide the medical equipment or devices prescribed for your home use. Right to Service:  You have the right to considerate, respectful and nondiscriminatory care. You have the right to receive accurate and easily understood information about your health care. If you speak a foreign language, or don't understand the discussions, assistance will be provided to allow you to make informed health care decisions. You have the right to know your treatment options and to participate in decisions about your care, including the right to accept or refuse treatment. You have the right to expect a reasonable response to your requests for treatment or service.   You have the right to talk in confidence with health care providers and to have your health care information protected. You have the right to receive an explanation of your bill. You have the right to complain about the service or product you receive. Patient Responsibilities:  Please provide complete and accurate information about your health insurance benefits and make arrangements for the timely payment of your bill. [facility] will, if possible, assume responsibility for billing your insurance (Medicare, Medicaid and commercial) for the prescribed equipment or devices. If your policy does not cover the prescribed product, or only covers a portion of the bill, you are responsible for any remaining balance. Return and Exchange Policy:  [facility] will honor published  Warranties for products. [facility] will accept returns or exchanges within 14 days from the date of receipt, providin) the product must be in new condition; 2) receipt as required; and 3) used disposable and hygiene products may only be returned due to a defective product. Note: Refunds will be issued in a timely manner, please allow 4-6 weeks for processing. Complaint Procedures and DME Consumer Protection Resources:  [facility] values you as a customer, and is committed to resolving patient concerns. This commitment includes understanding and documenting your concerns, conducting a review of internal procedures, and providing you with an explanation and resolution to your concerns. Should you have any questions about our services or billing process, please contact our office at (practice phone number). If we are unable to resolve the concern, you have the right to direct comments to the office of Consumer Protection, in the [address] or the [facility] office, without fear of repercussion. DMEPOS SUPPLIER STANDARDS    A supplier must be in compliance with all applicable Federal and [employer] and regulatory requirements.   A supplier must provide complete and accurate information on the DMEPOS supplier application. Any changes to this information must be reported to the [facility] within 30 days. An authorized individual (one whose signature is binding) must sign the application for billing privileges. A supplier must fill orders from its own inventory, or must contract with other companies for the purchase of items necessary to fill the order. A supplier may not contract with any entity that is currently excluded from the Medicare program, any [facility] program, or from any other Federal procurement or Nonprocurement programs. A supplier must advise beneficiaries that they may rent or purchase inexpensive or routinely purchased durable medical equipment, and of the purchase option for capped rental equipment. A supplier must notify beneficiaries of warranty coverage and honor all warranties under applicable State Law, and repair or replace free of charge Medicare covered items that are under warranty. A supplier must maintain a physical facility on an appropriate site. A supplier must permit CMS, or its agents to conduct on-site inspections to ascertain the supplier's compliance with these standards. The supplier location must be accessible to beneficiaries during reasonable business hours, and must maintain a visible sign and posted hours of operation. A supplier must maintain a primary business telephone listed under the name of the business in a [employer] or a toll free number available through directory assistance. The exclusive use of a beeper, answering machine or cell phone is prohibited. A supplier must have comprehensive liability insurance in the amount of at least $300,000 that covers both the supplier's place of business and all customers and employees of the supplier. If the supplier manufactures its own items, this insurance must also cover product liability and completed operations.   A supplier must agree not to initiate telephone contact with beneficiaries, with a few exceptions allowed. This standard prohibits suppliers from calling beneficiaries in order to solicit new business. A supplier is responsible for delivery and must instruct beneficiaries on use of Medicare covered items, and maintain proof of delivery. A supplier must answer questions, and respond to complaints of the beneficiaries, and maintain documentation of such contacts. A supplier must maintain and replace at no charge or repair directly, or through a service contract with another company, Medicare covered items it has rented to beneficiaries. A supplier must accept returns of substandard (less than full quality for the particular item) or unsuitable items (inappropriate for the beneficiary at the time it was fitted and rented or sold) from beneficiaries. A supplier must disclose these supplier standards to each beneficiary to whom it supplies a Medicare-covered item. A supplier must disclose to the government any person having ownership, financial, or control interest in the supplier. A supplier must not convey or reassign a supplier number; i.e., the supplier may not sell or allow another entity to use its Medicare billing number. A supplier must have a complaint resolution protocol established to address beneficiary complaints that relate to these standards. A record of these complaints must be maintained at the physical facility. Complaint records must include: the name, address, telephone number and health insurance claim number of the beneficiary, a summary of the complaint, and any action taken to resolve it. A supplier must agree to furnish CMS any information required by the Medicare statute and implementing regulations. A supplier of DMEPOS and other items and services must be accredited by a CMS-approved accreditation organization in order to receive and retain a supplier billing number.  The accreditation must indicate the specific products and services, for which the supplier is accredited in order for the supplier to receive payment for those specific products and services. A DMEPOS supplier must notify their accreditation organization when a new DMEPOS location is opened. The accreditation organization may accredit the new supplier location for three months after it is operational without requiring a new site visit. All DMEPOS supplier locations, whether owned or subcontracted, must meet the [employer] and be separately accredited in order to bill Medicare. An accredited supplier may be denied enrollment or their enrollment may be revoked, if CMS determines that they are not in compliance with the DMEPOS quality standards. A DMEPOS supplier must disclose upon enrollment all products and services, including the addition of new product lines for which they are seeking accreditation. If a new product line is added after enrollment, the DMEPOS supplier will be responsible for notifying the accrediting body of the new product so that the DMEPOS supplier can be re-surveyed and accredited for these new products. Must meet the surety bond requirements specified in 42 C. F.R. 424.57(c). Implementation date- May 4, 2009. A supplier must obtain oxygen from a state-licensed oxygen supplier. A supplier must maintain ordering and referring documentation consistent with provisions found in 42 C. F.R. 424.516(f). DMEPOS suppliers are prohibited from sharing a practice location with certain other Medicare providers and suppliers. DMEPOS suppliers must remain open to the public for a minimum of 30 hours per week with certain exceptions.

## 2022-11-01 NOTE — PROGRESS NOTES
Orthopaedic Hand Surgery Note    Name: Hussain Delgado  YOB: 1960  Gender: male  MRN: [id number]    Post Operative Visit: mass excision from the left wrist - Left    HPI: Patient is status post mass excision from the left wrist - Left on 10/20/2022. Doing well. Pleased with current progress. Denies fevers or chills. Patient is having some swelling under his incision. He said he feels very tight. Physical Examination:  Wound healing well. Sensation is intact in all fingers. Patient has full flexion of his fingers. He is unable to make a composite fist.  He does have a fair amount of swelling under his incision. There is no erythema or drainage. He denies paresthesia. He has good capillary refill. Imaging:     None    Assessment:   1. Mass of joint of left wrist         Status post mass excision from the left wrist - Left on 10/20/2022    Plan:  We discussed the post operative course and progression. We will put him in a compressive wrist sleeve. We will work with therapy today. He is on an anti-inflammatory. We will give him a Medrol Dosepak. We will reassess his progress back in 2 weeks.     CARIN Richardson CNP  11/01/22  10:28 AM

## 2022-11-04 ENCOUNTER — TELEPHONE (OUTPATIENT)
Dept: ORTHOPEDIC SURGERY | Age: 62
End: 2022-11-04

## 2022-11-04 NOTE — TELEPHONE ENCOUNTER
I returned patient call- no answer. I have left another message. If he calls back the appointment may be rescheduled. This is not his first post-op appointment, just a 2 week recheck.

## 2022-11-04 NOTE — TELEPHONE ENCOUNTER
I returned patient call to reschedule his 2 week recheck. No answer I left a message for him to call back.

## 2022-11-07 NOTE — PROGRESS NOTES
The patient was prescribed and fitted with a Reparel wrist sleeve for the left wrist, size large. Patient read and signed documenting they understand and agree to [facility]'s current DME return policy.

## 2022-11-15 ENCOUNTER — OFFICE VISIT (OUTPATIENT)
Dept: ORTHOPEDIC SURGERY | Age: 62
End: 2022-11-15

## 2022-11-15 DIAGNOSIS — M65.832 EXTENSOR TENOSYNOVITIS OF LEFT WRIST: ICD-10-CM

## 2022-11-15 DIAGNOSIS — M25.832 MASS OF JOINT OF LEFT WRIST: Primary | ICD-10-CM

## 2022-11-15 PROCEDURE — 99024 POSTOP FOLLOW-UP VISIT: CPT | Performed by: NURSE PRACTITIONER

## 2022-11-15 NOTE — PROGRESS NOTES
Orthopaedic Hand Clinic Note    Name: Jake Bravo  YOB: 1960  Gender: male  MRN: [id number]      Follow up visit:   1. Mass of joint of left wrist    2. Extensor tenosynovitis of left wrist        HPI: Jake Bravo is a 57 y.o. male who is following up for mass excision left hand. He is 4 weeks out from surgery. He is doing much better. ROS/Meds/PSH/PMH/FH/SH: I personally reviewed the patients standard intake form. Pertinents are discussed in the HPI    Physical Examination:    Musculoskeletal Examination:  Examination on the left upper extremity demonstrates cap refill < 5 seconds in all fingers,   Patient has a well-healed incision to the left hand. There is minimal swelling. He is able to make a composite fist.  He has full extension of his digits. He denies paresthesia. He has good capillary refill. Imaging / Electrodiagnostic Tests:     None    Assessment:     ICD-10-CM    1. Mass of joint of left wrist  M25.832       2. Extensor tenosynovitis of left wrist  [id number]           Plan:   We discussed the diagnosis and different treatment options. We discussed observation, therapy, antiinflammatory medications and other pertinent treatment modalities. After discussing in detail the patient elects to proceed with continuing activities as tolerated. We will see him in 6 weeks. I told him he could cancel that appointment if he was doing well. .     Patient voiced accordance and understanding of the information provided and the formulated plan. All questions were answered to the patient's satisfaction during the encounter. Treatment at this time:  Compressive sleeve and activities as tolerated.     CARIN Gu - CNP  Orthopaedic Surgery  11/15/22  8:29 AM

## 2023-01-04 ENCOUNTER — OFFICE VISIT (OUTPATIENT)
Dept: ORTHOPEDIC SURGERY | Age: 63
End: 2023-01-04

## 2023-01-04 DIAGNOSIS — M65.832 EXTENSOR TENOSYNOVITIS OF LEFT WRIST: ICD-10-CM

## 2023-01-04 DIAGNOSIS — M25.832 MASS OF JOINT OF LEFT WRIST: Primary | ICD-10-CM

## 2023-01-04 PROCEDURE — 99024 POSTOP FOLLOW-UP VISIT: CPT | Performed by: NURSE PRACTITIONER

## 2023-01-04 NOTE — PROGRESS NOTES
Orthopaedic Hand Clinic Note    Name: Kendrick Rodríguez  YOB: 1960  Gender: male  MRN: [id number]      Follow up visit:   1. Mass of joint of left wrist    2. Extensor tenosynovitis of left wrist        HPI: Kendrick Rodríguez is a 57 y.o. male who is following up for left wrist mass excision. He is 2-1/2 months out from surgery. He says he is doing well. He says sometimes he still feels a little tightness when making a fist.  He has no pain. ROS/Meds/PSH/PMH/FH/SH: I personally reviewed the patients standard intake form. Pertinents are discussed in the HPI    Physical Examination:    Musculoskeletal Examination:  Examination on the left upper extremity demonstrates cap refill < 5 seconds in all fingers,  Patient has a well-healed incision with some keloiding on the dorsal aspect of the left hand. He is able to make a composite fist.  He is nontender over his scar. He denies paresthesia. He has good capillary refill. Imaging / Electrodiagnostic Tests:     none    Assessment:     ICD-10-CM    1. Mass of joint of left wrist  M25.832       2. Extensor tenosynovitis of left wrist  [id number]           Plan:   We discussed the diagnosis and different treatment options. We discussed observation, therapy, antiinflammatory medications and other pertinent treatment modalities. After discussing in detail the patient elects to proceed with continuing activities as tolerated. We will see him back as needed. .     Patient voiced accordance and understanding of the information provided and the formulated plan. All questions were answered to the patient's satisfaction during the encounter.     Treatment at this time:  Activities as tolerated    CARIN Vaca CNP  Orthopaedic Surgery  01/04/23  9:30 AM

## 2023-01-16 ENCOUNTER — OFFICE VISIT (OUTPATIENT)
Dept: ORTHOPEDIC SURGERY | Age: 63
End: 2023-01-16
Payer: COMMERCIAL

## 2023-01-16 DIAGNOSIS — M65.341 TRIGGER FINGER, RIGHT RING FINGER: Primary | ICD-10-CM

## 2023-01-16 PROCEDURE — 20550 NJX 1 TENDON SHEATH/LIGAMENT: CPT | Performed by: NURSE PRACTITIONER

## 2023-01-16 PROCEDURE — 99214 OFFICE O/P EST MOD 30 MIN: CPT | Performed by: NURSE PRACTITIONER

## 2023-01-16 RX ORDER — MELOXICAM 15 MG/1
15 TABLET ORAL DAILY
Qty: 28 TABLET | Refills: 0 | Status: SHIPPED | OUTPATIENT
Start: 2023-01-16 | End: 2023-02-13

## 2023-01-16 RX ORDER — METHYLPREDNISOLONE ACETATE 40 MG/ML
40 INJECTION, SUSPENSION INTRA-ARTICULAR; INTRALESIONAL; INTRAMUSCULAR; SOFT TISSUE ONCE
Status: COMPLETED | OUTPATIENT
Start: 2023-01-16 | End: 2023-01-16

## 2023-01-16 RX ADMIN — METHYLPREDNISOLONE ACETATE 40 MG: 40 INJECTION, SUSPENSION INTRA-ARTICULAR; INTRALESIONAL; INTRAMUSCULAR; SOFT TISSUE at 10:33

## 2023-01-16 NOTE — PROGRESS NOTES
Orthopaedic Hand Surgery Note    Name: Charlee Pittman  YOB: 1960  Gender: male  MRN: [id number]    CC: Right ring finger pain, locking, clicking    HPI: Patient is a 57 y.o. male with a chief complaint of right ring finger clicking, locking and pain. The symptoms have been going on for 3 weeks. ROS/Meds/PSH/PMH/FH/SH: I personally reviewed the patients standard intake form. Pertinents are discussed in the HPI    Physical Examination:  Musculoskeletal:   Examination on the right demonstrates Normal sensation to light touch in the median distribution, normal sensation in ulnar and radial distribution, negative carpal tunnel compression testing and Phalen testing. positive tenderness of the right ring A1 pulley with palpable clicking and positive  locking. The extensor tendons all track well over the MCP joints. Imaging / Electrodiagnostic Tests:     None    Assessment:     ICD-10-CM    1. Trigger finger, right ring finger  M65.341 methylPREDNISolone acetate (DEPO-MEDROL) injection 40 mg     meloxicam (MOBIC) 15 MG tablet     NH INJECTION 1 TENDON SHEATH/LIGAMENT APONEUROSIS          Plan:  We discussed the diagnosis and different treatment options. We discussed observation, splinting, cortisone injections and surgical release of the A1 pulley. We discussed that stenosing tenosynovitis at the level of the A1 pulley AKA trigger finger is a chronic condition regardless of how long the symptoms have been present, this most likely has been progressing for much longer than the symptoms were evident and it will likely persist for a long time without medical treatment. Furthermore, the many patients require surgical trigger finger release at some point despite some short-term benefits of conservative treatment. After discussing in detail the patient elects to proceed with right ring trigger finger injection. I will will give him 1 month of Mobic.   I will see him back in 2 months if his symptoms persist.    Procedure Note    The risk, benefits and alternatives of injection and no injection therapy were discussed. Risks including skin blanching, subcutaneous fat atrophy, and elevations in blood glucose levels were discussed. The patient consented for an injection. The patient has been identified by name and birthdate. The injection site was identified, marked and prepped with a alcohol swab. Time out completed. The A1 pulley of the right RING finger was/were injected with a 25 gauge needle with 1ml of 40mg/ml Depomedrol and 1ml xylocaine plain 1%. The injection site was then dressed with a bandaid. The patient tolerated the injection well. The patient was instructed to monitor their blood sugars if diabetic and call if any concerns. The patient was instructed to call the office if any adverse local effects occurred or any if any questions or concerns arise. Patient voiced accordance and understanding of the information provided and the formulated plan. All questions were answered to the patient's satisfaction during the encounter. 4 This is a chronic illness with exacerbation, progression, or side effect of treatment  Treatment at this time: Prescription medication and Minor procedure: cortisone injection. I discussed the risk of skin blanching and hyperglycemia. I discussed the symptoms of hyperglycemia such as confusion, lethargy, polyuria and polydipsia. If any of these symptoms occur the patient is to present to an urgent care facility or primary care doctor for blood sugar evaluation.     CARIN Nevarez - CNP  Orthopaedic Surgery  01/16/23  10:13 AM

## 2023-03-17 ENCOUNTER — OFFICE VISIT (OUTPATIENT)
Dept: ORTHOPEDIC SURGERY | Age: 63
End: 2023-03-17

## 2023-03-17 DIAGNOSIS — M25.832 MASS OF JOINT OF LEFT WRIST: ICD-10-CM

## 2023-03-17 DIAGNOSIS — G56.01 RIGHT CARPAL TUNNEL SYNDROME: ICD-10-CM

## 2023-03-17 DIAGNOSIS — M65.341 TRIGGER FINGER, RIGHT RING FINGER: Primary | ICD-10-CM

## 2023-03-17 PROBLEM — G56.02 LEFT CARPAL TUNNEL SYNDROME: Status: ACTIVE | Noted: 2023-03-17

## 2023-03-17 RX ORDER — BETAMETHASONE SODIUM PHOSPHATE AND BETAMETHASONE ACETATE 3; 3 MG/ML; MG/ML
6 INJECTION, SUSPENSION INTRA-ARTICULAR; INTRALESIONAL; INTRAMUSCULAR; SOFT TISSUE ONCE
Status: COMPLETED | OUTPATIENT
Start: 2023-03-17 | End: 2023-03-17

## 2023-03-17 RX ADMIN — BETAMETHASONE SODIUM PHOSPHATE AND BETAMETHASONE ACETATE 6 MG: 3; 3 INJECTION, SUSPENSION INTRA-ARTICULAR; INTRALESIONAL; INTRAMUSCULAR; SOFT TISSUE at 13:49

## 2023-03-17 NOTE — PROGRESS NOTES
Orthopaedic Hand Clinic Note    Name: Royce Higginbotham  YOB: 1960  Gender: male  MRN: [id number]      Follow up visit:   1. Trigger finger, right ring finger    2. Mass of joint of left wrist    3. Right carpal tunnel syndrome        HPI: Royce Higginbotham is a 57 y.o. male who is following up for left wrist mass excision and right ring trigger finger injection. He is 5 months out from surgery. He said he is doing well. 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 He said he has had some swelling that concerned him. He has been wearing his compressive wrist sleeve. He also complains of his right hand going to sleep. He said he wakes up and it is numb. He said he has been wearing a brace at night which has helped. ROS/Meds/PSH/PMH/FH/SH: I personally reviewed the patients standard intake form. Pertinents are discussed in the HPI    Physical Examination:    Musculoskeletal Examination:  Examination on the left upper extremity demonstrates cap refill < 5 seconds in all fingers  Patient has a well-healed incision over the dorsal aspect of the left wrist.  He does have some keloid scarring. He is nontender to palpation. There is mild swelling. Examination of the right upper extremity demonstrates Decreased sensation to light touch in the median distribution, normal sensation in ulnar and radial distribution, positive carpal tunnel compression testing and Phalen testing, cap refill < 5 seconds in all fingers. Inspection reveals no thenar atrophy. Negative Tinel and elbow flexion compression test of the cubital tunnel, negative Tinel over Guyon's canal. Sensation to light touch in the ulnar 2 digits is normal with no intrinsic atrophy/weakness. No tenderness to palpation or masses noted in the forearm. Imaging / Electrodiagnostic Tests:     None    Assessment:     ICD-10-CM    1. Trigger finger, right ring finger  M65.341       2.  Mass of joint of left wrist  M25.832 3. Right carpal tunnel syndrome  G56.01           Plan:   We discussed the diagnosis and different treatment options. We discussed observation, therapy, antiinflammatory medications and other pertinent treatment modalities. After discussing in detail the patient elects to proceed with continuing with his compressive wrist sleeve on the left. He will ice after activities. As far as his right carpal tunnel syndrome is concerned he would like to proceed with an injection. He will continue with his braces at night. We discussed surgical intervention if the injections for carpal tunnel did not work. Procedure Note    The risk, benefits and alternatives of injection and no injection therapy were discussed. Risks including skin blanching, subcutaneous fat atrophy, and elevations in blood glucose levels were discussed. The patient consented for an injection. The patient has been identified by name and birthdate. The injection site was identified, marked and prepped with alcohol swabs. Time out completed. The right carpal tunnel was injected with 1ml of 6mg/ml Celestone and 1ml Lidocaine plain 1%. The injection site was then dressed with a bandaid. The patient tolerated the injection well. The patient was instructed to monitor their blood sugars if diabetic and call if any concerns. The patient was instructed to call the office if any adverse local effects occurred or any if any questions or concerns arise. Patient voiced accordance and understanding of the information provided and the formulated plan. All questions were answered to the patient's satisfaction during the encounter. 4 This is a chronic illness with exacerbation, progression, or side effect of treatment  Treatment at this time: Brace and Minor procedure: cortisone injection. I discussed the risk of skin blanching and hyperglycemia. I discussed the symptoms of hyperglycemia such as confusion, lethargy, polyuria and polydipsia.   If any of these symptoms occur the patient is to present to an urgent care facility or primary care doctor for blood sugar evaluation.     CARIN Ferreira CNP  Orthopaedic Surgery  03/17/23  11:24 AM

## 2023-06-28 ENCOUNTER — OFFICE VISIT (OUTPATIENT)
Dept: ORTHOPEDIC SURGERY | Age: 63
End: 2023-06-28
Payer: COMMERCIAL

## 2023-06-28 DIAGNOSIS — M65.341 TRIGGER FINGER, RIGHT RING FINGER: Primary | ICD-10-CM

## 2023-06-28 PROCEDURE — 99214 OFFICE O/P EST MOD 30 MIN: CPT | Performed by: NURSE PRACTITIONER

## 2023-06-28 PROCEDURE — 20550 NJX 1 TENDON SHEATH/LIGAMENT: CPT | Performed by: NURSE PRACTITIONER

## 2023-06-28 RX ORDER — BETAMETHASONE SODIUM PHOSPHATE AND BETAMETHASONE ACETATE 3; 3 MG/ML; MG/ML
6 INJECTION, SUSPENSION INTRA-ARTICULAR; INTRALESIONAL; INTRAMUSCULAR; SOFT TISSUE ONCE
Status: COMPLETED | OUTPATIENT
Start: 2023-06-28 | End: 2023-06-28

## 2023-06-28 RX ADMIN — BETAMETHASONE SODIUM PHOSPHATE AND BETAMETHASONE ACETATE 6 MG: 3; 3 INJECTION, SUSPENSION INTRA-ARTICULAR; INTRALESIONAL; INTRAMUSCULAR; SOFT TISSUE at 09:38

## 2023-09-18 ENCOUNTER — HOSPITAL ENCOUNTER (EMERGENCY)
Age: 63
Discharge: HOME | End: 2023-09-18
Attending: EMERGENCY MEDICINE
Payer: COMMERCIAL

## 2023-09-18 ENCOUNTER — APPOINTMENT (OUTPATIENT)
Dept: CT IMAGING | Age: 63
End: 2023-09-18
Payer: COMMERCIAL

## 2023-09-18 VITALS
RESPIRATION RATE: 16 BRPM | SYSTOLIC BLOOD PRESSURE: 148 MMHG | DIASTOLIC BLOOD PRESSURE: 90 MMHG | BODY MASS INDEX: 26.98 KG/M2 | WEIGHT: 178 LBS | OXYGEN SATURATION: 99 % | HEIGHT: 68 IN | TEMPERATURE: 98 F | HEART RATE: 95 BPM

## 2023-09-18 DIAGNOSIS — N20.0 KIDNEY STONE: Primary | ICD-10-CM

## 2023-09-18 LAB
ALBUMIN SERPL-MCNC: 4.3 G/DL (ref 3.2–4.6)
ALBUMIN/GLOB SERPL: 1.1 (ref 0.4–1.6)
ALP SERPL-CCNC: 95 U/L (ref 50–136)
ALT SERPL-CCNC: 22 U/L (ref 12–65)
ANION GAP SERPL CALC-SCNC: 5 MMOL/L (ref 2–11)
AST SERPL-CCNC: 12 U/L (ref 15–37)
BASOPHILS # BLD: 0.1 K/UL (ref 0–0.2)
BASOPHILS NFR BLD: 1 % (ref 0–2)
BILIRUB SERPL-MCNC: 0.4 MG/DL (ref 0.2–1.1)
BUN SERPL-MCNC: 22 MG/DL (ref 8–23)
CALCIUM SERPL-MCNC: 9.2 MG/DL (ref 8.3–10.4)
CHLORIDE SERPL-SCNC: 104 MMOL/L (ref 101–110)
CO2 SERPL-SCNC: 29 MMOL/L (ref 21–32)
CREAT SERPL-MCNC: 1.2 MG/DL (ref 0.8–1.5)
DIFFERENTIAL METHOD BLD: ABNORMAL
EOSINOPHIL # BLD: 0.2 K/UL (ref 0–0.8)
EOSINOPHIL NFR BLD: 3 % (ref 0.5–7.8)
ERYTHROCYTE [DISTWIDTH] IN BLOOD BY AUTOMATED COUNT: 14.6 % (ref 11.9–14.6)
GLOBULIN SER CALC-MCNC: 3.8 G/DL (ref 2.8–4.5)
GLUCOSE SERPL-MCNC: 101 MG/DL (ref 65–100)
HCT VFR BLD AUTO: 45 % (ref 41.1–50.3)
HGB BLD-MCNC: 14 G/DL (ref 13.6–17.2)
IMM GRANULOCYTES # BLD AUTO: 0 K/UL (ref 0–0.5)
IMM GRANULOCYTES NFR BLD AUTO: 1 % (ref 0–5)
LYMPHOCYTES # BLD: 1.9 K/UL (ref 0.5–4.6)
LYMPHOCYTES NFR BLD: 32 % (ref 13–44)
MCH RBC QN AUTO: 26.4 PG (ref 26.1–32.9)
MCHC RBC AUTO-ENTMCNC: 31.1 G/DL (ref 31.4–35)
MCV RBC AUTO: 84.7 FL (ref 82–102)
MONOCYTES # BLD: 0.6 K/UL (ref 0.1–1.3)
MONOCYTES NFR BLD: 9 % (ref 4–12)
NEUTS SEG # BLD: 3.3 K/UL (ref 1.7–8.2)
NEUTS SEG NFR BLD: 54 % (ref 43–78)
NRBC # BLD: 0 K/UL (ref 0–0.2)
PLATELET # BLD AUTO: 323 K/UL (ref 150–450)
PMV BLD AUTO: 10.7 FL (ref 9.4–12.3)
POTASSIUM SERPL-SCNC: 4 MMOL/L (ref 3.5–5.1)
PROT SERPL-MCNC: 8.1 G/DL (ref 6.3–8.2)
RBC # BLD AUTO: 5.31 M/UL (ref 4.23–5.6)
SODIUM SERPL-SCNC: 138 MMOL/L (ref 133–143)
WBC # BLD AUTO: 6 K/UL (ref 4.3–11.1)

## 2023-09-18 PROCEDURE — 80053 COMPREHEN METABOLIC PANEL: CPT

## 2023-09-18 PROCEDURE — 85025 COMPLETE CBC W/AUTO DIFF WBC: CPT

## 2023-09-18 PROCEDURE — 6360000002 HC RX W HCPCS: Performed by: NURSE PRACTITIONER

## 2023-09-18 PROCEDURE — 74176 CT ABD & PELVIS W/O CONTRAST: CPT

## 2023-09-18 PROCEDURE — 96374 THER/PROPH/DIAG INJ IV PUSH: CPT

## 2023-09-18 PROCEDURE — 99284 EMERGENCY DEPT VISIT MOD MDM: CPT

## 2023-09-18 RX ORDER — HYDROCODONE BITARTRATE AND ACETAMINOPHEN 5; 325 MG/1; MG/1
1 TABLET ORAL EVERY 6 HOURS PRN
Qty: 10 TABLET | Refills: 0 | Status: SHIPPED | OUTPATIENT
Start: 2023-09-18 | End: 2023-09-21

## 2023-09-18 RX ORDER — KETOROLAC TROMETHAMINE 30 MG/ML
15 INJECTION, SOLUTION INTRAMUSCULAR; INTRAVENOUS ONCE
Status: COMPLETED | OUTPATIENT
Start: 2023-09-18 | End: 2023-09-18

## 2023-09-18 RX ADMIN — KETOROLAC TROMETHAMINE 15 MG: 30 INJECTION, SOLUTION INTRAMUSCULAR; INTRAVENOUS at 12:29

## 2023-09-18 ASSESSMENT — PAIN DESCRIPTION - LOCATION
LOCATION: BACK
LOCATION: FLANK

## 2023-09-18 ASSESSMENT — PAIN SCALES - GENERAL
PAINLEVEL_OUTOF10: 8
PAINLEVEL_OUTOF10: 10

## 2023-09-18 ASSESSMENT — PAIN DESCRIPTION - ORIENTATION
ORIENTATION: LEFT
ORIENTATION: LEFT

## 2023-09-18 NOTE — ED PROVIDER NOTES
Emergency Department Provider Note       PCP: Josee Lacey MD   Age: 57 y.o. Sex: male     DISPOSITION Decision To Discharge 09/18/2023 01:24:31 PM       ICD-10-CM    1. Kidney stone  N20.0 HYDROcodone-acetaminophen (NORCO) 5-325 MG per tablet          Medical Decision Making     Complexity of Problems Addressed:  1 or more acute illnesses that pose a threat to life or bodily function. Data Reviewed and Analyzed:   I independently ordered and reviewed each unique test.       I interpreted the CT Scan 8 mm right ureteral stone. Agree with radiologist impression. I interpreted the labs, urinalysis. Discussion of management or test interpretation. 70-year-old male presents emergency department today with complaint of left flank pain. Patient reports history of kidney stones. He appears in no acute distress. No CVA tenderness. No abdominal tenderness. He is afebrile. Will check labs, urinalysis, and CT. No ureteral stones noted on the left side. He does have an 8 mm right ureteral stone noted. Labs unremarkable. Urinalysis without sign of infection. All results discussed with the patient. Encouraged follow-up with urology. Return precautions discussed. Risk of Complications and/or Morbidity of Patient Management:  Prescription drug management performed. Shared medical decision making was utilized in creating the patients health plan today. History      70-year-old male presents emergency department today with complaint of left flank pain that radiates into the left lower abdomen. Pain has been ongoing for several days. He reports a history of kidney stones but states that he typically does not have difficulty passing stones. He feels like a stone may be stuck somewhere. He reports some difficulty with urination. He denies any nausea, vomiting, diarrhea, chest pain, abdominal pain, shortness of breath, or fever. The history is provided by the patient.

## 2023-09-18 NOTE — DISCHARGE INSTRUCTIONS
As we discussed, you have an 8 mm stone on the right side. Please follow-up with urology. Return to the emergency department for any new, worsening, or concerning symptoms.

## 2023-09-19 ENCOUNTER — OFFICE VISIT (OUTPATIENT)
Dept: UROLOGY | Age: 63
End: 2023-09-19
Payer: COMMERCIAL

## 2023-09-19 ENCOUNTER — TELEPHONE (OUTPATIENT)
Dept: UROLOGY | Age: 63
End: 2023-09-19

## 2023-09-19 ENCOUNTER — PREP FOR PROCEDURE (OUTPATIENT)
Dept: UROLOGY | Age: 63
End: 2023-09-19

## 2023-09-19 DIAGNOSIS — N20.0 KIDNEY STONE: Primary | ICD-10-CM

## 2023-09-19 DIAGNOSIS — N20.1 RIGHT URETERAL STONE: ICD-10-CM

## 2023-09-19 DIAGNOSIS — N20.0 KIDNEY STONES: Primary | ICD-10-CM

## 2023-09-19 DIAGNOSIS — N20.0 KIDNEY STONES: ICD-10-CM

## 2023-09-19 LAB
BILIRUBIN, URINE, POC: NEGATIVE
BLOOD URINE, POC: NORMAL
GLUCOSE URINE, POC: NEGATIVE
KETONES, URINE, POC: NEGATIVE
LEUKOCYTE ESTERASE, URINE, POC: NORMAL
NITRITE, URINE, POC: NEGATIVE
PH, URINE, POC: 6 (ref 4.6–8)
PROTEIN,URINE, POC: 30
SPECIFIC GRAVITY, URINE, POC: 1.02 (ref 1–1.03)
URINALYSIS CLARITY, POC: NORMAL
URINALYSIS COLOR, POC: NORMAL
UROBILINOGEN, POC: NORMAL

## 2023-09-19 PROCEDURE — 81003 URINALYSIS AUTO W/O SCOPE: CPT | Performed by: NURSE PRACTITIONER

## 2023-09-19 PROCEDURE — 74018 RADEX ABDOMEN 1 VIEW: CPT | Performed by: NURSE PRACTITIONER

## 2023-09-19 PROCEDURE — 99214 OFFICE O/P EST MOD 30 MIN: CPT | Performed by: NURSE PRACTITIONER

## 2023-09-19 RX ORDER — INDOMETHACIN 50 MG/1
50 CAPSULE ORAL NIGHTLY
Qty: 60 CAPSULE | Refills: 3 | COMMUNITY
Start: 2023-09-19

## 2023-09-19 ASSESSMENT — ENCOUNTER SYMPTOMS
NAUSEA: 0
BACK PAIN: 1

## 2023-09-19 NOTE — PROGRESS NOTES
[facility]  [address]  [phone]          Andrew Brunner  : 1960    Chief Complaint   Patient presents with    Nephrolithiasis          HPI     Andrew Brunner is a 57 y.o. male who is followed for renal stone. Last seen in  by Dr Manuel Maciel. R ESWL in 2018. He has also had previous URS. Back today for ER follow up. He was seen one day ago for L flank pain x 1 weeks. Also reported dark urine. CT urogram showed R renal stones and R proximal ureteral stone w min obstruction. No L sided stones or obstruction. Images reviewed personally. Today he reports no pain, fever/chills, or LUTS. He is on indomethacin for some ongoing R leg pain. Past Medical History:   Diagnosis Date    Anemia     pt denies    GERD (gastroesophageal reflux disease)     controlled with med    History of kidney stones 2018    X1 with surgical intervention    History of shingles 2016    has residual outbreaks R eye    Kidney stone onset     MSSA (methicillin susceptible Staphylococcus aureus) 10/23/2019    postive nasal swab     Past Surgical History:   Procedure Laterality Date    CATARACT REMOVAL Right     CERVICAL FUSION  10/31/2019    C3-7 posterior cervical fusion for spinal cord compression Dr. Melida Wheeler, LAPAROSCOPIC      COLONOSCOPY      HAND SURGERY Left 10/20/2022    mass excision from the left wrist performed by Michael Carrillo MD at [facility]    KNEE ARTHROSCOPY Bilateral early     L and R knee scope    LITHOTRIPSY  2018,10/4/2019    NEUROLOGICAL SURGERY      lumber - for herniated disc    TONSILLECTOMY      WISDOM TOOTH EXTRACTION       Current Outpatient Medications   Medication Sig Dispense Refill    indomethacin (INDOCIN) 50 MG capsule Take 1 capsule by mouth 3 times daily 60 capsule 3    HYDROcodone-acetaminophen (NORCO) 5-325 MG per tablet Take 1 tablet by mouth every 6 hours as needed for Pain for up to 3 days.

## 2023-09-19 NOTE — TELEPHONE ENCOUNTER
Procedures: Procedure(s):   ESWL EXTRACORPOREAL SHOCK WAVE LITHOTRIPSY/ RIGHT   Date: 9/25/2023   Time: 1100   Location: [facility] MAIN OR 10

## 2023-09-20 ENCOUNTER — OFFICE VISIT (OUTPATIENT)
Dept: UROLOGY | Age: 63
End: 2023-09-20
Payer: COMMERCIAL

## 2023-09-20 DIAGNOSIS — N20.1 RIGHT URETERAL STONE: ICD-10-CM

## 2023-09-20 DIAGNOSIS — N20.0 KIDNEY STONES: Primary | ICD-10-CM

## 2023-09-20 PROCEDURE — 74018 RADEX ABDOMEN 1 VIEW: CPT | Performed by: NURSE PRACTITIONER

## 2023-09-20 PROCEDURE — 99214 OFFICE O/P EST MOD 30 MIN: CPT | Performed by: NURSE PRACTITIONER

## 2023-09-20 RX ORDER — NAPROXEN 250 MG/1
250 TABLET ORAL 2 TIMES DAILY PRN
COMMUNITY
End: 2023-09-21

## 2023-09-20 RX ORDER — VALACYCLOVIR HYDROCHLORIDE 1 G/1
1000 TABLET, FILM COATED ORAL DAILY
COMMUNITY
Start: 2023-06-18 | End: 2023-09-21

## 2023-09-20 RX ORDER — TIZANIDINE 2 MG/1
2 TABLET ORAL EVERY 6 HOURS PRN
COMMUNITY
Start: 2023-08-10 | End: 2023-09-21

## 2023-09-20 RX ORDER — CIPROFLOXACIN 500 MG/1
TABLET, FILM COATED ORAL
COMMUNITY
Start: 2023-07-27 | End: 2023-09-21

## 2023-09-20 RX ORDER — ASPIRIN 81 MG
1 TABLET, DELAYED RELEASE (ENTERIC COATED) ORAL DAILY
COMMUNITY

## 2023-09-20 ASSESSMENT — ENCOUNTER SYMPTOMS: BACK PAIN: 0

## 2023-09-20 NOTE — H&P (VIEW-ONLY)
[facility]  [facility], [address] [place] [address]  [phone]          Ilya Adames  : 1960    Chief Complaint   Patient presents with    Follow-up     Passed stone last night, repeating KUB today          HPI     Ilya Adames is a 57 y.o. male who is followed for renal stone. Last seen in  by Dr Soniya Sandy. R ESWL in 2018. He has also had previous URS. Back today for ER follow up. He was seen one day ago for L flank pain x 1 weeks. Also reported dark urine. CT urogram showed R renal stones and R proximal ureteral stone w min obstruction. No L sided stones or obstruction. Images reviewed personally. Stone visualized on KUB one day ago. R ESWL arranged for next week. He was worked back in today for passing stone overnight. He is doing well now. No pain or voiding issues.       Past Medical History:   Diagnosis Date    Anemia     pt denies    GERD (gastroesophageal reflux disease)     controlled with med    History of kidney stones 2018    X1 with surgical intervention    History of shingles 2016    has residual outbreaks R eye    Kidney stone onset     MSSA (methicillin susceptible Staphylococcus aureus) 10/23/2019    postive nasal swab     Past Surgical History:   Procedure Laterality Date    CATARACT REMOVAL Right     CERVICAL FUSION  10/31/2019    C3-7 posterior cervical fusion for spinal cord compression Dr. Veronica Denton, LAPAROSCOPIC      COLONOSCOPY      HAND SURGERY Left 10/20/2022    mass excision from the left wrist performed by Abe Sandy MD at [address] ARTHROSCOPY Bilateral early     L and R knee scope    LITHOTRIPSY  2018,10/4/2019    NEUROLOGICAL SURGERY      lumber - for herniated disc    TONSILLECTOMY      WISDOM TOOTH EXTRACTION       Current Outpatient Medications   Medication Sig Dispense Refill    indomethacin (INDOCIN) 50 MG capsule Take 1 capsule by mouth 3 times daily 60 capsule 3

## 2023-09-20 NOTE — PROGRESS NOTES
[facility]  [address], [address] [place] [address]  [phone]          Yudi West  : 1960    Chief Complaint   Patient presents with    Follow-up     Passed stone last night, repeating KUB today          HPI     Yudi West is a 57 y.o. male who is followed for renal stone. Last seen in  by Dr Brian Galan. R ESWL in 2018. He has also had previous URS. Back today for ER follow up. He was seen one day ago for L flank pain x 1 weeks. Also reported dark urine. CT urogram showed R renal stones and R proximal ureteral stone w min obstruction. No L sided stones or obstruction. Images reviewed personally. Stone visualized on KUB one day ago. R ESWL arranged for next week. He was worked back in today for passing stone overnight. He is doing well now. No pain or voiding issues.       Past Medical History:   Diagnosis Date    Anemia     pt denies    GERD (gastroesophageal reflux disease)     controlled with med    History of kidney stones 2018    X1 with surgical intervention    History of shingles 2016    has residual outbreaks R eye    Kidney stone onset     MSSA (methicillin susceptible Staphylococcus aureus) 10/23/2019    postive nasal swab     Past Surgical History:   Procedure Laterality Date    CATARACT REMOVAL Right     CERVICAL FUSION  10/31/2019    C3-7 posterior cervical fusion for spinal cord compression Dr. Ben Vargas, LAPAROSCOPIC      COLONOSCOPY      HAND SURGERY Left 10/20/2022    mass excision from the left wrist performed by Nadira Tabares MD at [address] [facility] ARTHROSCOPY Bilateral early     L and R knee scope    LITHOTRIPSY  2018,10/4/2019    NEUROLOGICAL SURGERY      lumber - for herniated disc    TONSILLECTOMY      WISDOM TOOTH EXTRACTION       Current Outpatient Medications   Medication Sig Dispense Refill    indomethacin (INDOCIN) 50 MG capsule Take 1 capsule by mouth 3 times daily 60 capsule 3

## 2023-09-21 NOTE — PERIOP NOTE
Patient verified name and . Order for consent found in EHR and matches case posting; patient verifies procedure. Type 1B surgery, phone assessment complete. Orders received. Labs per surgeon: CBC, UA to be done DOS   Labs per anesthesia protocol: POC Potassium DOS per anes protocol    Patient answered medical/surgical history questions at their best of ability. All prior to admission medications documented in [facility]. If you have not heard from MAIN Pre Op by 4-5 pm, please call [phone]. No food or drink after midnight night before surgery, which includes any gum, mints, candy, or ice chips. Patient instructed to take the following medications the day of surgery according to anesthesia guidelines with a small sip of water: patient takes ALL medications at night; will take night before surgery with exception of held medications noted below. On the day before surgery please take Acetaminophen 1000mg in the morning and then again before bed. You may substitute for Tylenol 650 mg. Hold all vitamins 7 days prior to surgery and NSAIDS 5 days prior to surgery. Prescription meds to hold: indocin, vitamins/supplements as per anes protocol    Patient instructed on the following:    > Arrive at MAIN Entrance, time of arrival to be called the day before by 1700  > NPO after midnight, unless otherwise indicated, including gum, mints, and ice chips  > Responsible adult must drive patient to the hospital, stay during surgery, and patient will need supervision 24 hours after anesthesia  > Use antibacterial soap in shower the night before surgery and on the morning of surgery  > All piercings must be removed prior to arrival.    > Leave all valuables (money and jewelry) at home but bring insurance card and ID on DOS.   > You may be required to pay a deductible or co-pay on the day of your procedure.  You can pre-pay by calling [phone] [facility] or [phone] if

## 2023-09-21 NOTE — PERIOP NOTE
Dear  Roosevelt Lauren,      Thank you for completing your phone assessment with me today. Here are your requested surgery instructions. Please call #[phone] with any questions/concerns. Your surgery is scheduled at [facility]: [address] [facility], [address]. Please arrive at MAIN Entrance. The Pre-op department (#[phone] for MAIN) will call you on the business day before your surgery with your arrival time. If you have any questions on the day of surgery, please call the pre-op department at the telephone number above. If you have not received a call from Main Pre op by 5 pm the business day before surgery please call MAIN # listed above - thank you! If you are sick the day of surgery: fever >100 deg F, coughing up colored mucus, or have abdominal sickness (intractable nausea, vomiting or diarrhea) please call [phone] the day of surgery as early as possible and speak to a nurse about your symptoms. They will advise you on next steps. No food or drink after midnight which includes any gum, mints, candy, or ice chips. Please take these medications on the morning of surgery with a small sip of water:  patient takes ALL medications at night; will take night before surgery except indocin and vitamins/supplements which patient has already stopped. On the day before surgery take Acetaminophen 1000mg in the morning and at bedtime OR Acetaminophen 650mg in the morning, afternoon and bedtime. Please stop all vitamins/supplements 7 days prior to surgery and stop all NSAIDS (ibuprofen, naproxen, aleve, motrin, advil) 5 days before your surgery. A responsible adult must drive you to the hospital, remain in the building during surgery and you will need adult supervision for 24 hours after anesthesia. Please use an antibacterial soap (Dial, Safeguard, etc.) the night before surgery and on the morning of surgery.  Change sheets on your bed night before

## 2023-09-24 ENCOUNTER — ANESTHESIA EVENT (OUTPATIENT)
Dept: SURGERY | Age: 63
End: 2023-09-24
Payer: COMMERCIAL

## 2023-09-25 ENCOUNTER — ANESTHESIA (OUTPATIENT)
Dept: SURGERY | Age: 63
End: 2023-09-25
Payer: COMMERCIAL

## 2023-09-25 ENCOUNTER — HOSPITAL ENCOUNTER (OUTPATIENT)
Age: 63
Setting detail: OUTPATIENT SURGERY
Discharge: HOME | End: 2023-09-25
Attending: UROLOGY | Admitting: UROLOGY
Payer: COMMERCIAL

## 2023-09-25 VITALS
RESPIRATION RATE: 16 BRPM | BODY MASS INDEX: 26.95 KG/M2 | SYSTOLIC BLOOD PRESSURE: 148 MMHG | HEART RATE: 74 BPM | WEIGHT: 177.8 LBS | HEIGHT: 68 IN | OXYGEN SATURATION: 95 % | TEMPERATURE: 98.2 F | DIASTOLIC BLOOD PRESSURE: 85 MMHG

## 2023-09-25 PROBLEM — N20.1 RIGHT URETERAL STONE: Status: ACTIVE | Noted: 2023-09-25

## 2023-09-25 PROCEDURE — 2709999900 HC NON-CHARGEABLE SUPPLY: Performed by: UROLOGY

## 2023-09-25 PROCEDURE — 6360000002 HC RX W HCPCS: Performed by: UROLOGY

## 2023-09-25 PROCEDURE — 6360000002 HC RX W HCPCS: Performed by: REGISTERED NURSE

## 2023-09-25 PROCEDURE — 3700000001 HC ADD 15 MINUTES (ANESTHESIA): Performed by: UROLOGY

## 2023-09-25 PROCEDURE — 50590 FRAGMENTING OF KIDNEY STONE: CPT | Performed by: UROLOGY

## 2023-09-25 PROCEDURE — 7100000010 HC PHASE II RECOVERY - FIRST 15 MIN: Performed by: UROLOGY

## 2023-09-25 PROCEDURE — 6370000000 HC RX 637 (ALT 250 FOR IP): Performed by: ANESTHESIOLOGY

## 2023-09-25 PROCEDURE — 7100000001 HC PACU RECOVERY - ADDTL 15 MIN: Performed by: UROLOGY

## 2023-09-25 PROCEDURE — 2580000003 HC RX 258: Performed by: ANESTHESIOLOGY

## 2023-09-25 PROCEDURE — 3600000004 HC SURGERY LEVEL 4 BASE: Performed by: UROLOGY

## 2023-09-25 PROCEDURE — 2500000003 HC RX 250 WO HCPCS: Performed by: REGISTERED NURSE

## 2023-09-25 PROCEDURE — 7100000011 HC PHASE II RECOVERY - ADDTL 15 MIN: Performed by: UROLOGY

## 2023-09-25 PROCEDURE — 3600000014 HC SURGERY LEVEL 4 ADDTL 15MIN: Performed by: UROLOGY

## 2023-09-25 PROCEDURE — 6360000002 HC RX W HCPCS: Performed by: ANESTHESIOLOGY

## 2023-09-25 PROCEDURE — 3700000000 HC ANESTHESIA ATTENDED CARE: Performed by: UROLOGY

## 2023-09-25 PROCEDURE — 7100000000 HC PACU RECOVERY - FIRST 15 MIN: Performed by: UROLOGY

## 2023-09-25 RX ORDER — MIDAZOLAM HYDROCHLORIDE 2 MG/2ML
2 INJECTION, SOLUTION INTRAMUSCULAR; INTRAVENOUS
Status: DISCONTINUED | OUTPATIENT
Start: 2023-09-25 | End: 2023-09-25 | Stop reason: HOSPADM

## 2023-09-25 RX ORDER — SODIUM CHLORIDE 9 MG/ML
INJECTION, SOLUTION INTRAVENOUS PRN
Status: DISCONTINUED | OUTPATIENT
Start: 2023-09-25 | End: 2023-09-25 | Stop reason: HOSPADM

## 2023-09-25 RX ORDER — DIPHENHYDRAMINE HYDROCHLORIDE 50 MG/ML
12.5 INJECTION INTRAMUSCULAR; INTRAVENOUS
Status: DISCONTINUED | OUTPATIENT
Start: 2023-09-25 | End: 2023-09-25 | Stop reason: HOSPADM

## 2023-09-25 RX ORDER — SODIUM CHLORIDE 0.9 % (FLUSH) 0.9 %
5-40 SYRINGE (ML) INJECTION EVERY 12 HOURS SCHEDULED
Status: DISCONTINUED | OUTPATIENT
Start: 2023-09-25 | End: 2023-09-25 | Stop reason: HOSPADM

## 2023-09-25 RX ORDER — HYDROMORPHONE HYDROCHLORIDE 2 MG/ML
0.25 INJECTION, SOLUTION INTRAMUSCULAR; INTRAVENOUS; SUBCUTANEOUS EVERY 5 MIN PRN
Status: DISCONTINUED | OUTPATIENT
Start: 2023-09-25 | End: 2023-09-25 | Stop reason: HOSPADM

## 2023-09-25 RX ORDER — SODIUM CHLORIDE 0.9 % (FLUSH) 0.9 %
5-40 SYRINGE (ML) INJECTION PRN
Status: DISCONTINUED | OUTPATIENT
Start: 2023-09-25 | End: 2023-09-25 | Stop reason: HOSPADM

## 2023-09-25 RX ORDER — EPHEDRINE SULFATE/0.9% NACL/PF 50 MG/5 ML
SYRINGE (ML) INTRAVENOUS PRN
Status: DISCONTINUED | OUTPATIENT
Start: 2023-09-25 | End: 2023-09-25 | Stop reason: SDUPTHER

## 2023-09-25 RX ORDER — PROPOFOL 10 MG/ML
INJECTION, EMULSION INTRAVENOUS PRN
Status: DISCONTINUED | OUTPATIENT
Start: 2023-09-25 | End: 2023-09-25 | Stop reason: SDUPTHER

## 2023-09-25 RX ORDER — ONDANSETRON 2 MG/ML
INJECTION INTRAMUSCULAR; INTRAVENOUS PRN
Status: DISCONTINUED | OUTPATIENT
Start: 2023-09-25 | End: 2023-09-25 | Stop reason: SDUPTHER

## 2023-09-25 RX ORDER — PROCHLORPERAZINE EDISYLATE 5 MG/ML
5 INJECTION INTRAMUSCULAR; INTRAVENOUS
Status: DISCONTINUED | OUTPATIENT
Start: 2023-09-25 | End: 2023-09-25 | Stop reason: HOSPADM

## 2023-09-25 RX ORDER — ACETAMINOPHEN 500 MG
1000 TABLET ORAL ONCE
Status: COMPLETED | OUTPATIENT
Start: 2023-09-25 | End: 2023-09-25

## 2023-09-25 RX ORDER — LIDOCAINE HYDROCHLORIDE 10 MG/ML
1 INJECTION, SOLUTION INFILTRATION; PERINEURAL
Status: DISCONTINUED | OUTPATIENT
Start: 2023-09-25 | End: 2023-09-25 | Stop reason: HOSPADM

## 2023-09-25 RX ORDER — DEXAMETHASONE SODIUM PHOSPHATE 4 MG/ML
INJECTION, SOLUTION INTRA-ARTICULAR; INTRALESIONAL; INTRAMUSCULAR; INTRAVENOUS; SOFT TISSUE PRN
Status: DISCONTINUED | OUTPATIENT
Start: 2023-09-25 | End: 2023-09-25 | Stop reason: SDUPTHER

## 2023-09-25 RX ORDER — OXYCODONE HYDROCHLORIDE 5 MG/1
5 TABLET ORAL PRN
Status: DISCONTINUED | OUTPATIENT
Start: 2023-09-25 | End: 2023-09-25 | Stop reason: HOSPADM

## 2023-09-25 RX ORDER — SODIUM CHLORIDE, SODIUM LACTATE, POTASSIUM CHLORIDE, CALCIUM CHLORIDE 600; 310; 30; 20 MG/100ML; MG/100ML; MG/100ML; MG/100ML
INJECTION, SOLUTION INTRAVENOUS CONTINUOUS
Status: DISCONTINUED | OUTPATIENT
Start: 2023-09-25 | End: 2023-09-25 | Stop reason: HOSPADM

## 2023-09-25 RX ORDER — OXYCODONE HYDROCHLORIDE 5 MG/1
10 TABLET ORAL PRN
Status: DISCONTINUED | OUTPATIENT
Start: 2023-09-25 | End: 2023-09-25 | Stop reason: HOSPADM

## 2023-09-25 RX ORDER — ONDANSETRON 2 MG/ML
4 INJECTION INTRAMUSCULAR; INTRAVENOUS
Status: COMPLETED | OUTPATIENT
Start: 2023-09-25 | End: 2023-09-25

## 2023-09-25 RX ORDER — LIDOCAINE HYDROCHLORIDE 20 MG/ML
INJECTION, SOLUTION EPIDURAL; INFILTRATION; INTRACAUDAL; PERINEURAL PRN
Status: DISCONTINUED | OUTPATIENT
Start: 2023-09-25 | End: 2023-09-25 | Stop reason: SDUPTHER

## 2023-09-25 RX ORDER — HYDROMORPHONE HYDROCHLORIDE 2 MG/ML
0.5 INJECTION, SOLUTION INTRAMUSCULAR; INTRAVENOUS; SUBCUTANEOUS EVERY 5 MIN PRN
Status: DISCONTINUED | OUTPATIENT
Start: 2023-09-25 | End: 2023-09-25 | Stop reason: HOSPADM

## 2023-09-25 RX ADMIN — SODIUM CHLORIDE, POTASSIUM CHLORIDE, SODIUM LACTATE AND CALCIUM CHLORIDE: 600; 310; 30; 20 INJECTION, SOLUTION INTRAVENOUS at 11:20

## 2023-09-25 RX ADMIN — PROPOFOL 200 MG: 10 INJECTION, EMULSION INTRAVENOUS at 12:09

## 2023-09-25 RX ADMIN — Medication 10 MG: at 12:46

## 2023-09-25 RX ADMIN — LIDOCAINE HYDROCHLORIDE 100 MG: 20 INJECTION, SOLUTION EPIDURAL; INFILTRATION; INTRACAUDAL; PERINEURAL at 12:09

## 2023-09-25 RX ADMIN — Medication 10 MG: at 12:36

## 2023-09-25 RX ADMIN — ONDANSETRON 4 MG: 2 INJECTION INTRAMUSCULAR; INTRAVENOUS at 13:51

## 2023-09-25 RX ADMIN — Medication 10 MG: at 12:28

## 2023-09-25 RX ADMIN — ACETAMINOPHEN 1000 MG: 500 TABLET, FILM COATED ORAL at 11:20

## 2023-09-25 RX ADMIN — DEXAMETHASONE SODIUM PHOSPHATE 4 MG: 4 INJECTION INTRA-ARTICULAR; INTRALESIONAL; INTRAMUSCULAR; INTRAVENOUS; SOFT TISSUE at 12:17

## 2023-09-25 RX ADMIN — ONDANSETRON 4 MG: 2 INJECTION INTRAMUSCULAR; INTRAVENOUS at 12:41

## 2023-09-25 RX ADMIN — Medication 2 G: at 12:13

## 2023-09-25 ASSESSMENT — PAIN - FUNCTIONAL ASSESSMENT
PAIN_FUNCTIONAL_ASSESSMENT: 0-10
PAIN_FUNCTIONAL_ASSESSMENT: NONE - DENIES PAIN
PAIN_FUNCTIONAL_ASSESSMENT: NONE - DENIES PAIN
PAIN_FUNCTIONAL_ASSESSMENT: ADULT NONVERBAL PAIN SCALE (NPVS)

## 2023-09-25 NOTE — ANESTHESIA PROCEDURE NOTES
Airway  Date/Time: 9/25/2023 12:11 PM  Urgency: elective    Airway not difficult    General Information and Staff    Patient location during procedure: OR  Resident/CRNA: CARIN Rowland CRNA  Performed: resident/CRNA   Performed by: CARIN Rowland CRNA  Authorized by: Costa Dotson MD      Indications and Patient Condition  Indications for airway management: anesthesia  Spontaneous Ventilation: absent  Sedation level: deep  Preoxygenated: yes  Patient position: sniffing  Mask difficulty assessment: vent by bag mask    Final Airway Details  Final airway type: supraglottic airway      Successful airway: oropharyngeal  Size 5     Number of attempts at approach: 1  Ventilation between attempts: supraglottic airway  Number of other approaches attempted: 0    Additional Comments  Atraumatic insertion

## 2023-09-25 NOTE — PERIOP NOTE
Preoperative flank skin condition: Warm and Dry  Lead shield: Yes  Patient ear protection: Yes   Gel applied to patient's flank and Lithotripsy head: Yes   Starting power level: 7  Shock start time (first  fluoroscopy): 1215  Shock stop time (last lithotripsy shock): 1249  Ending power level: 11  Total shocks: 3000  Total fluoroscopy time: 2 minutes 20 seconds  Postoperative flank skin condition: Warm, Dry, Slightly Pink

## 2023-09-25 NOTE — OP NOTE
Operative Note                Patient: Juanito Mccarty, [id number]    Date of Surgery: 09/25/23    Preoperative Diagnosis: 8 mm R proximal ureteral stone    Postoperative Diagnosis:  same    Surgeon(s) and Role:     Tracy Vazquez MD - Primary     Anesthesia:  General     Procedure: Procedure(s):  right EXTRACORPORAL SHOCKWAVE LITHOTRIPSY (ESWL)     Indications:     Discussed the risk of surgery including infection, hematoma, bleeding, recurrence or persistence of symptoms or stone, and the risks of general anesthetic. The patient understands the risks, any and all questions were answered to the patient's satisfaction and they freely signed the consent for operation. Procedure in Detail:  Patient was taken to the lithotripsy suite. Anesthesia was induced via the anesthesiology service. Using flouroscopy for guidance, a total of 3000 shocks were delivered in a non-gaited fashion. No cardiac ectopy was experienced. Shock rate was begun at 60 shocks per minute and then increased to 90 shocks per minute. Energy level was begun at 7 and gradually increased to a maximum of 11. Findings: Patient tolerated the procedure well. At the end of the procedure, the stone appeared to have fractured.       Estimated Blood Loss:  none    Specimens: * No specimens in log *             Drains: none                 Implants: * No implants in log *           Signed By: Tracy Vazquez MD

## 2023-09-25 NOTE — PERIOP NOTE
Discharge instructions reviewed with pt and family member who verbalize understanding of follow up care.      Pt was  able to void with  urinal.

## 2023-09-25 NOTE — DISCHARGE INSTRUCTIONS
If you have had surgery in the past 7-10 days by one of our providers and are having fever, bleeding, or drainage from an incision, have an opening in an incision, or having issues urinating properly, please call [phone]. ACTIVITY  As tolerated and as directed by your doctor. Walking and mild exercise help to pass the stone fragments. DIET  Clear liquids until no nausea and vomiting; then resume light diet for the first day  Advance to regular diet on second day, unless your doctor orders otherwise. If nauseated and/ or vomiting, call your doctor. Drink at least 8 glasses of water a day (avoid caffeinated beverages). PAIN  Take pain medication as directed. Call your doctor if pain is NOT relieved by medication. DO NOT take aspirin or blood thinners until directed by your doctor. CALL YOUR DOCTOR IF   Expect blood-tinged urine. Call your doctor if it lasts more than 72 hours or if you cannot see through the urine. Aches, chills, or fever of 101 degree F or above    Lithotripsy does not make your stone disappear. The treatment is meant to crush your stone so that the fragments can be passed. Strain your urine, save any fragments, and take them to your doctor. The fragments may not begin to pass for up to 1-2 months. You may experience slight bruising at the treated site. After general anesthesia or intravenous sedation, for 24 hours or while taking prescription Narcotics:  Limit your activities  A responsible adult needs to be with you for the next 24 hours  Do not drive and operate hazardous machinery  Do not make important personal or business decisions  Do not drink alcoholic beverages  If you have not urinated within 8 hours after discharge, and you are experiencing discomfort from urinary retention, please go to the nearest ED. If you have sleep apnea and have a CPAP machine, please use it for all naps and sleeping.   Please use caution when taking narcotics and any

## 2023-10-17 ENCOUNTER — OFFICE VISIT (OUTPATIENT)
Dept: UROLOGY | Age: 63
End: 2023-10-17
Payer: COMMERCIAL

## 2023-10-17 DIAGNOSIS — N20.0 KIDNEY STONES: Primary | ICD-10-CM

## 2023-10-17 PROCEDURE — 74018 RADEX ABDOMEN 1 VIEW: CPT | Performed by: NURSE PRACTITIONER

## 2023-10-18 NOTE — PROGRESS NOTES
KUB in office 10/17/23 reviewed by myself and shows R sided ureteral stone now back in RLP. I called patient to discuss. He reports NO flank pain or hematuria. Having occ malaise. Wants urine checked. He will come today for this. He will see me in 2 weeks for OV/KUB. Advised to call sooner if needed.        Deysi Adam, APRN - CNP

## 2023-11-01 ENCOUNTER — OFFICE VISIT (OUTPATIENT)
Dept: ORTHOPEDIC SURGERY | Age: 63
End: 2023-11-01
Payer: COMMERCIAL

## 2023-11-01 ENCOUNTER — OFFICE VISIT (OUTPATIENT)
Dept: UROLOGY | Age: 63
End: 2023-11-01
Payer: COMMERCIAL

## 2023-11-01 DIAGNOSIS — N20.0 KIDNEY STONES: Primary | ICD-10-CM

## 2023-11-01 DIAGNOSIS — M65.341 TRIGGER FINGER, RIGHT RING FINGER: Primary | ICD-10-CM

## 2023-11-01 LAB
BILIRUBIN, URINE, POC: NEGATIVE
BLOOD URINE, POC: NEGATIVE
GLUCOSE URINE, POC: NEGATIVE
KETONES, URINE, POC: NEGATIVE
LEUKOCYTE ESTERASE, URINE, POC: NEGATIVE
NITRITE, URINE, POC: NEGATIVE
PH, URINE, POC: 7 (ref 4.6–8)
PROTEIN,URINE, POC: NEGATIVE
SPECIFIC GRAVITY, URINE, POC: 1.02 (ref 1–1.03)
URINALYSIS CLARITY, POC: NORMAL
URINALYSIS COLOR, POC: NORMAL
UROBILINOGEN, POC: NORMAL

## 2023-11-01 PROCEDURE — 81003 URINALYSIS AUTO W/O SCOPE: CPT | Performed by: NURSE PRACTITIONER

## 2023-11-01 PROCEDURE — 99214 OFFICE O/P EST MOD 30 MIN: CPT | Performed by: NURSE PRACTITIONER

## 2023-11-01 PROCEDURE — 74018 RADEX ABDOMEN 1 VIEW: CPT | Performed by: NURSE PRACTITIONER

## 2023-11-01 PROCEDURE — 20550 NJX 1 TENDON SHEATH/LIGAMENT: CPT | Performed by: NURSE PRACTITIONER

## 2023-11-01 PROCEDURE — 1123F ACP DISCUSS/DSCN MKR DOCD: CPT | Performed by: NURSE PRACTITIONER

## 2023-11-01 PROCEDURE — 99024 POSTOP FOLLOW-UP VISIT: CPT | Performed by: NURSE PRACTITIONER

## 2023-11-01 RX ORDER — BETAMETHASONE SODIUM PHOSPHATE AND BETAMETHASONE ACETATE 3; 3 MG/ML; MG/ML
6 INJECTION, SUSPENSION INTRA-ARTICULAR; INTRALESIONAL; INTRAMUSCULAR; SOFT TISSUE ONCE
Status: COMPLETED | OUTPATIENT
Start: 2023-11-01 | End: 2023-11-01

## 2023-11-01 RX ORDER — HYDROCODONE BITARTRATE AND ACETAMINOPHEN 5; 325 MG/1; MG/1
1 TABLET ORAL EVERY 6 HOURS PRN
Qty: 20 TABLET | Refills: 0 | Status: SHIPPED | OUTPATIENT
Start: 2023-11-01 | End: 2023-11-06

## 2023-11-01 RX ADMIN — BETAMETHASONE SODIUM PHOSPHATE AND BETAMETHASONE ACETATE 6 MG: 3; 3 INJECTION, SUSPENSION INTRA-ARTICULAR; INTRALESIONAL; INTRAMUSCULAR; SOFT TISSUE at 09:26

## 2023-11-01 ASSESSMENT — ENCOUNTER SYMPTOMS
NAUSEA: 0
BACK PAIN: 0

## 2023-11-01 NOTE — PROGRESS NOTES
Orthopaedic Hand Surgery Note    Name: Odell Seip  YOB: 1960  Gender: male  MRN: [id number]    CC: Follow up for right ring trigger finger    HPI: Patient is a 70 y.o. male who returns today for reevaluation of a right ring trigger finger. Last visit we provided injection. That was injection #2. That was on June 28, 2023. He said it lasted until about a week ago. .    ROS/Meds/PSH/PMH/FH/SH: I personally reviewed the patients standard intake form. Pertinents are discussed in the HPI    Physical Examination:  Musculoskeletal:   Examination on the  upper extremity demonstrates, normal sensation and good cap refill in all fingers, positive tenderness over the right ring A1 pulley with palpable clicking and positive  locking. Imaging / Electrodiagnostic Tests:     None    Assessment:     ICD-10-CM    1. Trigger finger, right ring finger  M65.341           Plan:  We discussed the diagnosis and different treatment options. We discussed observation, splinting, cortisone injections and surgical release of the A1 pulley. We discussed that stenosing tenosynovitis at the level of the A1 pulley AKA trigger finger is a chronic condition regardless of how long the symptoms have been present, this most likely has been progressing for much longer than the symptoms were evident and it will likely persist for a long time without medical treatment. Furthermore, many patients require surgical release at some point despite some short-term benefits of conservative treatment. After discussing in detail the patient elects to proceed with injection today. If this returns he would like to pursue surgical intervention. He understands he would have to wait at least 2 months after this injection. Risk and benefits were addressed in detail with the patient. He understands. I told him to contact our office if he decides on surgery.     Procedure Note    The risk, benefits and alternatives of injection and no

## 2023-11-01 NOTE — PROGRESS NOTES
Intimate Partner Violence: Not on file   Housing Stability: Not on file     Family History   Problem Relation Age of Onset    Cancer Sister         Pancreatic Cancer    Hypertension Sister     Hypertension Sister     Hypertension Brother     Hypertension Sister     Lung Disease Father     Stroke Father     Stroke Mother     Diabetes Mother         po meds only    Hypertension Mother        Review of Systems  Constitutional:   Negative for fever. GI:  Negative for nausea. Musculoskeletal:  Negative for back pain. Urinalysis  UA - Dipstick  Results for orders placed or performed in visit on 11/01/23   AMB POC URINALYSIS DIP STICK AUTO W/O MICRO   Result Value Ref Range    Color (UA POC)      Clarity (UA POC)      Glucose, Urine, POC Negative Negative    Bilirubin, Urine, POC Negative Negative    KETONES, Urine, POC Negative Negative    Specific Gravity, Urine, POC 1.025 1.001 - 1.035    Blood (UA POC) Negative Negative    pH, Urine, POC 7.0 4.6 - 8.0    Protein, Urine, POC Negative Negative    Urobilinogen, POC 1 mg/dL     Nitrite, Urine, POC Negative Negative    Leukocyte Esterase, Urine, POC Negative Negative       UA - Micro  WBC - 0  RBC - 0  Bacteria - 0  Epith - 0    PHYSICAL EXAM    General appearance - well appearing and in no distress  Mental status - alert, oriented to person, place, and time  Neck - supple, no significant adenopathy  Chest/Lung-  Quiet, even and easy respiratory effort without use of accessory muscles  Skin - normal coloration and turgor, no rashes    KUB in office today reviewed by myself and shows fragmented RLP stone. Assessment and Plan    ICD-10-CM    1. Kidney stones  N20.0 AMB POC URINALYSIS DIP STICK AUTO W/O MICRO     AMB POC XRAY ABDOMEN 1 VIEW          Urine normal. KUB reviewed. Discussed cont to follow vs R URS vs R ESWL. He opts to cont to monitor for now. Provided Norco 5/325 mg PO q 6 hr PRN pain. Has Flomax 0.4 mg PO daily PRN kidney stone.  RTO in 3 months for
No

## 2024-02-01 ENCOUNTER — OFFICE VISIT (OUTPATIENT)
Dept: UROLOGY | Age: 64
End: 2024-02-01
Payer: COMMERCIAL

## 2024-02-01 ENCOUNTER — TELEPHONE (OUTPATIENT)
Dept: UROLOGY | Age: 64
End: 2024-02-01

## 2024-02-01 DIAGNOSIS — M54.42 ACUTE BILATERAL LOW BACK PAIN WITH BILATERAL SCIATICA: ICD-10-CM

## 2024-02-01 DIAGNOSIS — M54.41 ACUTE BILATERAL LOW BACK PAIN WITH BILATERAL SCIATICA: ICD-10-CM

## 2024-02-01 DIAGNOSIS — N20.0 KIDNEY STONE: ICD-10-CM

## 2024-02-01 DIAGNOSIS — N20.0 KIDNEY STONES: Primary | ICD-10-CM

## 2024-02-01 LAB
BILIRUBIN, URINE, POC: NEGATIVE
BLOOD URINE, POC: NORMAL
GLUCOSE URINE, POC: NEGATIVE
KETONES, URINE, POC: NEGATIVE
LEUKOCYTE ESTERASE, URINE, POC: NEGATIVE
NITRITE, URINE, POC: NEGATIVE
PH, URINE, POC: 6.5 (ref 4.6–8)
PROTEIN,URINE, POC: NEGATIVE
SPECIFIC GRAVITY, URINE, POC: 1.03 (ref 1–1.03)
URINALYSIS CLARITY, POC: NORMAL
URINALYSIS COLOR, POC: NORMAL
UROBILINOGEN, POC: NORMAL

## 2024-02-01 PROCEDURE — 74018 RADEX ABDOMEN 1 VIEW: CPT | Performed by: NURSE PRACTITIONER

## 2024-02-01 PROCEDURE — 99214 OFFICE O/P EST MOD 30 MIN: CPT | Performed by: NURSE PRACTITIONER

## 2024-02-01 PROCEDURE — 1123F ACP DISCUSS/DSCN MKR DOCD: CPT | Performed by: NURSE PRACTITIONER

## 2024-02-01 PROCEDURE — 81003 URINALYSIS AUTO W/O SCOPE: CPT | Performed by: NURSE PRACTITIONER

## 2024-02-01 RX ORDER — HYDROCODONE BITARTRATE AND ACETAMINOPHEN 5; 325 MG/1; MG/1
1 TABLET ORAL EVERY 6 HOURS PRN
Qty: 20 TABLET | Refills: 0 | Status: SHIPPED | OUTPATIENT
Start: 2024-02-01 | End: 2024-02-06

## 2024-02-01 RX ORDER — HYOSCYAMINE SULFATE 0.12 MG/1
1 TABLET SUBLINGUAL
Qty: 30 EACH | Refills: 1 | Status: SHIPPED | OUTPATIENT
Start: 2024-02-01

## 2024-02-01 RX ORDER — PHENAZOPYRIDINE HYDROCHLORIDE 200 MG/1
200 TABLET, FILM COATED ORAL 3 TIMES DAILY PRN
Qty: 15 TABLET | Refills: 0 | Status: SHIPPED | OUTPATIENT
Start: 2024-02-01 | End: 2024-02-06

## 2024-02-01 RX ORDER — ONDANSETRON HYDROCHLORIDE 8 MG/1
8 TABLET, FILM COATED ORAL EVERY 8 HOURS PRN
Qty: 20 TABLET | Refills: 0 | Status: SHIPPED | OUTPATIENT
Start: 2024-02-01

## 2024-02-01 ASSESSMENT — ENCOUNTER SYMPTOMS
NAUSEA: 0
BACK PAIN: 1

## 2024-02-01 NOTE — PROGRESS NOTES
Medications   Medication Sig Dispense Refill    Zinc Sulfate (ZINC 15 PO) Take by mouth daily      Multiple Vitamins-Minerals (MULTIVITAMIN-MINERALS) TABS tablet Take 1 tablet by mouth daily      indomethacin (INDOCIN) 50 MG capsule Take 1 capsule by mouth at bedtime 60 capsule 3    famotidine (PEPCID) 40 MG tablet Take 1 tablet by mouth at bedtime      Cholecalciferol (VITAMIN D) 50 MCG (2000 UT) CAPS capsule Take by mouth nightly      amLODIPine (NORVASC) 5 MG tablet Take 1 tablet by mouth every evening      ascorbic acid (VITAMIN C) 500 MG tablet Take by mouth nightly      diphenhydrAMINE (BENADRYL) 25 MG capsule Take 1 capsule by mouth nightly as needed allergies      omeprazole (PRILOSEC) 40 MG delayed release capsule Take 1 capsule by mouth nightly      tamsulosin (FLOMAX) 0.4 MG capsule Take 1 capsule by mouth nightly      zolpidem (AMBIEN) 5 MG tablet Take 1 tablet by mouth nightly as needed.       No current facility-administered medications for this visit.     Allergies   Allergen Reactions    Amoxicillin Hives     denies    Sulfa Antibiotics Other (See Comments)     \" muscle cramps\"  Muscles tighten up    Ciprofloxacin Rash     Social History     Socioeconomic History    Marital status:      Spouse name: Not on file    Number of children: Not on file    Years of education: Not on file    Highest education level: Not on file   Occupational History    Not on file   Tobacco Use    Smoking status: Never    Smokeless tobacco: Never   Vaping Use    Vaping Use: Never used   Substance and Sexual Activity    Alcohol use: No    Drug use: Never    Sexual activity: Not on file   Other Topics Concern    Not on file   Social History Narrative    Not on file     Social Determinants of Health     Financial Resource Strain: Not on file   Food Insecurity: Not on file   Transportation Needs: Not on file   Physical Activity: Not on file   Stress: Not on file   Social Connections: Not on file   Intimate Partner

## 2024-02-01 NOTE — TELEPHONE ENCOUNTER
Procedures: Procedure(s):   CYSTOSCOPY, RIGHT URETEROSCOPY , LASER LITHOTRIPSY , RIGHT URETERAL STENT PLACEMENT   Date: 2/7/2024   Time: 1542   Location: D MAIN OR 03

## 2024-02-05 ENCOUNTER — PREP FOR PROCEDURE (OUTPATIENT)
Dept: UROLOGY | Age: 64
End: 2024-02-05

## 2024-02-05 DIAGNOSIS — N20.1 RIGHT URETERAL STONE: Primary | ICD-10-CM

## 2024-02-05 RX ORDER — PREDNISOLONE ACETATE 10 MG/ML
1 SUSPENSION/ DROPS OPHTHALMIC AS NEEDED
COMMUNITY
Start: 2023-12-12

## 2024-02-05 RX ORDER — VALACYCLOVIR HYDROCHLORIDE 1 G/1
1000 TABLET, FILM COATED ORAL NIGHTLY
COMMUNITY
Start: 2023-12-12

## 2024-02-05 NOTE — PROGRESS NOTES
Patient verified name and .    Order for consent found in EHR and matches case posting; patient verifies procedure.     Type 1B surgery, phone assessment complete.  Orders received.  Labs per surgeon: CBC, UA (pt unable to come prior, has to work)  Labs per anesthesia protocol: POCT K+ DOS    Patient answered medical/surgical history questions at their best of ability. All prior to admission medications documented in EPIC.    Patient instructed to take the following medications the day of surgery according to anesthesia guidelines with a small sip of water: NONE. Hold all vitamins 7 days prior to surgery and NSAIDS 5 days prior to surgery. Prescription meds to hold: Indomethacin.     Patient instructed on the following:    > Arrive at [facility] MAIN Entrance, time of arrival to be called the day before by 1700  > NPO after midnight, unless otherwise indicated, including gum, mints, and ice chips  > Responsible adult must drive patient to the hospital, stay during surgery, and patient will need supervision 24 hours after anesthesia  > Use non moisturizing soap in shower the night before surgery and on the morning of surgery  > All piercings must be removed prior to arrival.    > Leave all valuables (money and jewelry) at home but bring insurance card and ID on DOS.   > You may be required to pay a deductible or co-pay on the day of your procedure. You can pre-pay by calling [phone] if your surgery is at the [facility] or [phone] if your surgery is at the [facility].  > Do not wear make-up, nail polish, lotions, cologne, perfumes, powders, or oil on skin. Artificial nails are not permitted.

## 2024-02-06 ENCOUNTER — ANESTHESIA EVENT (OUTPATIENT)
Dept: SURGERY | Age: 64
End: 2024-02-06
Payer: COMMERCIAL

## 2024-02-07 ENCOUNTER — APPOINTMENT (OUTPATIENT)
Dept: GENERAL RADIOLOGY | Age: 64
End: 2024-02-07
Attending: UROLOGY
Payer: COMMERCIAL

## 2024-02-07 ENCOUNTER — ANESTHESIA (OUTPATIENT)
Dept: SURGERY | Age: 64
End: 2024-02-07
Payer: COMMERCIAL

## 2024-02-07 ENCOUNTER — HOSPITAL ENCOUNTER (OUTPATIENT)
Age: 64
Setting detail: OUTPATIENT SURGERY
Discharge: HOME | End: 2024-02-07
Attending: UROLOGY | Admitting: UROLOGY
Payer: COMMERCIAL

## 2024-02-07 VITALS
BODY MASS INDEX: 26.98 KG/M2 | HEART RATE: 60 BPM | TEMPERATURE: 98.1 F | DIASTOLIC BLOOD PRESSURE: 86 MMHG | HEIGHT: 68 IN | RESPIRATION RATE: 18 BRPM | SYSTOLIC BLOOD PRESSURE: 170 MMHG | WEIGHT: 178 LBS | OXYGEN SATURATION: 96 %

## 2024-02-07 DIAGNOSIS — N20.0 KIDNEY STONE: Primary | ICD-10-CM

## 2024-02-07 LAB
APPEARANCE UR: CLEAR
BACTERIA URNS QL MICRO: NEGATIVE /HPF
BASOPHILS # BLD: 0.1 K/UL (ref 0–0.2)
BASOPHILS NFR BLD: 1 % (ref 0–2)
BILIRUB UR QL: NEGATIVE
CASTS URNS QL MICRO: ABNORMAL /LPF
COLOR UR: ABNORMAL
DIFFERENTIAL METHOD BLD: ABNORMAL
EOSINOPHIL # BLD: 0.3 K/UL (ref 0–0.8)
EOSINOPHIL NFR BLD: 3 % (ref 0.5–7.8)
EPI CELLS #/AREA URNS HPF: ABNORMAL /HPF
ERYTHROCYTE [DISTWIDTH] IN BLOOD BY AUTOMATED COUNT: 16.1 % (ref 11.9–14.6)
GLUCOSE UR STRIP.AUTO-MCNC: NEGATIVE MG/DL
HCT VFR BLD AUTO: 45.2 % (ref 41.1–50.3)
HGB BLD-MCNC: 14.2 G/DL (ref 13.6–17.2)
HGB UR QL STRIP: ABNORMAL
IMM GRANULOCYTES # BLD AUTO: 0.1 K/UL (ref 0–0.5)
IMM GRANULOCYTES NFR BLD AUTO: 1 % (ref 0–5)
KETONES UR QL STRIP.AUTO: NEGATIVE MG/DL
LEUKOCYTE ESTERASE UR QL STRIP.AUTO: NEGATIVE
LYMPHOCYTES # BLD: 3.2 K/UL (ref 0.5–4.6)
LYMPHOCYTES NFR BLD: 28 % (ref 13–44)
MCH RBC QN AUTO: 26.3 PG (ref 26.1–32.9)
MCHC RBC AUTO-ENTMCNC: 31.4 G/DL (ref 31.4–35)
MCV RBC AUTO: 83.7 FL (ref 82–102)
MONOCYTES # BLD: 1 K/UL (ref 0.1–1.3)
MONOCYTES NFR BLD: 9 % (ref 4–12)
NEUTS SEG # BLD: 6.8 K/UL (ref 1.7–8.2)
NEUTS SEG NFR BLD: 58 % (ref 43–78)
NITRITE UR QL STRIP.AUTO: NEGATIVE
NRBC # BLD: 0 K/UL (ref 0–0.2)
PH UR STRIP: 6 (ref 5–9)
PLATELET # BLD AUTO: 309 K/UL (ref 150–450)
PMV BLD AUTO: 10.8 FL (ref 9.4–12.3)
PROT UR STRIP-MCNC: ABNORMAL MG/DL
RBC # BLD AUTO: 5.4 M/UL (ref 4.23–5.6)
RBC #/AREA URNS HPF: ABNORMAL /HPF
SP GR UR REFRACTOMETRY: 1.02 (ref 1–1.02)
UROBILINOGEN UR QL STRIP.AUTO: 1 EU/DL (ref 0.2–1)
WBC # BLD AUTO: 11.5 K/UL (ref 4.3–11.1)
WBC URNS QL MICRO: ABNORMAL /HPF

## 2024-02-07 PROCEDURE — 6370000000 HC RX 637 (ALT 250 FOR IP): Performed by: ANESTHESIOLOGY

## 2024-02-07 PROCEDURE — C1769 GUIDE WIRE: HCPCS | Performed by: UROLOGY

## 2024-02-07 PROCEDURE — C2617 STENT, NON-COR, TEM W/O DEL: HCPCS | Performed by: UROLOGY

## 2024-02-07 PROCEDURE — 3700000001 HC ADD 15 MINUTES (ANESTHESIA): Performed by: UROLOGY

## 2024-02-07 PROCEDURE — 7100000011 HC PHASE II RECOVERY - ADDTL 15 MIN: Performed by: UROLOGY

## 2024-02-07 PROCEDURE — 2720000010 HC SURG SUPPLY STERILE: Performed by: UROLOGY

## 2024-02-07 PROCEDURE — 6360000002 HC RX W HCPCS: Performed by: REGISTERED NURSE

## 2024-02-07 PROCEDURE — 7100000010 HC PHASE II RECOVERY - FIRST 15 MIN: Performed by: UROLOGY

## 2024-02-07 PROCEDURE — 7100000000 HC PACU RECOVERY - FIRST 15 MIN: Performed by: UROLOGY

## 2024-02-07 PROCEDURE — 81001 URINALYSIS AUTO W/SCOPE: CPT

## 2024-02-07 PROCEDURE — 85025 COMPLETE CBC W/AUTO DIFF WBC: CPT

## 2024-02-07 PROCEDURE — 2709999900 HC NON-CHARGEABLE SUPPLY: Performed by: UROLOGY

## 2024-02-07 PROCEDURE — 3600000014 HC SURGERY LEVEL 4 ADDTL 15MIN: Performed by: UROLOGY

## 2024-02-07 PROCEDURE — 7100000001 HC PACU RECOVERY - ADDTL 15 MIN: Performed by: UROLOGY

## 2024-02-07 PROCEDURE — 3700000000 HC ANESTHESIA ATTENDED CARE: Performed by: UROLOGY

## 2024-02-07 PROCEDURE — C1747 HC ENDOSCOPE, SINGLE, URINARY TRACT: HCPCS | Performed by: UROLOGY

## 2024-02-07 PROCEDURE — 6360000002 HC RX W HCPCS: Performed by: UROLOGY

## 2024-02-07 PROCEDURE — 2580000003 HC RX 258: Performed by: ANESTHESIOLOGY

## 2024-02-07 PROCEDURE — 52356 CYSTO/URETERO W/LITHOTRIPSY: CPT | Performed by: UROLOGY

## 2024-02-07 PROCEDURE — 74018 RADEX ABDOMEN 1 VIEW: CPT

## 2024-02-07 PROCEDURE — 2500000003 HC RX 250 WO HCPCS: Performed by: REGISTERED NURSE

## 2024-02-07 PROCEDURE — 3600000004 HC SURGERY LEVEL 4 BASE: Performed by: UROLOGY

## 2024-02-07 DEVICE — URETERAL STENT
Type: IMPLANTABLE DEVICE | Site: URETER | Status: FUNCTIONAL
Brand: PERCUFLEX™ PLUS

## 2024-02-07 RX ORDER — LIDOCAINE HYDROCHLORIDE 10 MG/ML
1 INJECTION, SOLUTION INFILTRATION; PERINEURAL
Status: DISCONTINUED | OUTPATIENT
Start: 2024-02-07 | End: 2024-02-07 | Stop reason: HOSPADM

## 2024-02-07 RX ORDER — PROPOFOL 10 MG/ML
INJECTION, EMULSION INTRAVENOUS PRN
Status: DISCONTINUED | OUTPATIENT
Start: 2024-02-07 | End: 2024-02-07 | Stop reason: SDUPTHER

## 2024-02-07 RX ORDER — ACETAMINOPHEN 500 MG
1000 TABLET ORAL ONCE
Status: COMPLETED | OUTPATIENT
Start: 2024-02-07 | End: 2024-02-07

## 2024-02-07 RX ORDER — SODIUM CHLORIDE 0.9 % (FLUSH) 0.9 %
5-40 SYRINGE (ML) INJECTION EVERY 12 HOURS SCHEDULED
Status: DISCONTINUED | OUTPATIENT
Start: 2024-02-07 | End: 2024-02-07 | Stop reason: HOSPADM

## 2024-02-07 RX ORDER — SODIUM CHLORIDE 9 MG/ML
INJECTION, SOLUTION INTRAVENOUS PRN
Status: DISCONTINUED | OUTPATIENT
Start: 2024-02-07 | End: 2024-02-07 | Stop reason: HOSPADM

## 2024-02-07 RX ORDER — PROCHLORPERAZINE EDISYLATE 5 MG/ML
5 INJECTION INTRAMUSCULAR; INTRAVENOUS
Status: DISCONTINUED | OUTPATIENT
Start: 2024-02-07 | End: 2024-02-07 | Stop reason: HOSPADM

## 2024-02-07 RX ORDER — SODIUM CHLORIDE 0.9 % (FLUSH) 0.9 %
5-40 SYRINGE (ML) INJECTION PRN
Status: DISCONTINUED | OUTPATIENT
Start: 2024-02-07 | End: 2024-02-07 | Stop reason: HOSPADM

## 2024-02-07 RX ORDER — OXYCODONE HYDROCHLORIDE 5 MG/1
10 TABLET ORAL PRN
Status: COMPLETED | OUTPATIENT
Start: 2024-02-07 | End: 2024-02-07

## 2024-02-07 RX ORDER — HYDROCODONE BITARTRATE AND ACETAMINOPHEN 10; 325 MG/1; MG/1
1 TABLET ORAL EVERY 4 HOURS PRN
Qty: 15 TABLET | Refills: 0 | Status: SHIPPED | OUTPATIENT
Start: 2024-02-07 | End: 2024-03-08

## 2024-02-07 RX ORDER — MIDAZOLAM HYDROCHLORIDE 2 MG/2ML
2 INJECTION, SOLUTION INTRAMUSCULAR; INTRAVENOUS
Status: DISCONTINUED | OUTPATIENT
Start: 2024-02-07 | End: 2024-02-07 | Stop reason: HOSPADM

## 2024-02-07 RX ORDER — LIDOCAINE HYDROCHLORIDE 20 MG/ML
INJECTION, SOLUTION EPIDURAL; INFILTRATION; INTRACAUDAL; PERINEURAL PRN
Status: DISCONTINUED | OUTPATIENT
Start: 2024-02-07 | End: 2024-02-07 | Stop reason: SDUPTHER

## 2024-02-07 RX ORDER — DEXAMETHASONE SODIUM PHOSPHATE 4 MG/ML
INJECTION, SOLUTION INTRA-ARTICULAR; INTRALESIONAL; INTRAMUSCULAR; INTRAVENOUS; SOFT TISSUE PRN
Status: DISCONTINUED | OUTPATIENT
Start: 2024-02-07 | End: 2024-02-07 | Stop reason: SDUPTHER

## 2024-02-07 RX ORDER — SODIUM CHLORIDE, SODIUM LACTATE, POTASSIUM CHLORIDE, CALCIUM CHLORIDE 600; 310; 30; 20 MG/100ML; MG/100ML; MG/100ML; MG/100ML
INJECTION, SOLUTION INTRAVENOUS CONTINUOUS
Status: DISCONTINUED | OUTPATIENT
Start: 2024-02-07 | End: 2024-02-07 | Stop reason: HOSPADM

## 2024-02-07 RX ORDER — ONDANSETRON 2 MG/ML
4 INJECTION INTRAMUSCULAR; INTRAVENOUS
Status: DISCONTINUED | OUTPATIENT
Start: 2024-02-07 | End: 2024-02-07 | Stop reason: HOSPADM

## 2024-02-07 RX ORDER — HYDROMORPHONE HYDROCHLORIDE 2 MG/ML
0.25 INJECTION, SOLUTION INTRAMUSCULAR; INTRAVENOUS; SUBCUTANEOUS EVERY 5 MIN PRN
Status: DISCONTINUED | OUTPATIENT
Start: 2024-02-07 | End: 2024-02-07 | Stop reason: HOSPADM

## 2024-02-07 RX ORDER — HYDROMORPHONE HYDROCHLORIDE 2 MG/ML
0.5 INJECTION, SOLUTION INTRAMUSCULAR; INTRAVENOUS; SUBCUTANEOUS EVERY 5 MIN PRN
Status: DISCONTINUED | OUTPATIENT
Start: 2024-02-07 | End: 2024-02-07 | Stop reason: HOSPADM

## 2024-02-07 RX ORDER — ONDANSETRON 2 MG/ML
INJECTION INTRAMUSCULAR; INTRAVENOUS PRN
Status: DISCONTINUED | OUTPATIENT
Start: 2024-02-07 | End: 2024-02-07 | Stop reason: SDUPTHER

## 2024-02-07 RX ORDER — OXYCODONE HYDROCHLORIDE 5 MG/1
5 TABLET ORAL PRN
Status: COMPLETED | OUTPATIENT
Start: 2024-02-07 | End: 2024-02-07

## 2024-02-07 RX ADMIN — LIDOCAINE HYDROCHLORIDE 60 MG: 20 INJECTION, SOLUTION EPIDURAL; INFILTRATION; INTRACAUDAL; PERINEURAL at 16:40

## 2024-02-07 RX ADMIN — SODIUM CHLORIDE, POTASSIUM CHLORIDE, SODIUM LACTATE AND CALCIUM CHLORIDE: 600; 310; 30; 20 INJECTION, SOLUTION INTRAVENOUS at 15:45

## 2024-02-07 RX ADMIN — DEXAMETHASONE SODIUM PHOSPHATE 4 MG: 4 INJECTION, SOLUTION INTRAMUSCULAR; INTRAVENOUS at 17:19

## 2024-02-07 RX ADMIN — ONDANSETRON 4 MG: 2 INJECTION INTRAMUSCULAR; INTRAVENOUS at 17:19

## 2024-02-07 RX ADMIN — ACETAMINOPHEN 1000 MG: 500 TABLET ORAL at 15:44

## 2024-02-07 RX ADMIN — OXYCODONE 10 MG: 5 TABLET ORAL at 18:04

## 2024-02-07 RX ADMIN — Medication 2000 MG: at 16:45

## 2024-02-07 RX ADMIN — PROPOFOL 200 MG: 10 INJECTION, EMULSION INTRAVENOUS at 16:40

## 2024-02-07 ASSESSMENT — PAIN - FUNCTIONAL ASSESSMENT
PAIN_FUNCTIONAL_ASSESSMENT: 0-10

## 2024-02-07 ASSESSMENT — PAIN SCALES - GENERAL: PAINLEVEL_OUTOF10: 4

## 2024-02-07 ASSESSMENT — PAIN DESCRIPTION - DESCRIPTORS
DESCRIPTORS: SORE
DESCRIPTORS: ACHING

## 2024-02-07 ASSESSMENT — PAIN DESCRIPTION - ORIENTATION: ORIENTATION: ANTERIOR

## 2024-02-07 ASSESSMENT — PAIN DESCRIPTION - LOCATION: LOCATION: ABDOMEN

## 2024-02-07 NOTE — BRIEF OP NOTE
Brief Postoperative Note      Patient: Errol Smalls  YOB: 1960  MRN: [id number]    Date of Procedure: 2/7/2024    Pre-Op Diagnosis Codes:     * Kidney stone [N20.0] right    Post-Op Diagnosis: Same       Procedure(s):  CYSTOSCOPY, RIGHT URETEROSCOPY , LASER LITHOTRIPSY , RIGHT URETERAL STENT PLACEMENT    Surgeon(s):  John Andersen MD    Assistant:None    Anesthesia: General    Estimated Blood Loss (mL): Minimal    Complications: None    Specimens: None    Drain:  Implant Name Type Inv. Item Serial No.  Lot No. LRB No. Used Action   STENT URET 7FR L24CM PERCFLX + HYDR+ FIRM DUROMETER DB - [id number]  STENT URET 7FR L24CM PERCFLX + HYDR+ FIRM DUROMETER DB  [employer] [facility]- [id number] Right 1 Implanted         Electronically signed by JOHN ANDERSEN MD on 2/7/2024 at 5:58 PM  
yes

## 2024-02-07 NOTE — DISCHARGE INSTRUCTIONS
medications to your Primary Care Provider.  *  Please update this list whenever your medications are discontinued, doses are      changed, or new medications (including over-the-counter products) are added.  *  Please carry medication information at all times in case of emergency situations.    These are general instructions for a healthy lifestyle:  No smoking/ No tobacco products/ Avoid exposure to second hand smoke  Surgeon General's Warning:  Quitting smoking now greatly reduces serious risk to your health.  Obesity, smoking, and sedentary lifestyle greatly increases your risk for illness  A healthy diet, regular physical exercise & weight monitoring are important for maintaining a healthy lifestyle    You may be retaining fluid if you have a history of heart failure or if you experience any of the following symptoms:  Weight gain of 3 pounds or more overnight or 5 pounds in a week, increased swelling in our hands or feet or shortness of breath while lying flat in bed.  Please call your doctor as soon as you notice any of these symptoms; do not wait until your next office visit.

## 2024-02-07 NOTE — ANESTHESIA PRE PROCEDURE
Medication Dose Route Frequency Provider Last Rate Last Admin   • lidocaine 1 % injection 1 mL  1 mL IntraDERmal Once PRN MINA Vee MD       • acetaminophen (TYLENOL) tablet 1,000 mg  1,000 mg Oral Once MINA Vee MD       • lactated ringers IV soln infusion   IntraVENous Continuous MINA Vee MD       • sodium chloride flush 0.9 % injection 5-40 mL  5-40 mL IntraVENous 2 times per day MINA Vee MD       • sodium chloride flush 0.9 % injection 5-40 mL  5-40 mL IntraVENous PRN MINA Vee MD       • 0.9 % sodium chloride infusion   IntraVENous PRN MINA Vee MD       • midazolam PF (VERSED) injection 2 mg  2 mg IntraVENous Once PRN MINA Vee MD       • ceFAZolin (ANCEF) 2000 mg in sterile water 20 mL IV syringe  2,000 mg IntraVENous On Call to OR John Muniz MD           Allergies:    Allergies   Allergen Reactions   • Sulfa Antibiotics Other (See Comments)     \" muscle cramps\"  Muscles tighten up   • Ciprofloxacin Rash       Problem List:    Patient Active Problem List   Diagnosis Code   • Essential hypertension I10   • GERD (gastroesophageal reflux disease) K21.9   • Cervical myelopathy (HCC) G95.9   • Mass of wrist, left R22.32   • Trigger thumb of right hand M65.311   • Mass of joint of left wrist M25.832   • Extensor tenosynovitis of left wrist M65.832   • Trigger finger, right ring finger M65.341   • Left carpal tunnel syndrome G56.02   • Kidney stones N20.0   • Right ureteral stone N20.1   • Kidney stone N20.0       Past Medical History:        Diagnosis Date   • Anemia     pt denies   • GERD (gastroesophageal reflux disease)     controlled with med   • History of kidney stones 2018    X1 with surgical intervention   • History of shingles 2016    has residual outbreaks R eye   • Hypertension     controlled w/ med   • Kidney stone onset 2018   • Leg pain     right leg pain - sciatic leg pain per patient   • MSSA (methicillin susceptible

## 2024-02-08 NOTE — OP NOTE
then passed over the wire and up the ureter and into the renal pelvis.  The wire was removed and pyeloscopy was performed.  I located an approximately 8 to 10 mm stone in a mid to lower pole jesús.  A 242 laser fiber was then introduced at a setting of 800 millijoules and 8 Hz.  Laser lithotripsy was performed.  The stone was reduced to very small fragments.  Once this was accomplished, the flexible scope was removed.  The cystoscope was fed back in over the remaining wire.  A 7 x 24 double-J stent was then passed over the wire and up the ureter under fluoroscopic control and this was seen to be in appropriate position.  The wire was pulled.  It was noted its proximal end coiled in the renal pelvis, distal end coiled in the bladder.  I did leave a length of suture on the distal end of the stent to aid in removal.  It did extrude from the meatus.  The bladder was then thoroughly emptied.  All instruments were removed.  The patient was taken down out of dorsal lithotomy position, awakened, extubated, and taken to the OR without any further incident or complaint.      CHAD ANDERSEN MD      [id number]  D:  02/07/2024 18:15  T:  02/07/2024 19:24  JOB #:  [id number]

## 2024-02-08 NOTE — ANESTHESIA POSTPROCEDURE EVALUATION
Department of Anesthesiology  Postprocedure Note    Patient: Errol Smalls  MRN: [id number]  YOB: 1960  Date of evaluation: 2/7/2024    Procedure Summary     Date: 02/07/24 Room / Location: [facility] MAIN OR  [facility]ST[facility] / [facility] MAIN OR    Anesthesia Start: 1633 Anesthesia Stop: 1736    Procedure: CYSTOSCOPY, RIGHT URETEROSCOPY , LASER LITHOTRIPSY , RIGHT URETERAL STENT PLACEMENT (Right: Pelvis) Diagnosis:       Kidney stone      (Kidney stone [N20.0])    Providers: John Muniz MD Responsible Provider: Alexandre Carlton MD    Anesthesia Type: General ASA Status: 2          Anesthesia Type: General    Olga Phase I: Olga Score: 10    Olga Phase II: Olga Score: 10    Anesthesia Post Evaluation    Patient location during evaluation: PACU  Patient participation: complete - patient participated  Level of consciousness: awake and awake and alert  Airway patency: patent  Nausea & Vomiting: no nausea  Cardiovascular status: hemodynamically stable  Respiratory status: acceptable  Hydration status: euvolemic  Multimodal analgesia pain management approach  Pain management: adequate        No notable events documented.

## 2024-02-09 ENCOUNTER — OFFICE VISIT (OUTPATIENT)
Dept: UROLOGY | Age: 64
End: 2024-02-09
Payer: COMMERCIAL

## 2024-02-09 DIAGNOSIS — N39.0 ACUTE UTI: ICD-10-CM

## 2024-02-09 DIAGNOSIS — N20.1 RIGHT URETERAL STONE: Primary | ICD-10-CM

## 2024-02-09 LAB
BILIRUBIN, URINE, POC: NORMAL
BLOOD URINE, POC: NORMAL
GLUCOSE URINE, POC: NEGATIVE
KETONES, URINE, POC: 15
LEUKOCYTE ESTERASE, URINE, POC: NORMAL
NITRITE, URINE, POC: POSITIVE
PH, URINE, POC: 6 (ref 4.6–8)
PROTEIN,URINE, POC: 300
SPECIFIC GRAVITY, URINE, POC: 1.02 (ref 1–1.03)
URINALYSIS CLARITY, POC: NORMAL
URINALYSIS COLOR, POC: NORMAL
UROBILINOGEN, POC: NORMAL

## 2024-02-09 PROCEDURE — 74018 RADEX ABDOMEN 1 VIEW: CPT | Performed by: NURSE PRACTITIONER

## 2024-02-09 PROCEDURE — 99214 OFFICE O/P EST MOD 30 MIN: CPT | Performed by: NURSE PRACTITIONER

## 2024-02-09 PROCEDURE — 1123F ACP DISCUSS/DSCN MKR DOCD: CPT | Performed by: NURSE PRACTITIONER

## 2024-02-09 PROCEDURE — 81003 URINALYSIS AUTO W/O SCOPE: CPT | Performed by: NURSE PRACTITIONER

## 2024-02-09 RX ORDER — CEPHALEXIN 500 MG/1
500 CAPSULE ORAL 3 TIMES DAILY
Qty: 21 CAPSULE | Refills: 0 | Status: SHIPPED | OUTPATIENT
Start: 2024-02-09 | End: 2024-02-16

## 2024-02-09 ASSESSMENT — ENCOUNTER SYMPTOMS: BACK PAIN: 0

## 2024-02-09 NOTE — PROGRESS NOTES
SURGERY Left 10/20/2022    mass excision from the left wrist performed by Alfonso Bailey MD at [facility]    KNEE ARTHROSCOPY Bilateral 2000    L and R knee scope    LITHOTRIPSY Right 09/25/2023    EXTRACORPOREAL SHOCK WAVE LITHOTRIPSY,  RIGHT performed by Allan Moscoso MD at [facility] MAIN OR    LITHOTRIPSY Right 11/19/2018    LITHOTRIPSY Right 10/04/2019    LUMBAR DISC SURGERY  1996    POSTERIOR CERVICAL LAMINECTOMY  10/31/2019    C3-C7    TONSILLECTOMY      WISDOM TOOTH EXTRACTION       Current Outpatient Medications   Medication Sig Dispense Refill    cephALEXin (KEFLEX) 500 MG capsule Take 1 capsule by mouth 3 times daily for 7 days 21 capsule 0    HYDROcodone-acetaminophen (NORCO)  MG per tablet Take 1 tablet by mouth every 4 hours as needed for Pain for up to 30 days. Intended supply: 30 days Max Daily Amount: 6 tablets 15 tablet 0    prednisoLONE acetate (PRED FORTE) 1 % ophthalmic suspension Place 1 drop into both eyes as needed      valACYclovir (VALTREX) 1 g tablet Take 1 tablet by mouth at bedtime      ondansetron (ZOFRAN) 8 MG tablet Take 1 tablet by mouth every 8 hours as needed for Nausea or Vomiting 20 tablet 0    Zinc Sulfate (ZINC 15 PO) Take by mouth at bedtime      indomethacin (INDOCIN) 50 MG capsule Take 1 capsule by mouth at bedtime 60 capsule 3    famotidine (PEPCID) 40 MG tablet Take 1 tablet by mouth at bedtime      amLODIPine (NORVASC) 5 MG tablet Take 1 tablet by mouth at bedtime      diphenhydrAMINE (BENADRYL) 25 MG capsule Take 1 capsule by mouth nightly as needed allergies      omeprazole (PRILOSEC) 40 MG delayed release capsule Take 1 capsule by mouth nightly      tamsulosin (FLOMAX) 0.4 MG capsule Take 1 capsule by mouth nightly      zolpidem (AMBIEN) 5 MG tablet Take 1 tablet by mouth nightly as needed.       No current facility-administered medications for this visit.     Allergies   Allergen Reactions    Sulfa Antibiotics Other (See Comments)     \" muscle cramps\"  Muscles tighten

## 2024-03-05 ENCOUNTER — TELEPHONE (OUTPATIENT)
Dept: UROLOGY | Age: 64
End: 2024-03-05

## 2024-03-05 ENCOUNTER — OFFICE VISIT (OUTPATIENT)
Dept: UROLOGY | Age: 64
End: 2024-03-05
Payer: COMMERCIAL

## 2024-03-05 DIAGNOSIS — N39.0 ACUTE UTI: Primary | ICD-10-CM

## 2024-03-05 LAB
BILIRUBIN, URINE, POC: NEGATIVE
BLOOD URINE, POC: NEGATIVE
GLUCOSE URINE, POC: NEGATIVE
KETONES, URINE, POC: NEGATIVE
LEUKOCYTE ESTERASE, URINE, POC: NEGATIVE
NITRITE, URINE, POC: NEGATIVE
PH, URINE, POC: 6 (ref 4.6–8)
PROTEIN,URINE, POC: NORMAL
SPECIFIC GRAVITY, URINE, POC: 1.02 (ref 1–1.03)
URINALYSIS CLARITY, POC: NORMAL
URINALYSIS COLOR, POC: NORMAL
UROBILINOGEN, POC: NORMAL

## 2024-03-05 PROCEDURE — 81003 URINALYSIS AUTO W/O SCOPE: CPT | Performed by: NURSE PRACTITIONER

## 2024-03-05 NOTE — PROGRESS NOTES
Reason for collection: Frequency; Burning; Low back pain; Cloudy urine  Patient #: [id number]  Pharmacy: [facility] [address]        UA - Dipstick  Results for orders placed or performed in visit on 03/05/24   AMB POC URINALYSIS DIP STICK AUTO W/O MICRO   Result Value Ref Range    Color (UA POC)      Clarity (UA POC)      Glucose, Urine, POC Negative     Bilirubin, Urine, POC Negative     KETONES, Urine, POC Negative     Specific Gravity, Urine, POC 1.025 1.001 - 1.035    Blood (UA POC) Negative     pH, Urine, POC 6.0 4.6 - 8.0    Protein, Urine, POC Trace     Urobilinogen, POC 0.2 mg/dL     Nitrite, Urine, POC Negative     Leukocyte Esterase, Urine, POC Negative        UA - Micro  WBC - 0  RBC - 0  Bacteria - 0  Epith - 0      Requested Prescriptions      No prescriptions requested or ordered in this encounter     Plan    Diagnosis Orders   1. Acute UTI  AMB POC URINALYSIS DIP STICK AUTO W/O MICRO          Urine normal. Push fluids. If symptoms persist, visit for ROBY Drake, APRN - CNP

## 2024-03-11 ENCOUNTER — TELEPHONE (OUTPATIENT)
Dept: UROLOGY | Age: 64
End: 2024-03-11

## 2024-03-11 NOTE — TELEPHONE ENCOUNTER
Patient states that he received a call stating that he was scheduled for an xray. Wants a call back

## 2024-04-11 ENCOUNTER — OFFICE VISIT (OUTPATIENT)
Dept: ORTHOPEDIC SURGERY | Age: 64
End: 2024-04-11
Payer: COMMERCIAL

## 2024-04-11 DIAGNOSIS — M47.816 FACET ARTHROPATHY, LUMBAR: ICD-10-CM

## 2024-04-11 DIAGNOSIS — M54.50 LOW BACK PAIN, UNSPECIFIED BACK PAIN LATERALITY, UNSPECIFIED CHRONICITY, UNSPECIFIED WHETHER SCIATICA PRESENT: Primary | ICD-10-CM

## 2024-04-11 DIAGNOSIS — M54.16 LUMBAR RADICULOPATHY: ICD-10-CM

## 2024-04-11 DIAGNOSIS — M51.36 DDD (DEGENERATIVE DISC DISEASE), LUMBAR: ICD-10-CM

## 2024-04-11 DIAGNOSIS — M48.061 FORAMINAL STENOSIS OF LUMBAR REGION: ICD-10-CM

## 2024-04-11 PROCEDURE — 99204 OFFICE O/P NEW MOD 45 MIN: CPT | Performed by: PHYSICIAN ASSISTANT

## 2024-04-11 PROCEDURE — 1123F ACP DISCUSS/DSCN MKR DOCD: CPT | Performed by: PHYSICIAN ASSISTANT

## 2024-04-11 NOTE — PROGRESS NOTES
Name: Errol Smalls  YOB: 1960  Gender: male  MRN: [id number]    CC: Back Pain (Low back pain with radiation into right leg)       HPI: This is a 63 y.o. year old male who has history of a right L4-5 discectomy in 1996.  The past 3 months he has had pain in the right side of the lower back right lateral leg and into the calf with burning.  He has been treated by his PCP.  He has had 2-3 courses of oral steroids and the pain reduces with oral steroids but then it returns.  He has had physical therapy, NSAID, gabapentin and Tylenol.  Pain usually continues to present and is worse with standing and walking and after sitting for long period of time.  He also has history of a C3-7 posterior cervical fusion by Dr. Noe Ho for cervical stenosis.  Patient reports he did very well and had resolution of his right upper extremity pain and weakness after the surgery..    History was obtained by patient  PCP prescribed home exercise program.  He has been participating in this for 2 months.  Symptoms have not improved.        4/11/2024     3:39 PM   AMB PAIN ASSESSMENT   Location of Pain Back    Location Modifiers Right   Severity of Pain 6   Quality of Pain Aching   Duration of Pain Persistent   Frequency of Pain Constant   Date Pain First Started 1/17/2024   Aggravating Factors Standing;Walking   Limiting Behavior Some   Relieving Factors Other (Comment)    Result of Injury No   Work-Related Injury No   Are there other pain locations you wish to document? No       Significant value            ROS/Meds/PSH/PMH/FH/SH: I personally reviewed the patient's collected intake data.  Below are the pertinents:    Allergies   Allergen Reactions    Sulfa Antibiotics Other (See Comments)     \" muscle cramps\"  Muscles tighten up    Ciprofloxacin Rash         Current Outpatient Medications:     prednisoLONE acetate (PRED FORTE) 1 % ophthalmic suspension, Place 1 drop into both eyes as needed, Disp: , Rfl:

## 2024-04-25 ENCOUNTER — OFFICE VISIT (OUTPATIENT)
Dept: ORTHOPEDIC SURGERY | Age: 64
End: 2024-04-25
Payer: COMMERCIAL

## 2024-04-25 DIAGNOSIS — M51.36 DDD (DEGENERATIVE DISC DISEASE), LUMBAR: ICD-10-CM

## 2024-04-25 DIAGNOSIS — M48.062 LUMBAR STENOSIS WITH NEUROGENIC CLAUDICATION: ICD-10-CM

## 2024-04-25 DIAGNOSIS — M48.061 FORAMINAL STENOSIS OF LUMBAR REGION: Primary | ICD-10-CM

## 2024-04-25 DIAGNOSIS — M54.16 LUMBAR RADICULOPATHY: ICD-10-CM

## 2024-04-25 PROCEDURE — 1123F ACP DISCUSS/DSCN MKR DOCD: CPT | Performed by: PHYSICIAN ASSISTANT

## 2024-04-25 PROCEDURE — 99214 OFFICE O/P EST MOD 30 MIN: CPT | Performed by: PHYSICIAN ASSISTANT

## 2024-04-25 RX ORDER — TRAMADOL HYDROCHLORIDE 50 MG/1
50 TABLET ORAL EVERY 6 HOURS PRN
Qty: 20 TABLET | Refills: 0 | Status: SHIPPED | OUTPATIENT
Start: 2024-04-25 | End: 2024-04-30

## 2024-04-25 NOTE — PATIENT INSTRUCTIONS
around the area to be injected.  - The doctor will use a local anesthetic to numb the skin.  This burns like a bee sting for about 20 seconds.  As the needle is advanced, you will feel pressure.  - Once the needle is in the appropriate space, the medication will be injected.  Once the needle is removed, your back will be cleaned.  You will move back to the exam room.  - You are required to stay 20 to 30 minutes following the procedure.  Although not expected you may have some numbness from the local anesthetic.  If this were to interfere with her walking, you will not be discharged from the office until it is safe for you to leave.  - Your discharge instructions and follow-up care will be discussed with you prior to leaving the office.          PRODUCTS THAT MAY THIN THE BLOOD    Medications, over-the-counter medications and herbal supplements    Aches-N-Pain    Disalcid   Meclenofenamate   Plavix  Acetylsalicylic acid (ASA)  Topher's Pills   Meclomen    Ponstel  Actron     Dolobid   Medipren    Relefen  Advil     Dristan    Mefenamic acid   Robaxisal  Aleve     Ecotrin    Meloxicam    Rufen  [place]    Empirin   Menadol    Saleto  Anacin     Equagesic   Methocarbamol   Salsalate  Anaprox    Etodolac   Midol     Sine-aid  Ansaid     Excedrin   Mobic     Sine-off  Arthritis Pain formula   Feldene   Motrin     Sominex  Ascriptin    Fenoprofen   Nabumetone    Stan[place]  Aspergum    Feverfew   Nalfon     Sulindac  Aspirin     Florinal   Naprosyn    Talwin Compound  Cierra     Flurbiprofen   Naproxen    Ticlid  BC Powders    Genpril    Norgesic    iclopidine  Bufferin    Goody's   Nuprin     Tolectin  Cataflam    Haltrin    Nyquil     Tolmetin  Clinoril     IBU    Orudis     Toradol  Clopidogrel    Ibuprofen   Oruvail     Trental  Coricidin    Indocin    Oxaprozin    Triclosan  Coumadin    Indomethacin   Pamprin    Vanquish  Darvon Compound   Ketoprofen   Pepto Bismol    Vitamin -

## 2024-04-25 NOTE — PROGRESS NOTES
A referral for spine injection has been ordered.    
stenosis, however, there is bilateral lateral  recess and foraminal stenosis suggesting nerve root impingement in association  with anterior facet joint osteophytes. There is right laminectomy at this level.    L5-S1: Vertebral bodies intact and in alignment. Degenerative disc space  narrowing with Schmorl's nodes, endplate irregularity, circumferential disc  bulging and bilateral posterolateral disc protrusions. No significant central  canal stenosis. Bilateral disc protrusions with anterior facet joint osteophytes  result in bilateral foraminal stenosis worse on the right suggesting bilateral  nerve root impingement. Right partial laminectomy at this level.    Impression  Disc, endplate, and facet joint degenerative disease resulting in  bilateral lateral recess and foraminal stenosis at L3-4, L4-5, and L5-S1. There  is no significant central canal stenosis.  Patient had right partial laminectomy at L4 and L5 with some minimal scarring.    I have independently reviewed the MRI of the lumbar spine .  There are disc bulges at L3-4 creating mild stenosis moderate lateral recess stenosis moderate right foraminal stenosis mild left foraminal stenosis.  L4-5 large disc bulging creating moderate bilateral lateral recess stenosis and severe bilateral foraminal stenosis.  L5-S1 significant disc degeneration disc protrusion with mild central stenosis and severe foraminal stenosis on the right, moderate on the left.  Assessment/Plan:         Diagnosis Orders   1. Foraminal stenosis of lumbar region        2. Lumbar radiculopathy        3. DDD (degenerative disc disease), lumbar        4. Lumbar stenosis with neurogenic claudication                This patient's clinical history and physical exam is consistent with a right L4 and L-5 lumbar radiculopathy.  This is consistent with findings on the MRI scan where he does have quite severe foraminal stenosis L4 and 5.  There is also foraminal stenosis on the left he has bilateral

## 2024-04-29 ENCOUNTER — TELEPHONE (OUTPATIENT)
Dept: ORTHOPEDIC SURGERY | Age: 64
End: 2024-04-29

## 2024-04-30 NOTE — TELEPHONE ENCOUNTER
Spoke with patient and discussed how to prepare for injection and what do post injection.  Patient is compliant.

## 2024-05-01 ENCOUNTER — OFFICE VISIT (OUTPATIENT)
Dept: ORTHOPEDIC SURGERY | Age: 64
End: 2024-05-01
Payer: COMMERCIAL

## 2024-05-01 DIAGNOSIS — M54.16 LUMBAR RADICULOPATHY: Primary | ICD-10-CM

## 2024-05-01 PROCEDURE — 64483 NJX AA&/STRD TFRM EPI L/S 1: CPT | Performed by: PHYSICAL MEDICINE & REHABILITATION

## 2024-05-01 PROCEDURE — 64484 NJX AA&/STRD TFRM EPI L/S EA: CPT | Performed by: PHYSICAL MEDICINE & REHABILITATION

## 2024-05-01 RX ORDER — TRIAMCINOLONE ACETONIDE 40 MG/ML
160 INJECTION, SUSPENSION INTRA-ARTICULAR; INTRAMUSCULAR ONCE
Status: COMPLETED | OUTPATIENT
Start: 2024-05-01 | End: 2024-05-01

## 2024-05-01 RX ADMIN — TRIAMCINOLONE ACETONIDE 160 MG: 40 INJECTION, SUSPENSION INTRA-ARTICULAR; INTRAMUSCULAR at 08:54

## 2024-05-01 NOTE — PROGRESS NOTES
Date: 05/01/24   Name: Errol Smalls    Pre-Procedural Diagnosis:    Diagnosis Orders   1. Lumbar radiculopathy  FL NERVE BLOCK LUMBOSACRAL 1ST    FL NERVE BLOCK LUMBOSACRAL EACH ADD    triamcinolone acetonide (KENALOG-40) injection 160 mg          Procedure: Selective Nerve Root Blocks (Transforaminal) - Multiple Level    Precautions: [facility] Precautions spine injections: None.  Patient denies any prior sensitivity to steroid, local anesthetic, contrast dye, iodine or shellfish.    The procedure was discussed at length with the patient and informed consent was signed. The patient was placed in a prone position on the fluoroscopy table and the skin was prepped and draped in a routine sterile fashion. The areas to be injected were each anesthetized with approximately 5 cc of 1% Lidocaine. A 22-gauge 3.5 inch spinal needle was carefully advanced under fluoroscopic guidance to the right L4 transforaminal space and subsequently the right L5 transforaminal space. At this time 0.25 cc of omnipaque administered.. Once proper placement was confirmed, 2 cc of 0.25% Marcaine and 80 mg of Kenalog were injected through the spinal needle at each site.     Fluoroscopic guidance was used intermittently over a 10-minute period to insure proper needle placement and patient safety. A hard copy of the fluoroscopic  images has been placed in the patient's chart. The patient was monitored after the procedure and discharged home in stable fashion.     A total of 4 cc of Kenalog were administered during this procedure.    Resume Meds:   N/A    L CHARBEL MACKEY JR, MD  05/01/24

## 2024-05-15 ENCOUNTER — OFFICE VISIT (OUTPATIENT)
Dept: UROLOGY | Age: 64
End: 2024-05-15
Payer: COMMERCIAL

## 2024-05-15 DIAGNOSIS — N20.0 KIDNEY STONE: Primary | ICD-10-CM

## 2024-05-15 LAB
BILIRUBIN, URINE, POC: NEGATIVE
BLOOD URINE, POC: NORMAL
GLUCOSE URINE, POC: NEGATIVE
KETONES, URINE, POC: NEGATIVE
LEUKOCYTE ESTERASE, URINE, POC: NEGATIVE
NITRITE, URINE, POC: NEGATIVE
PH, URINE, POC: 6 (ref 4.6–8)
PROTEIN,URINE, POC: 30
SPECIFIC GRAVITY, URINE, POC: 1.03 (ref 1–1.03)
URINALYSIS CLARITY, POC: NORMAL
URINALYSIS COLOR, POC: NORMAL
UROBILINOGEN, POC: NORMAL

## 2024-05-15 PROCEDURE — 1123F ACP DISCUSS/DSCN MKR DOCD: CPT | Performed by: NURSE PRACTITIONER

## 2024-05-15 PROCEDURE — 99213 OFFICE O/P EST LOW 20 MIN: CPT | Performed by: NURSE PRACTITIONER

## 2024-05-15 PROCEDURE — 74018 RADEX ABDOMEN 1 VIEW: CPT | Performed by: NURSE PRACTITIONER

## 2024-05-15 PROCEDURE — 81003 URINALYSIS AUTO W/O SCOPE: CPT | Performed by: NURSE PRACTITIONER

## 2024-05-15 ASSESSMENT — ENCOUNTER SYMPTOMS
NAUSEA: 0
BACK PAIN: 0

## 2024-05-15 NOTE — PROGRESS NOTES
AMB POC XRAY ABDOMEN 1 VIEW          Urine normal. KUB reviewed. Cont conservative prevention of UTI. RTO in 6 months for OV with KUB. Advised to call sooner if symptoms worsen.    CARIN Dumont - CNP  Dr. Youssef is supervising physician today and he approves plan of care.      I have spent 21 minutes today reviewing previous notes, test results and face to face with the patient as well as documenting.

## 2024-05-29 ENCOUNTER — OFFICE VISIT (OUTPATIENT)
Age: 64
End: 2024-05-29
Payer: COMMERCIAL

## 2024-05-29 VITALS — HEIGHT: 68 IN | WEIGHT: 178 LBS | BODY MASS INDEX: 26.98 KG/M2

## 2024-05-29 DIAGNOSIS — M48.062 LUMBAR STENOSIS WITH NEUROGENIC CLAUDICATION: Primary | ICD-10-CM

## 2024-05-29 DIAGNOSIS — M51.36 DDD (DEGENERATIVE DISC DISEASE), LUMBAR: ICD-10-CM

## 2024-05-29 DIAGNOSIS — M48.061 FORAMINAL STENOSIS OF LUMBAR REGION: ICD-10-CM

## 2024-05-29 DIAGNOSIS — M25.562 ACUTE PAIN OF LEFT KNEE: ICD-10-CM

## 2024-05-29 PROCEDURE — 99213 OFFICE O/P EST LOW 20 MIN: CPT | Performed by: PHYSICIAN ASSISTANT

## 2024-05-29 PROCEDURE — 1123F ACP DISCUSS/DSCN MKR DOCD: CPT | Performed by: PHYSICIAN ASSISTANT

## 2024-05-29 NOTE — PROGRESS NOTES
Name: Errol Smalls  YOB: 1960  Gender: male  MRN: [id number]    CC: Follow-up       HPI: This is a 63 y.o. year old male who has history of a right L4-5 discectomy in 1996.  The past 3 months he has had pain in the right side of the lower back right lateral leg and into the calf with burning.  He has been treated by his PCP.  He has had 2-3 courses of oral steroids and the pain reduces with oral steroids but then it returns.  He has had physical therapy, NSAID, gabapentin and Tylenol.  Pain usually continues to present and is worse with standing and walking and after sitting for long period of time.  He also has history of a C3-7 posterior cervical fusion by Dr. Noe Ho for cervical stenosis.  Patient reports he did very well and had resolution of his right upper extremity pain and weakness after the surgery..    History was obtained by patient  PCP prescribed home exercise program.  He has been participating in this for 2 months.  Symptoms have not improved.    he had right L4 and 5 radicular symptoms likely from foraminal stenosis.  Ordered new MRI scan to further evaluate.  There are disc bulges at L3-4 creating mild stenosis moderate lateral recess stenosis moderate right foraminal stenosis mild left foraminal stenosis.  L4-5 large disc bulging creating moderate bilateral lateral recess stenosis and severe bilateral foraminal stenosis.  L5-S1 significant disc degeneration disc protrusion with mild central stenosis and severe foraminal stenosis on the right, moderate on the left.    He had right L4 and 5 selective nerve root block with Dr. Suresh on May 1, 2024.  He reports that today after the injection he had 100% relief of his symptoms.  Right now he is doing very well and no residual right leg pain.    He did tweak his left knee when he was lifting a box and twisted a couple days ago.  He may need further evaluation of this but right now he feels like it may be subsiding on its

## 2024-10-07 ENCOUNTER — OFFICE VISIT (OUTPATIENT)
Dept: UROLOGY | Age: 64
End: 2024-10-07
Payer: COMMERCIAL

## 2024-10-07 ENCOUNTER — TELEPHONE (OUTPATIENT)
Dept: UROLOGY | Age: 64
End: 2024-10-07

## 2024-10-07 DIAGNOSIS — R10.9 ABDOMINAL PAIN, UNSPECIFIED ABDOMINAL LOCATION: ICD-10-CM

## 2024-10-07 DIAGNOSIS — N20.0 KIDNEY STONE: Primary | ICD-10-CM

## 2024-10-07 DIAGNOSIS — R11.0 NAUSEA: ICD-10-CM

## 2024-10-07 LAB
BILIRUBIN, URINE, POC: NEGATIVE
BLOOD URINE, POC: NEGATIVE
GLUCOSE URINE, POC: NEGATIVE MG/DL
KETONES, URINE, POC: NEGATIVE MG/DL
LEUKOCYTE ESTERASE, URINE, POC: NEGATIVE
NITRITE, URINE, POC: NEGATIVE
PH, URINE, POC: 6.5 (ref 4.6–8)
PROTEIN,URINE, POC: NEGATIVE MG/DL
SPECIFIC GRAVITY, URINE, POC: 1.02 (ref 1–1.03)
URINALYSIS CLARITY, POC: NORMAL
URINALYSIS COLOR, POC: NORMAL
UROBILINOGEN, POC: NORMAL MG/DL

## 2024-10-07 PROCEDURE — 81003 URINALYSIS AUTO W/O SCOPE: CPT | Performed by: NURSE PRACTITIONER

## 2024-10-07 PROCEDURE — 99214 OFFICE O/P EST MOD 30 MIN: CPT | Performed by: NURSE PRACTITIONER

## 2024-10-07 PROCEDURE — 1123F ACP DISCUSS/DSCN MKR DOCD: CPT | Performed by: NURSE PRACTITIONER

## 2024-10-07 PROCEDURE — 74018 RADEX ABDOMEN 1 VIEW: CPT | Performed by: NURSE PRACTITIONER

## 2024-10-07 ASSESSMENT — ENCOUNTER SYMPTOMS
ABDOMINAL PAIN: 1
BACK PAIN: 0
NAUSEA: 1

## 2024-10-07 NOTE — PROGRESS NOTES
Substance and Sexual Activity    Alcohol use: No    Drug use: Never    Sexual activity: Not on file   Other Topics Concern    Not on file   Social History Narrative    Not on file     Social Determinants of Health     Financial Resource Strain: Not on file   Food Insecurity: Not on file   Transportation Needs: Not on file   Physical Activity: Not on file   Stress: Not on file   Social Connections: Unknown (3/20/2021)    Received from [facility]    Social Connections     Frequency of Communication with Friends and Family: Not asked     Frequency of Social Gatherings with Friends and Family: Not asked   Intimate Partner Violence: Unknown (3/20/2021)    Received from [facility]    Intimate Partner Violence     Fear of Current or Ex-Partner: Not asked     Emotionally Abused: Not asked     Physically Abused: Not asked     Sexually Abused: Not asked   Housing Stability: Not At Risk (3/9/2022)    Received from [facility]    Housing Stability     Was there a time when you did not have a steady place to sleep: Not asked     Worried that the place you are staying is making you sick: Not asked     Family History   Problem Relation Age of Onset    Cancer Sister         Pancreatic Cancer    Hypertension Sister     Hypertension Sister     Hypertension Brother     Hypertension Sister     Lung Disease Father     Stroke Father     Stroke Mother     Diabetes Mother         po meds only    Hypertension Mother        Review of Systems  Constitutional:   Negative for fever.  GI: Positive for nausea and abdominal pain.  Genitourinary: Positive for history of urolithiasis.  Musculoskeletal:  Negative for back pain.      Urinalysis  UA - Dipstick  Results for orders placed or performed in visit on 10/07/24   AMB POC URINALYSIS DIP STICK AUTO W/O MICRO   Result Value Ref Range    Color (UA POC)      Clarity (UA POC)      Glucose, Urine, POC Negative Negative mg/dL    Bilirubin, Urine,

## 2024-10-07 NOTE — TELEPHONE ENCOUNTER
Patient called stating he has had previous kidney stones and is experiencing back/r leg pain and not able to empty his bladder every time he goes to the restroom, then will be right back in the restroom trying to go again. He states he thinks he may have passed a stone/a part of a stone. Please give him a call to discuss symptoms.

## 2024-10-14 ENCOUNTER — OFFICE VISIT (OUTPATIENT)
Age: 64
End: 2024-10-14
Payer: COMMERCIAL

## 2024-10-14 DIAGNOSIS — M43.16 SPONDYLOLISTHESIS OF LUMBAR REGION: ICD-10-CM

## 2024-10-14 DIAGNOSIS — M48.062 LUMBAR STENOSIS WITH NEUROGENIC CLAUDICATION: Primary | ICD-10-CM

## 2024-10-14 DIAGNOSIS — M48.061 FORAMINAL STENOSIS OF LUMBAR REGION: ICD-10-CM

## 2024-10-14 PROCEDURE — 1123F ACP DISCUSS/DSCN MKR DOCD: CPT | Performed by: PHYSICIAN ASSISTANT

## 2024-10-14 PROCEDURE — 99214 OFFICE O/P EST MOD 30 MIN: CPT | Performed by: PHYSICIAN ASSISTANT

## 2024-10-14 RX ORDER — AZITHROMYCIN 250 MG/1
TABLET, FILM COATED ORAL
COMMUNITY
Start: 2024-09-17

## 2024-10-14 RX ORDER — CYCLOBENZAPRINE HCL 10 MG
10 TABLET ORAL
COMMUNITY
Start: 2024-03-17

## 2024-10-14 NOTE — PROGRESS NOTES
Name: Errol Smalls  YOB: 1960  Gender: male  MRN: [id number]    CC: Back Problem       HPI: This is a 64 y.o. year old male who has history of a right L4-5 discectomy in 1996.  Spring 2024, he had onset of lower back pain right lateral leg pain into the calf.  He has had 2-3 courses of oral steroids and the pain reduces with oral steroids but then it returns.  He has had physical therapy, NSAID, gabapentin and Tylenol.    He has history of a C3-7 posterior cervical fusion by Dr. Noe Ho for cervical stenosis.  Patient reports he did very well and had resolution of his right upper extremity pain and weakness after the surgery..    MRI scan to further evaluate.  There are disc bulges at L3-4 creating mild stenosis moderate lateral recess stenosis moderate right foraminal stenosis mild left foraminal stenosis.  L4-5 large disc bulging creating moderate bilateral lateral recess stenosis and severe bilateral foraminal stenosis.  L5-S1 significant disc degeneration, disc protrusion with mild central stenosis and severe foraminal stenosis on the right, moderate on the left.    He had right L4 and 5 selective nerve root block with Dr. Suresh on May 1, 2024.  He had 100% relief of his right leg pain.  10/14/24 patient presents with pain bilateral posterior lateral legs wrapping around to his shins.  His legs feel heavy and he has buttock pain after sitting for long time when he starts to get up or after standing and walking for period of time.  He is on indomethacin.  He does report the last injection significantly helped his right radicular leg pain and it lasted a good 3 months but now he is got pain in bilateral legs.          4/11/2024     3:39 PM   AMB PAIN ASSESSMENT   Location of Pain Back    Location Modifiers Right   Severity of Pain 6   Quality of Pain Aching   Duration of Pain Persistent   Frequency of Pain Constant   Date Pain First Started 1/17/2024   Aggravating Factors

## 2024-10-14 NOTE — PATIENT INSTRUCTIONS
Epidural Steroid Injection / Selective Nerve Root Block  What is it?  An epidural steroid injection (OPAL) is an injection of an anti-inflammatory medication directly on the sac that covers the spinal cord and nerves. A Selective Nerve Root Block (SNRB) is an injection that is directed around a specific nerve.   Your physician usually orders an injection  when you have symptoms of an irritated spinal nerve. These symptoms can include back, buttock or leg pain, weakness, or numbness.  Irritated spinal nerves are often caused by disc herniations or a tight spinal canal (stenosis).     The goal of the steroid injection is to reduce the inflammation around the spinal cord and nerves, which should reduce the amount of back and leg pain you are experiencing.    Epidural injections are often done in series.  It would not be unusual to have two or three injections in a row 10 to 14 days apart.  The reason for multiple injections is that the relief from the injection may wear off over time. And sometimes it takes multiple doses of steroid injection to obtain the most anti-inflammatory effect around the nerves.     How is it performed?  You will be asked to lie on your side or stomach on the x-ray table.  This will allow the physician to position you in the best way possible to access your back.  Next, the skin will be cleaned and numbed with a local anesthetic.  An X-ray machine will then be used to guide a small gauge needle into the space over your spinal sac. A small amount of dye will then be injected to insure the needle is in the proper position.  Finally, a mixture of numbing medicine and anti-inflammatory (steroid/cortisone) will be injected.      How long does it take?  All together it should take about 1 hour.  The back and legs may feel weak or numb for a couple of hours after the injection.   Plan the have someone drive you home.      Are there any restrictions after the OPAL?   Try to spend the remainder of

## 2024-10-16 ENCOUNTER — HOSPITAL ENCOUNTER (OUTPATIENT)
Dept: CT IMAGING | Age: 64
Discharge: HOME | End: 2024-10-19
Payer: COMMERCIAL

## 2024-10-16 DIAGNOSIS — R10.9 ABDOMINAL PAIN, UNSPECIFIED ABDOMINAL LOCATION: ICD-10-CM

## 2024-10-16 DIAGNOSIS — N20.0 KIDNEY STONE: ICD-10-CM

## 2024-10-16 PROCEDURE — 74176 CT ABD & PELVIS W/O CONTRAST: CPT

## 2024-10-16 PROCEDURE — 74176 CT ABD & PELVIS W/O CONTRAST: CPT | Performed by: RADIOLOGY

## 2024-10-30 ENCOUNTER — OFFICE VISIT (OUTPATIENT)
Dept: ORTHOPEDIC SURGERY | Age: 64
End: 2024-10-30
Payer: COMMERCIAL

## 2024-10-30 DIAGNOSIS — M54.16 LUMBAR RADICULOPATHY: Primary | ICD-10-CM

## 2024-10-30 RX ORDER — TRIAMCINOLONE ACETONIDE 40 MG/ML
100 INJECTION, SUSPENSION INTRA-ARTICULAR; INTRAMUSCULAR ONCE
Status: COMPLETED | OUTPATIENT
Start: 2024-10-30 | End: 2024-10-30

## 2024-10-30 RX ADMIN — TRIAMCINOLONE ACETONIDE 100 MG: 40 INJECTION, SUSPENSION INTRA-ARTICULAR; INTRAMUSCULAR at 08:42

## 2024-10-30 NOTE — PROGRESS NOTES
guidance was used intermittently over a 10-minute period to insure proper needle placement and patient safety. A hard copy of the fluoroscopic  images has been placed in the patient's chart. The patient was monitored after the procedure and discharged home without complication.     A total of 2.5 cc of Kenalog were administered during this procedure.    Resume Meds:  N/A    MINA MACKEY JR, MD  10/30/24

## 2024-11-14 ENCOUNTER — OFFICE VISIT (OUTPATIENT)
Dept: UROLOGY | Age: 64
End: 2024-11-14
Payer: COMMERCIAL

## 2024-11-14 DIAGNOSIS — N20.0 KIDNEY STONE: Primary | ICD-10-CM

## 2024-11-14 DIAGNOSIS — N28.9 KIDNEY LESION: ICD-10-CM

## 2024-11-14 PROCEDURE — 1123F ACP DISCUSS/DSCN MKR DOCD: CPT | Performed by: NURSE PRACTITIONER

## 2024-11-14 PROCEDURE — 99214 OFFICE O/P EST MOD 30 MIN: CPT | Performed by: NURSE PRACTITIONER

## 2024-11-14 ASSESSMENT — ENCOUNTER SYMPTOMS
BACK PAIN: 0
NAUSEA: 0

## 2024-11-14 NOTE — PROGRESS NOTES
appearance - well appearing and in no distress  Mental status - alert, oriented to person, place, and time  Neck - supple, no significant adenopathy  Chest/Lung-  Quiet, even and easy respiratory effort without use of accessory muscles  Skin - normal coloration and turgor, no rashes      Assessment and Plan    ICD-10-CM    1. Kidney stone  N20.0       2. Kidney lesion  N28.9           Kidney stone- CT reviewed. Cont to follow kidney stones. Norco 10 mg PO q 6 hr PRN pain.    Kidney lesion- SHANELL ordered. Patient advised to call and schedule. Will notify patient of results.    RTO in 6 months for OV with KUB. Advised to call sooner if symptoms worsen.    Maria R Drake, APRN - CNP  Dr. Youssef is supervising physician today and he approves plan of care.

## 2024-11-21 ENCOUNTER — OFFICE VISIT (OUTPATIENT)
Age: 64
End: 2024-11-21
Payer: COMMERCIAL

## 2024-11-21 DIAGNOSIS — M43.16 SPONDYLOLISTHESIS OF LUMBAR REGION: ICD-10-CM

## 2024-11-21 DIAGNOSIS — M54.16 LUMBAR RADICULOPATHY: Primary | ICD-10-CM

## 2024-11-21 DIAGNOSIS — M48.062 LUMBAR STENOSIS WITH NEUROGENIC CLAUDICATION: ICD-10-CM

## 2024-11-21 PROCEDURE — 1123F ACP DISCUSS/DSCN MKR DOCD: CPT | Performed by: ORTHOPAEDIC SURGERY

## 2024-11-21 PROCEDURE — 99214 OFFICE O/P EST MOD 30 MIN: CPT | Performed by: ORTHOPAEDIC SURGERY

## 2024-11-21 NOTE — PROGRESS NOTES
tamsulosin (FLOMAX) 0.4 MG capsule, Take 1 capsule by mouth nightly, Disp: , Rfl:     zolpidem (AMBIEN) 5 MG tablet, Take 1 tablet by mouth nightly as needed., Disp: , Rfl:     Allergies:    Allergies   Allergen Reactions    Sulfa Antibiotics Other (See Comments)     \" muscle cramps\"  Muscles tighten up    Ciprofloxacin Rash         Physical Exam:     This is a well developed well nourished male adult in no acute distress.     Mood and affect are appropriate.    Oriented to person, place, and time.    Respirations are unlabored and there is no evidence of cyanosis    The patient ambulates with an antalgic gait.     There is minimal hip irritability with internal or external rotation bilaterally.      There is subjective tingling over the bilateral  buttocks and posterior thighs..    Reflexes   Right Left   Quadriceps (L4) 2 2   Achilles (S1) 2 2     Strength testing in the lower extremity reveals the following based on the 5 point grading scale:     HF (L2) H Ab (L5) KE (L3/4) ADF (L4) EHL (L5) A Ev (S1) APF (S1)   Right 5 4 5 5 4 5 5   Left 5 5 5 5 5 5 5        Radiographic Studies:     X-rays including AP and lateral views of the lumbar spine were reviewed and interpreted:     Significant loss of disc height and loss of lordosis at L4-L5 and L5-S1.    Radiographic Impression:    Loss of disc height and lordosis as outlined above    MRI of the lumbar spine images were reviewed and interpreted: He has degenerative disc pathology at L3-L4, L4-L5, and L5-S1 with near complete loss of disc height at L4-L5 and significant loss of disc height at L5-S1 and loss of lordosis.  There is severe foraminal stenosis bilaterally at each of those levels.    Diagnosis:      ICD-10-CM    1. Lumbar radiculopathy  M54.16       2. Lumbar stenosis with neurogenic claudication  M48.062       3. Spondylolisthesis of lumbar region  M43.16           Assessment/Plan:      This patient's clinical history and physical exam is consistent multi

## 2025-02-03 ENCOUNTER — TELEPHONE (OUTPATIENT)
Dept: ORTHOPEDIC SURGERY | Age: 65
End: 2025-02-03

## 2025-02-03 DIAGNOSIS — M54.16 LUMBAR RADICULOPATHY: Primary | ICD-10-CM

## 2025-02-03 NOTE — TELEPHONE ENCOUNTER
Discussed with patient that I was going by Dr Cheatham's last note with the injection that he could schedule while waiting on surgery. He understands and wants to proceed.

## 2025-02-19 ENCOUNTER — OFFICE VISIT (OUTPATIENT)
Dept: ORTHOPEDIC SURGERY | Age: 65
End: 2025-02-19

## 2025-02-19 DIAGNOSIS — M54.16 LUMBAR RADICULOPATHY: Primary | ICD-10-CM

## 2025-02-19 RX ORDER — TRIAMCINOLONE ACETONIDE 40 MG/ML
40 INJECTION, SUSPENSION INTRA-ARTICULAR; INTRAMUSCULAR ONCE
Status: COMPLETED | OUTPATIENT
Start: 2025-02-19 | End: 2025-02-19

## 2025-02-19 RX ADMIN — TRIAMCINOLONE ACETONIDE 40 MG: 40 INJECTION, SUSPENSION INTRA-ARTICULAR; INTRAMUSCULAR at 08:38

## 2025-02-19 NOTE — PROGRESS NOTES
Date: 02/19/25   Name: Errol Smalls    Pre-Procedural Diagnosis:    Diagnosis Orders   1. Lumbar radiculopathy  FL NERVE BLOCK LUMBOSACRAL 1ST    triamcinolone acetonide (KENALOG-40) injection 40 mg          Procedure: Bilateral Selective Nerve Root Blocks (Transforaminal) - Single Level    I have reviewed prior lumbar spine radiographs that reveal 5 non rib-bearing lumbar vertebrae., I have reviewed clinician's notes and orders placed., and I have personally reviewed with patient the informed consent for operation/procedure per [facility] protocol.  This involves risks and benefits of procedure, potential for success/improvement of injections,qualifications of physician performing procedure.  Consent form addressed appropriate local anesthesia, emergent blood transfusion, clarification of DNR status.  This form was signed by all appropriate parties and scanned into the medical record. Note that is not appropriate for me to discuss spine surgical issues or other treatment options if I am not the primary clinician.  If I am the ordering clinician, those issues would have been discussed at the appropriate office visit or at upcoming encounter.     Precautions: [facility] Precautions spine injections: None.  Patient denies any prior sensitivity to steroid, local anesthetic, contrast dye, iodine or shellfish.    The procedure was discussed at length with the patient and informed consent was signed. The patient was placed in a prone position on the fluoroscopy table and the skin was prepped and draped in a routine sterile fashion. The areas to be injected was/were anesthetized with approximately 5 cc of 1% Lidocaine. A 22-gauge 3.5 inch spinal needle was carefully advanced under fluoroscopic guidance to the bilateral L4 transforaminal spaces.  At this time 0.25 cc of omnipaque administered.  Once proper placement was confirmed, 2 cc of 0.25% Marcaine and 80 mg of Kenalog were injected through the spinal needle at each

## 2025-03-19 NOTE — DISCHARGE INSTRUCTIONS
Moniter blood pressure twice daily and keep a log and take to follow up appointment      Soft diet     MAY shower . However please avoid  water on the  incision and pat the area dry after bathing     NO tub bathing    NO swimming or hot tubs    LEAVE incision open to air. Do not apply antibodic ointments to incision. NO lotions powders or creams in the area of incision    NO lifting anything heavier than 5LBS     NO Bending, Lifting or Twisting    May move head right to left but avoid looking up     Avoid reaching above head    Wear cervical collar if prescribed    NO driving until directed by your doctor    Avoid sitting more than 20 - 30 minutes at a time    DO NOT take any NSAIDS (either prescribed or over the counter until directed    (Aleve, Ibuprofen, Mobic, etc) as this will interfere with bone healing    CALL Dr Daniel Mueller if:  Fever >100.5  ([phone])                 Incision becomes red, swollen or  opens up               Incision has yellow, thick drainage or an odor               Pain is not managed with prescribed medications               Excessive nausea and/or vomiting    Call Dr Daniel Mueller for any new numbness, tingling or weakness of extremities    Avoid having pets sleep in bed with you until incision is completely healed          DISCHARGE SUMMARY from Nurse    PATIENT INSTRUCTIONS:    After general anesthesia or intravenous sedation, for 24 hours or while taking prescription Narcotics:  · Limit your activities  · Do not drive and operate hazardous machinery  · Do not make important personal or business decisions  · Do  not drink alcoholic beverages  · If you have not urinated within 8 hours after discharge, please contact your surgeon on call.     Report the following to your surgeon:  · Excessive pain, swelling, redness or odor of or around the surgical area  · Temperature over 100.5  · Nausea and vomiting lasting longer than 4 hours or if unable to take medications  · Any signs of decreased circulation Patient contacts clinic reporting no improvement in her fatigue.  Seen 02/25, labs essentially within normal limits, patient requesting follow up.  Has travel planned in April, unable to come in 04/10 (open slot).  Requesting sooner appointment, please advise if ok to use res 24.     or nerve impairment to extremity: change in color, persistent  numbness, tingling, coldness or increase pain  · Any questions    What to do at Home:  Recommended activity: see disc harg eirome    If you experience any of the following symptoms see discharge instrtuctions, please follow up with surgeon. *  Please give a list of your current medications to your Primary Care Provider. *  Please update this list whenever your medications are discontinued, doses are      changed, or new medications (including over-the-counter products) are added. *  Please carry medication information at all times in case of emergency situations. These are general instructions for a healthy lifestyle:    No smoking/ No tobacco products/ Avoid exposure to second hand smoke  Surgeon General's Warning:  Quitting smoking now greatly reduces serious risk to your health. Obesity, smoking, and sedentary lifestyle greatly increases your risk for illness    A healthy diet, regular physical exercise & weight monitoring are important for maintaining a healthy lifestyle    You may be retaining fluid if you have a history of heart failure or if you experience any of the following symptoms:  Weight gain of 3 pounds or more overnight or 5 pounds in a week, increased swelling in our hands or feet or shortness of breath while lying flat in bed. Please call your doctor as soon as you notice any of these symptoms; do not wait until your next office visit. The discharge information has been reviewed with the patient. The patient verbalized understanding. Discharge medications reviewed with the patient and appropriate educational materials and side effects teaching were provided.   ___________________________________________________________________________________________________________________________________

## 2025-04-07 ENCOUNTER — TELEPHONE (OUTPATIENT)
Dept: UROLOGY | Age: 65
End: 2025-04-07

## 2025-04-07 NOTE — TELEPHONE ENCOUNTER
Pt left vm stating he would like to make an appt to come in and be seen for urinating issues. Pt states he is uncomfortable going to bathroom. Call was returned but received no answer, left vm for call back.

## 2025-04-30 ENCOUNTER — OFFICE VISIT (OUTPATIENT)
Dept: ORTHOPEDIC SURGERY | Age: 65
End: 2025-04-30

## 2025-04-30 VITALS — WEIGHT: 182 LBS | HEIGHT: 67 IN | BODY MASS INDEX: 28.56 KG/M2

## 2025-04-30 DIAGNOSIS — M17.11 PRIMARY OSTEOARTHRITIS OF RIGHT KNEE: ICD-10-CM

## 2025-04-30 DIAGNOSIS — M25.561 RIGHT KNEE PAIN, UNSPECIFIED CHRONICITY: Primary | ICD-10-CM

## 2025-04-30 DIAGNOSIS — M25.562 LEFT KNEE PAIN, UNSPECIFIED CHRONICITY: ICD-10-CM

## 2025-04-30 DIAGNOSIS — M17.12 PRIMARY OSTEOARTHRITIS OF LEFT KNEE: ICD-10-CM

## 2025-04-30 RX ORDER — METHYLPREDNISOLONE ACETATE 40 MG/ML
40 INJECTION, SUSPENSION INTRA-ARTICULAR; INTRALESIONAL; INTRAMUSCULAR; SOFT TISSUE ONCE
Status: COMPLETED | OUTPATIENT
Start: 2025-04-30 | End: 2025-04-30

## 2025-04-30 RX ADMIN — METHYLPREDNISOLONE ACETATE 40 MG: 40 INJECTION, SUSPENSION INTRA-ARTICULAR; INTRALESIONAL; INTRAMUSCULAR; SOFT TISSUE at 09:16

## 2025-04-30 NOTE — PROGRESS NOTES
Name: Errol Smalls  YOB: 1960  Gender: male  MRN: [id number]    CC: Bilateral knee pain    HPI: Errol Smalls is a 64 y.o. male who presents with concerns with both knees.  He states about a month ago he fell and his right knee has been much more painful than his left since then.  He states his right knee was giving him some trouble prior to that but this certainly aggravated things.  He does have a history of what sounds like right knee arthroscopy many years ago.  His left knee had an open arthrotomy many more years previously.  He has a history of significant lumbar spine issues as well for which he has seen both Dr. Cheatham and Dr. Suresh.  He had another opinion with Dr. Castillo recently.  He has also had cervical spine surgery.  He denies numbness or ting down the legs but does have some intermittent back pain currently.    He describes a bit more swelling in the right knee than the left.  He has some difficulty getting up and down and finds weightbearing activities more problematic.  He finds that he has more grinding in the left knee but it is less painful.    He does continue to work in the [employer] business for [employer].  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  He remains active.    History was obtained from patient    ROS/Meds/PSH/PMH/FH/SH: I personally reviewed the patients standard intake form.  Below are the pertinents    Tobacco:  reports that he has never smoked. He has never used smokeless tobacco.  Past Medical History:   Diagnosis Date    Anemia     pt denies    GERD (gastroesophageal reflux disease)     controlled with med    History of kidney stones 2018    X1 with surgical intervention    History of shingles 2016    has residual outbreaks R eye    Hypertension     controlled w/ med    Kidney stone onset 2018    Leg pain     right leg pain - sciatic leg pain per patient    MSSA (methicillin susceptible Staphylococcus aureus)

## 2025-05-14 ENCOUNTER — OFFICE VISIT (OUTPATIENT)
Dept: UROLOGY | Age: 65
End: 2025-05-14
Payer: COMMERCIAL

## 2025-05-14 DIAGNOSIS — N28.9 KIDNEY LESION: ICD-10-CM

## 2025-05-14 DIAGNOSIS — N20.0 KIDNEY STONE: Primary | ICD-10-CM

## 2025-05-14 LAB
BILIRUBIN, URINE, POC: NEGATIVE
BLOOD URINE, POC: NORMAL
GLUCOSE URINE, POC: NEGATIVE MG/DL
KETONES, URINE, POC: NEGATIVE MG/DL
LEUKOCYTE ESTERASE, URINE, POC: NEGATIVE
NITRITE, URINE, POC: NEGATIVE
PH, URINE, POC: 7 (ref 4.6–8)
PROTEIN,URINE, POC: NEGATIVE MG/DL
SPECIFIC GRAVITY, URINE, POC: 1.02 (ref 1–1.03)
URINALYSIS CLARITY, POC: NORMAL
URINALYSIS COLOR, POC: NORMAL
UROBILINOGEN, POC: NORMAL MG/DL

## 2025-05-14 PROCEDURE — 1123F ACP DISCUSS/DSCN MKR DOCD: CPT | Performed by: NURSE PRACTITIONER

## 2025-05-14 PROCEDURE — 81003 URINALYSIS AUTO W/O SCOPE: CPT | Performed by: NURSE PRACTITIONER

## 2025-05-14 PROCEDURE — 99214 OFFICE O/P EST MOD 30 MIN: CPT | Performed by: NURSE PRACTITIONER

## 2025-05-14 PROCEDURE — 74018 RADEX ABDOMEN 1 VIEW: CPT | Performed by: NURSE PRACTITIONER

## 2025-05-14 PROCEDURE — 1160F RVW MEDS BY RX/DR IN RCRD: CPT | Performed by: NURSE PRACTITIONER

## 2025-05-14 PROCEDURE — 1159F MED LIST DOCD IN RCRD: CPT | Performed by: NURSE PRACTITIONER

## 2025-05-14 RX ORDER — HYDROCODONE BITARTRATE AND ACETAMINOPHEN 10; 325 MG/1; MG/1
1 TABLET ORAL EVERY 6 HOURS PRN
Qty: 12 TABLET | Refills: 0 | Status: SHIPPED | OUTPATIENT
Start: 2025-05-14 | End: 2025-05-17

## 2025-05-14 ASSESSMENT — ENCOUNTER SYMPTOMS
NAUSEA: 0
BACK PAIN: 0

## 2025-05-14 NOTE — PROGRESS NOTES
[facility]  [address]  [place] [address]  [phone]          Errol Smalls  : 1960    Chief Complaint   Patient presents with    Follow-up          HPI     Errol Smalls is a 64 y.o. male who is followed for renal stone. Last seen in  by Dr Azul. R ESWL in 2018. He has also had previous URS.      S/P R ESWL 23. KUB in office 10/17/23 showed R sided ureteral stone back in RLP. Found to have 9 mm right prox stone. He is s/p R URS w stent placement on 24 by Dr. Muniz. This was removed 24.     CT on 10/16/24. This showed small R renal stones wo obstruction. He has since seen ortho and having back worked up. **of note, CT showed cysts on both kidneys. SHANELL recommended. Has not had this yet.           Past Medical History:   Diagnosis Date    Anemia     pt denies    GERD (gastroesophageal reflux disease)     controlled with med    History of kidney stones 2018    X1 with surgical intervention    History of shingles 2016    has residual outbreaks R eye    Hypertension     controlled w/ med    Kidney stone onset 2018    Leg pain     right leg pain - sciatic leg pain per patient    MSSA (methicillin susceptible Staphylococcus aureus) 10/23/2019    postive nasal swab     Past Surgical History:   Procedure Laterality Date    CATARACT REMOVAL Right     CHOLECYSTECTOMY, LAPAROSCOPIC  2010    COLONOSCOPY      CYSTOSCOPY Right 2024    CYSTOSCOPY, RIGHT URETEROSCOPY , LASER LITHOTRIPSY , RIGHT URETERAL STENT PLACEMENT performed by John Muniz MD at [facility] MAIN OR    CYSTOSCOPY W/ LASER LITHOTRIPSY Left 2020    HAND SURGERY Left 10/20/2022    mass excision from the left wrist performed by Alfonso Bailey MD at [facility] O[facility]    KNEE ARTHROSCOPY Bilateral     L and R knee scope    LITHOTRIPSY Right 2023    EXTRACORPOREAL SHOCK WAVE LITHOTRIPSY,  RIGHT performed by Allan Moscoso MD at [facility] MAIN OR    LITHOTRIPSY Right

## 2025-06-04 ENCOUNTER — TELEPHONE (OUTPATIENT)
Dept: ORTHOPEDIC SURGERY | Age: 65
End: 2025-06-04

## 2025-06-05 ENCOUNTER — TELEPHONE (OUTPATIENT)
Dept: ORTHOPEDIC SURGERY | Age: 65
End: 2025-06-05

## 2025-06-05 NOTE — TELEPHONE ENCOUNTER
Called spoke to patient states he has an appointment to get knee injections tomorrow with Dr. Leon and he thinks the patient is coming from his knees to his back. Patient states if he has knee surgery that would be first.    Patient states he will have back surgery by Dr. Field and when he heals from his knees surgery and he's transferring care. Patient was advised to call Dr. Martinez office if he has issues with his back.due to transferring care. Patient verbalized understanding.

## 2025-06-06 ENCOUNTER — OFFICE VISIT (OUTPATIENT)
Dept: ORTHOPEDIC SURGERY | Age: 65
End: 2025-06-06

## 2025-06-06 DIAGNOSIS — M17.11 PRIMARY OSTEOARTHRITIS OF RIGHT KNEE: ICD-10-CM

## 2025-06-06 DIAGNOSIS — M17.12 PRIMARY OSTEOARTHRITIS OF LEFT KNEE: Primary | ICD-10-CM

## 2025-06-06 NOTE — PROGRESS NOTES
Name: Errol Smalls  YOB: 1960  Gender: male  MRN: [id number]     CC: BILATERAL Knee Osteoarthritis      PROCEDURE: #1 of 3 Hyaluronic Injection    The patient's name and date of birth were confirmed prior to the injection.  The injection site was also confirmed with the patient prior to the procedure. After discussion of risks and benefits including but not limited to pain, infection, skin discoloration, and injury to blood vessels or nerves the patient verbally consented to proceed with injection of the BILATERAL.  The patient is to restrict their activity for 48 hours.    Radiology Report: US guidance was used to examine the joint, ensure adequate needle placement and to decrease the risk of joint aggravation. The intracondylar notch, retropatellar fat pad, patella tendon, patella and tibia were all visualized. Pre and post injection US still images were obtained and placed in the record. Image were obtained with a MobileAware ultrasound transducer (model [id number]).    Procedure Note: After steriley prepping the BILATERAL knee, it was injected with a 2mL of Euflexxa and the medication was observed going into the intra-articular space via US guidance.    The patient tolerated the procedure without difficulty.    ALKA Hylton

## 2025-06-13 ENCOUNTER — OFFICE VISIT (OUTPATIENT)
Dept: ORTHOPEDIC SURGERY | Age: 65
End: 2025-06-13

## 2025-06-13 DIAGNOSIS — M17.11 PRIMARY OSTEOARTHRITIS OF RIGHT KNEE: ICD-10-CM

## 2025-06-13 DIAGNOSIS — M17.12 PRIMARY OSTEOARTHRITIS OF LEFT KNEE: Primary | ICD-10-CM

## 2025-06-13 RX ORDER — METHYLPREDNISOLONE ACETATE 40 MG/ML
40 INJECTION, SUSPENSION INTRA-ARTICULAR; INTRALESIONAL; INTRAMUSCULAR; SOFT TISSUE ONCE
Status: COMPLETED | OUTPATIENT
Start: 2025-06-13 | End: 2025-06-13

## 2025-06-13 RX ADMIN — METHYLPREDNISOLONE ACETATE 40 MG: 40 INJECTION, SUSPENSION INTRA-ARTICULAR; INTRALESIONAL; INTRAMUSCULAR; SOFT TISSUE at 08:37

## 2025-06-13 NOTE — PROGRESS NOTES
surgery.  In order to help with his symptoms today we did proceed with a cortisone injection the left knee in addition to his Euflexxa injection.  Please  see procedure notes below.    Impression: LEFT Knee DJD    The patient's name and date of birth were confirmed prior to the injection.  The injection site was also confirmed with the patient prior to the procedure.    Procedure: Depomedrol(US) injection with US guidance    Radiology Report: Ultrasound guidance was used to examine the joint, ensure adequate needle placement and to decrease the risk of joint aggravation.  The intercondylar notch, retropatellar fat pad, patella tendon, patella and tibia were all visualized.  Pre and post injection ultrasound still images were obtained and placed in the record. Image were obtained with a Flocktory ultrasound transducer (model [id number]).    Procedure Note: The LEFT knee was prepped with alcohol. Then, under ultrasound guidance, the knee was injected with 1 mL of 0.5% Marcaine and 40mg of Depo-Medrol. The patient tolerated the procedure without difficulty.      CC: BILATERAL Knee Osteoarthritis      PROCEDURE: #2 of 3 Hyaluronic Injection    The patient's name and date of birth were confirmed prior to the injection.  The injection site was also confirmed with the patient prior to the procedure. After discussion of risks and benefits including but not limited to pain, infection, skin discoloration, and injury to blood vessels or nerves the patient verbally consented to proceed with injection of the BILATERAL.  The patient is to restrict their activity for 48 hours.    Radiology Report: US guidance was used to examine the joint, ensure adequate needle placement and to decrease the risk of joint aggravation. The intracondylar notch, retropatellar fat pad, patella tendon, patella and tibia were all visualized. Pre and post injection US still images were obtained and placed in the record. Image were obtained with a Flocktory

## 2025-06-20 ENCOUNTER — OFFICE VISIT (OUTPATIENT)
Dept: ORTHOPEDIC SURGERY | Age: 65
End: 2025-06-20

## 2025-06-20 DIAGNOSIS — M17.12 PRIMARY OSTEOARTHRITIS OF LEFT KNEE: Primary | ICD-10-CM

## 2025-06-20 DIAGNOSIS — M17.11 PRIMARY OSTEOARTHRITIS OF RIGHT KNEE: ICD-10-CM

## 2025-06-20 DIAGNOSIS — M54.16 LUMBAR RADICULOPATHY: Primary | ICD-10-CM

## 2025-06-20 NOTE — PROGRESS NOTES
Name: Errol Smalls  YOB: 1960  Gender: male  MRN: [id number]     CC: BILATERAL Knee Osteoarthritis      PROCEDURE: #3 of 3 Hyaluronic Injection    The patient's name and date of birth were confirmed prior to the injection.  The injection site was also confirmed with the patient prior to the procedure. After discussion of risks and benefits including but not limited to pain, infection, skin discoloration, and injury to blood vessels or nerves the patient verbally consented to proceed with injection of the BILATERAL.  The patient is to restrict their activity for 48 hours.    Radiology Report: US guidance was used to examine the joint, ensure adequate needle placement and to decrease the risk of joint aggravation. The intracondylar notch, retropatellar fat pad, patella tendon, patella and tibia were all visualized. Pre and post injection US still images were obtained and placed in the record. Image were obtained with a Kiwigrid ultrasound transducer (model [id number]).    Procedure Note: After steriley prepping the BILATERAL knee, it was injected with a 2mL of Euflexxa and the medication was observed going into the intra-articular space via US guidance.    The patient tolerated the procedure without difficulty.    ALKA Hylton

## 2025-06-26 ENCOUNTER — OFFICE VISIT (OUTPATIENT)
Dept: ORTHOPEDIC SURGERY | Age: 65
End: 2025-06-26
Payer: COMMERCIAL

## 2025-06-26 DIAGNOSIS — M54.16 LUMBAR RADICULOPATHY: ICD-10-CM

## 2025-06-26 DIAGNOSIS — M48.061 SPINAL STENOSIS OF LUMBAR REGION WITHOUT NEUROGENIC CLAUDICATION: ICD-10-CM

## 2025-06-26 DIAGNOSIS — M54.50 LUMBAR BACK PAIN: Primary | ICD-10-CM

## 2025-06-26 PROCEDURE — 99214 OFFICE O/P EST MOD 30 MIN: CPT | Performed by: PHYSICAL MEDICINE & REHABILITATION

## 2025-06-26 PROCEDURE — 1123F ACP DISCUSS/DSCN MKR DOCD: CPT | Performed by: PHYSICAL MEDICINE & REHABILITATION

## 2025-06-26 NOTE — PROGRESS NOTES
Date:  06/26/25     HPI:  I am seeing Errol Smalls in evalution/folowup.  He has had problems for greater than 5 years.  Currently complains of pain in the lower lumbar paraspinal areas, primarily dull.  Questionable exacerbating features.  Does do better with sitting, lying down, reclining or leaning to the left.  The pain may gravitate down the posterior aspect of either leg to around the knee.  He does deny neurologic issues in lower extremities.  His pain scale me approaches 8/10.  This caused trouble sleeping, work related duties, house related duties, bathing, cleaning, dressing.  He has had physical therapy in the past without dramatic response.  Had taken naproxen in the past, but was told to avoid it.  Tylenol has a slight benefit.  He has tried gabapentin.  Oral steroids only had temporary benefit.  In the past he had spine injections that have had some benefit, noting that in October of last year he had a translaminar lumbar OPAL and then more recently bilateral L4 selective nerve root blocks.  Those injections typically have about 80% pain reduction for 3 months.  The pain has come back in a similar fashion.    He has had MR imaging that reveals disc disease, significant neuroforaminal narrowing at the middle levels.  He was evaluated by Dr. Cheatham and is felt that spine surgery could be more of a last resort.    He also has seen Dr. Castillo as of late and she is ordered repeat translaminar lumbar OPAL.    Of note he has had prior lumbar spine surgical intervention at the right L4-L5 level about 30 years or so ago.    Past Medical History:   Diagnosis Date    Anemia     pt denies    GERD (gastroesophageal reflux disease)     controlled with med    History of kidney stones 2018    X1 with surgical intervention    History of shingles 2016    has residual outbreaks R eye    Hypertension     controlled w/ med    Kidney stone onset 2018    Leg pain     right leg pain - sciatic leg pain per patient    [facility] 
left lower/periumbilical

## 2025-07-09 ENCOUNTER — OFFICE VISIT (OUTPATIENT)
Dept: ORTHOPEDIC SURGERY | Age: 65
End: 2025-07-09
Payer: COMMERCIAL

## 2025-07-09 DIAGNOSIS — M54.16 LUMBAR RADICULOPATHY: Primary | ICD-10-CM

## 2025-07-09 PROCEDURE — 62323 NJX INTERLAMINAR LMBR/SAC: CPT | Performed by: PHYSICAL MEDICINE & REHABILITATION

## 2025-07-09 RX ORDER — TRIAMCINOLONE ACETONIDE 40 MG/ML
40 INJECTION, SUSPENSION INTRA-ARTICULAR; INTRAMUSCULAR ONCE
Status: COMPLETED | OUTPATIENT
Start: 2025-07-09 | End: 2025-07-09

## 2025-07-09 RX ADMIN — TRIAMCINOLONE ACETONIDE 40 MG: 40 INJECTION, SUSPENSION INTRA-ARTICULAR; INTRAMUSCULAR at 08:26

## 2025-07-09 NOTE — PROGRESS NOTES
Date: 07/09/25   Name: Errol Smalls    Pre-Procedural Diagnosis:    Diagnosis Orders   1. Lumbar radiculopathy  XR INJ SPINE THER SUBST LUM/SAC W IMG    triamcinolone acetonide (KENALOG-40) injection 40 mg          Procedure: Lumbar Epidural Steroid Injection (OPAL)    I have reviewed prior lumbar spine radiographs that reveal 5 non rib-bearing lumbar vertebrae. and I have personally reviewed with patient the informed consent for operation/procedure per [facility] protocol.  This involves risks and benefits of procedure, potential for success/improvement of injections,qualifications of physician performing procedure.  Consent form addressed appropriate local anesthesia.  This form was signed by all appropriate parties and scanned into the medical record. Note that is not appropriate for me to discuss spine surgical issues or other treatment options if I am not the primary clinician.  If I am the ordering clinician, those issues would have been discussed at the appropriate office visit or at upcoming encounter.     Precautions: [facility] Precautions spine injections: None.  Patient denies any prior sensitivity to steroid, local anesthetic, contrast dye, iodine or shellfish.    The procedure was discussed at length with the patient and informed consent was signed. The patient was placed in a prone position on the fluoroscopy table and the skin was prepped and draped in a routine sterile fashion. The area to be injected was anesthetized with approximately 5 cc of 1% Lidocaine. Initially a 22-gauge 3.5 inch spinal needle was carefully advanced under fluoroscopic guidance to the left L5-S1 interlaminar epidural space. At this time 0.25 cc of omnipaque administered.. Once proper placement was confirmed, 1.5 cc of sterile water and 100 mg of Kenalog were injected through the spinal needle.     Fluoroscopic guidance was used intermittently over a 10-minute period to insure proper needle placement and patient safety. A

## 2025-08-18 DIAGNOSIS — M54.16 LUMBAR RADICULOPATHY: Primary | ICD-10-CM

## 2025-08-18 RX ORDER — HYDROCODONE BITARTRATE AND ACETAMINOPHEN 10; 325 MG/1; MG/1
1 TABLET ORAL 2 TIMES DAILY PRN
Qty: 60 TABLET | Refills: 0 | Status: CANCELLED | OUTPATIENT
Start: 2025-08-18 | End: 2025-09-17

## 2025-09-02 ENCOUNTER — OFFICE VISIT (OUTPATIENT)
Dept: ORTHOPEDIC SURGERY | Age: 65
End: 2025-09-02
Payer: COMMERCIAL

## 2025-09-02 DIAGNOSIS — M79.671 RIGHT FOOT PAIN: Primary | ICD-10-CM

## 2025-09-02 DIAGNOSIS — M19.072 PRIMARY OSTEOARTHRITIS OF BOTH FEET: ICD-10-CM

## 2025-09-02 DIAGNOSIS — M76.822 POSTERIOR TIBIAL TENDINITIS OF LEFT LOWER EXTREMITY: ICD-10-CM

## 2025-09-02 DIAGNOSIS — M79.672 LEFT FOOT PAIN: ICD-10-CM

## 2025-09-02 DIAGNOSIS — M19.071 PRIMARY OSTEOARTHRITIS OF BOTH FEET: ICD-10-CM

## 2025-09-02 PROCEDURE — 1123F ACP DISCUSS/DSCN MKR DOCD: CPT | Performed by: ORTHOPAEDIC SURGERY

## 2025-09-02 PROCEDURE — 99213 OFFICE O/P EST LOW 20 MIN: CPT | Performed by: ORTHOPAEDIC SURGERY

## 2025-09-02 RX ORDER — LIDOCAINE 5% 5 G/100G
CREAM TOPICAL
Qty: 45 G | Refills: 2 | Status: SHIPPED | OUTPATIENT
Start: 2025-09-02

## 2025-09-05 ENCOUNTER — OFFICE VISIT (OUTPATIENT)
Dept: ORTHOPEDIC SURGERY | Age: 65
End: 2025-09-05

## 2025-09-05 VITALS — BODY MASS INDEX: 28.72 KG/M2 | WEIGHT: 183 LBS | HEIGHT: 67 IN

## 2025-09-05 DIAGNOSIS — M17.11 PRIMARY OSTEOARTHRITIS OF RIGHT KNEE: ICD-10-CM

## 2025-09-05 DIAGNOSIS — M17.12 PRIMARY OSTEOARTHRITIS OF LEFT KNEE: Primary | ICD-10-CM

## 2025-09-05 RX ORDER — DICLOFENAC SODIUM 75 MG/1
75 TABLET, DELAYED RELEASE ORAL 2 TIMES DAILY
Qty: 60 TABLET | Refills: 0 | Status: SHIPPED | OUTPATIENT
Start: 2025-09-05

## (undated) DEVICE — ABSORBENT, WATERPROOF, BACTERIA PROOF FILM DRESSING: Brand: OPSITE POST OP 9.5X8.5CM CTN 20

## (undated) DEVICE — CONTAINER,SPECIMEN,O.R.STRL,4.5OZ: Brand: MEDLINE

## (undated) DEVICE — CYSTO/BLADDER IRRIGATION SET, REGULATING CLAMP

## (undated) DEVICE — PACKING 8004000 NEURAY 200PK 13X13MM: Brand: NEURAY ®

## (undated) DEVICE — GUIDEWIRE ENDOSCP L150CM OD035IN PTFE STR MOVABLE COR

## (undated) DEVICE — REM POLYHESIVE ADULT PATIENT RETURN ELECTRODE: Brand: VALLEYLAB

## (undated) DEVICE — CARBIDE MATCH HEAD

## (undated) DEVICE — DRAPE SHT 3 QTR PROXIMA 53X77 --

## (undated) DEVICE — PACKING 8004007 NEURAY 200PK 13X76MM: Brand: NEURAY ®

## (undated) DEVICE — GUIDEWIRE UROLOGICAL STR 3 CM 0.038 INX150 CM DUAL-FLEX

## (undated) DEVICE — SOLUTION IRRIG 3000ML H2O STRL BAG

## (undated) DEVICE — GARMENT,MEDLINE,DVT,INT,CALF,MED, GEN2: Brand: MEDLINE

## (undated) DEVICE — CODMAN VERSATRU™ STANDARD DISPOSABLE NON-STICK BIPOLAR FORCEPS 9" (23CM), 1.5MM TIP: Brand: CODMAN VERSATRU™

## (undated) DEVICE — THE MILL DISPOSABLE - FINE

## (undated) DEVICE — INSULATED BLADE ELECTRODE: Brand: EDGE

## (undated) DEVICE — STANDARD HYPODERMIC NEEDLE,POLYPROPYLENE HUB: Brand: MONOJECT

## (undated) DEVICE — Device

## (undated) DEVICE — BANDAGE COMPR L5YDXW2IN FOAM CO FLX

## (undated) DEVICE — GLOVE ORANGE PI 8 1/2   MSG9085

## (undated) DEVICE — DRAPE XR C ARM 41X74IN LF --

## (undated) DEVICE — DRESSING,GAUZE,XEROFORM,CURAD,1"X8",ST: Brand: CURAD

## (undated) DEVICE — DRAPE IRRIG FLD WRM W44XL44IN W/ AORN STD PRTBL INTRATEMP

## (undated) DEVICE — FLOSEAL HEMOSTATIC MATRIX, 10 ML: Brand: FLOSEAL

## (undated) DEVICE — SUTURE VCRL SZ 4-0 L27IN ABSRB UD L19MM PS-2 3/8 CIR PRIM J426H

## (undated) DEVICE — SUTURE STRATAFIX SPRL MCRYL + SZ 4-0 L12IN ABSRB UD PS-2 SXMP1B117

## (undated) DEVICE — SYRINGE EAR 2OZ ULC SLIMMER TIP FLAT BTM SUCT PWR DISP FOR

## (undated) DEVICE — KENDALL SCD EXPRESS SLEEVES, KNEE LENGTH, MEDIUM: Brand: KENDALL SCD

## (undated) DEVICE — KIT EVAC 0.13IN RECT TB DIA10FR 400CC PVC 3 SPR Y CONN DRN

## (undated) DEVICE — TRAY CATH 16F DRN BG LTX -- CONVERT TO ITEM 363158

## (undated) DEVICE — SUTURE VCRL VIO BR 0 18IN C/R M04 J701D

## (undated) DEVICE — BANDAGE,GAUZE,CONFORMING,2"X75",STRL,LF: Brand: MEDLINE INDUSTRIES, INC.

## (undated) DEVICE — DRAPE TWL SURG 16X26IN BLU ORB04] ALLCARE INC]

## (undated) DEVICE — DISPOSABLE BIPOLAR CODE, 12' (3.66 M): Brand: CONMED

## (undated) DEVICE — PACK SURGICAL PROCEDURE KIT CYSTOSCOPY TOTE

## (undated) DEVICE — TUBING, SUCTION, 1/4" X 10', STRAIGHT: Brand: MEDLINE

## (undated) DEVICE — CATHETER URET 5FR L70CM TIP 8FR OPN END CONE TIP INJ HUB

## (undated) DEVICE — SURGIFOAM SPNG SZ 100

## (undated) DEVICE — TRAY PREP DRY W/ PREM GLV 2 APPL 6 SPNG 2 UNDPD 1 OVERWRAP

## (undated) DEVICE — DRAPE, FILM SHEET, 44X65 STERILE: Brand: MEDLINE

## (undated) DEVICE — PADDING CAST W3INXL4YD COT BLEND MIC PLEAT UNDERCAST SPEC

## (undated) DEVICE — SPINE NEURO: Brand: MEDLINE INDUSTRIES, INC.

## (undated) DEVICE — SUTURE VCRL SZ 2-0 L18IN ABSRB UD L26MM CP-2 1/2 CIR REV J762D

## (undated) DEVICE — SOLUTION IRRIG 1000ML 09% SOD CHL USP PIC PLAS CONTAINER

## (undated) DEVICE — (D)PREP SKN CHLRAPRP APPL 26ML -- CONVERT TO ITEM 371833

## (undated) DEVICE — SOLUTION IV 1000ML 0.9% SOD CHL

## (undated) DEVICE — DRILL: Brand: OASYS

## (undated) DEVICE — AGENT HEMSTAT 8ML FLX TIP MTRX + DISP SURGIFLO

## (undated) DEVICE — MAYFIELD® DISPOSABLE ADULT SKULL PIN (PLASTIC BASE): Brand: MAYFIELD®

## (undated) DEVICE — INTENDED FOR TISSUE SEPARATION, AND OTHER PROCEDURES THAT REQUIRE A SHARP SURGICAL BLADE TO PUNCTURE OR CUT.: Brand: BARD-PARKER SAFETY BLADES SIZE 10, STERILE

## (undated) DEVICE — SINGLE-USE DIGITAL FLEXIBLE URETEROSCOPE: Brand: LITHOVUE

## (undated) DEVICE — PACKING 8004002 NEURAY 200PK 13X25MM: Brand: NEURAY ®

## (undated) DEVICE — CYSTO: Brand: MEDLINE INDUSTRIES, INC.

## (undated) DEVICE — GLOVE ORANGE PI 7 1/2   MSG9075

## (undated) DEVICE — FLEXIVA PULSE AND FLEXIVA PULSE TRACTIP LASER FIBERS ARE HIGH POWER SINGLE-USE: Brand: FLEXIVA PULSE